# Patient Record
Sex: FEMALE | Race: WHITE | NOT HISPANIC OR LATINO | Employment: OTHER | URBAN - METROPOLITAN AREA
[De-identification: names, ages, dates, MRNs, and addresses within clinical notes are randomized per-mention and may not be internally consistent; named-entity substitution may affect disease eponyms.]

---

## 2017-03-21 ENCOUNTER — MYC MEDICAL ADVICE (OUTPATIENT)
Dept: FAMILY MEDICINE | Facility: CLINIC | Age: 82
End: 2017-03-21

## 2017-03-21 DIAGNOSIS — M70.61 TROCHANTERIC BURSITIS OF RIGHT HIP: ICD-10-CM

## 2017-03-21 DIAGNOSIS — M54.5 LOW BACK PAIN, UNSPECIFIED BACK PAIN LATERALITY, UNSPECIFIED CHRONICITY, WITH SCIATICA PRESENCE UNSPECIFIED: Primary | ICD-10-CM

## 2017-03-21 NOTE — TELEPHONE ENCOUNTER
Per 12/16/16 ov:  Bursitis of right hip  She will switch to pool exercises when she returns to [place] in January. If persists when she comes back, could consider injection for sports meds    physical therapy referral pending

## 2017-04-28 DIAGNOSIS — I10 HYPERTENSION GOAL BP (BLOOD PRESSURE) < 140/90: ICD-10-CM

## 2017-04-28 DIAGNOSIS — M17.12 PRIMARY OSTEOARTHRITIS OF LEFT KNEE: ICD-10-CM

## 2017-04-28 RX ORDER — AMLODIPINE BESYLATE 5 MG/1
TABLET ORAL
Qty: 90 TABLET | Refills: 0 | Status: SHIPPED | OUTPATIENT
Start: 2017-04-28 | End: 2017-06-02

## 2017-04-28 RX ORDER — LOSARTAN POTASSIUM 50 MG/1
TABLET ORAL
Qty: 90 TABLET | Refills: 0 | Status: SHIPPED | OUTPATIENT
Start: 2017-04-28 | End: 2017-06-02

## 2017-05-01 ENCOUNTER — TELEPHONE (OUTPATIENT)
Dept: FAMILY MEDICINE | Facility: CLINIC | Age: 82
End: 2017-05-01

## 2017-05-01 NOTE — TELEPHONE ENCOUNTER
Chart reflects meds sent to her pharmacy 4/28/17 x 90 days.  Will be due for MD appointment in June.  Busy signal x2.  Martha Mccray RN

## 2017-05-01 NOTE — TELEPHONE ENCOUNTER
Patient is calling to request refills on Amlodipine and Losartan medications. Patient says she comes to see  every 6 months for a recheck and labs, but she saw her last December. Please call patient to advise. Thank you

## 2017-05-01 NOTE — TELEPHONE ENCOUNTER
Prescriptions were filled 4/28/17 to [facility] mail order.   Left message on answering machine for patient/parent to call back.   [phone].  Ramona Villarreal RN

## 2017-05-02 NOTE — TELEPHONE ENCOUNTER
Patient is informed prescriptions were sent to [facility] 4/28/17.  Aware due for appointment in June.   Will call back if has concerns.  Verbalized good understanding.   Ramona Villarreal RN

## 2017-05-08 ENCOUNTER — THERAPY VISIT (OUTPATIENT)
Dept: PHYSICAL THERAPY | Facility: CLINIC | Age: 82
End: 2017-05-08
Payer: MEDICARE

## 2017-05-08 DIAGNOSIS — M25.551 HIP PAIN, RIGHT: ICD-10-CM

## 2017-05-08 DIAGNOSIS — M54.50 RIGHT-SIDED LOW BACK PAIN WITHOUT SCIATICA: Primary | ICD-10-CM

## 2017-05-08 PROCEDURE — 97110 THERAPEUTIC EXERCISES: CPT | Mod: GP | Performed by: PHYSICAL THERAPIST

## 2017-05-08 PROCEDURE — G8978 MOBILITY CURRENT STATUS: HCPCS | Mod: GP | Performed by: PHYSICAL THERAPIST

## 2017-05-08 PROCEDURE — G8979 MOBILITY GOAL STATUS: HCPCS | Mod: GP | Performed by: PHYSICAL THERAPIST

## 2017-05-08 PROCEDURE — 97161 PT EVAL LOW COMPLEX 20 MIN: CPT | Mod: GP | Performed by: PHYSICAL THERAPIST

## 2017-05-08 ASSESSMENT — ACTIVITIES OF DAILY LIVING (ADL)
WALKING_APPROXIMATELY_10_MINUTES: NO DIFFICULTY AT ALL
GETTING_INTO_AND_OUT_OF_AN_AVERAGE_CAR: NO DIFFICULTY AT ALL
HOS_ADL_ITEM_SCORE_TOTAL: 65
HOS_ADL_COUNT: 17
WALKING_DOWN_STEEP_HILLS: NO DIFFICULTY AT ALL
STANDING_FOR_15_MINUTES: NO DIFFICULTY AT ALL
WALKING_15_MINUTES_OR_GREATER: NO DIFFICULTY AT ALL
STEPPING_UP_AND_DOWN_CURBS: NO DIFFICULTY AT ALL
RECREATIONAL_ACTIVITIES: NO DIFFICULTY AT ALL
GOING_DOWN_1_FLIGHT_OF_STAIRS: NO DIFFICULTY AT ALL
DEEP_SQUATTING: EXTREME DIFFICULTY
WALKING_INITIALLY: NO DIFFICULTY AT ALL
GETTING_INTO_AND_OUT_OF_A_BATHTUB: NO DIFFICULTY AT ALL
SITTING_FOR_15_MINUTES: NO DIFFICULTY AT ALL
HEAVY_WORK: NO DIFFICULTY AT ALL
ROLLING_OVER_IN_BED: NO DIFFICULTY AT ALL
HOS_ADL_HIGHEST_POTENTIAL_SCORE: 68
GOING_UP_1_FLIGHT_OF_STAIRS: NO DIFFICULTY AT ALL
HOS_ADL_SCORE(%): 95.59
LIGHT_TO_MODERATE_WORK: NO DIFFICULTY AT ALL
TWISTING/PIVOTING_ON_INVOLVED_LEG: NO DIFFICULTY AT ALL
WALKING_UP_STEEP_HILLS: NO DIFFICULTY AT ALL
PUTTING_ON_SOCKS_AND_SHOES: NO DIFFICULTY AT ALL

## 2017-05-08 NOTE — PROGRESS NOTES
[facility] Initial Evaluation -- Lumbar    Date: May 8, 2017  Kate Isaac is a 81 year old female with a lumbar condition.   Referral: primary care  Work mechanical stresses:  retired  Employment status:  retired  Leisure mechanical stresses: walking for exercise  Functional disability score (CRISTÓBAL/STarT Back):  See CRISTÓBAL in flowsheet  VAS score (0-10): 2/10  Patient goals:  Decrease pain    HISTORY:    Present symptoms: R buttock/lateral hip pain, B low back pain  Pain quality (sharp/shooting/stabbing/aching/burning/cramping):  achey   Paresthesia (yes/no):  no    Present since (onset date): fall 2016 (saw MD then).  More recently in March 2017 after getting back from [place] for winter messaged MD and was recommended PT   Symptoms (improving/unchanging/worsening):  unchanging.   Symptoms commenced as a result of: nothing   Condition occurred in the following environment:   home     Symptoms at onset (back/thigh/leg): R buttock/lateral R hip  Constant symptoms (back/thigh/leg): R buttock/lateral R hip  Intermittent symptoms (back/thigh/leg): low back    Symptoms are made worse with the following: Always Walking, Always Lying on R side and Time of day - No effect   Symptoms are made better with the following: nothing    Disturbed sleep (yes/no):  Yes,  Interrupts sleep but able to get back to sleep Sleeping postures (prone/sup/side R/L): either side    Previous episodes (0/1-5/6-10/11+): none prior to last fall Year of first episode: n/a    Previous history: MD dx R hip bursitis  Previous treatments: none      Specific Questions:  Cough/Sneeze/Strain (pos/neg): no  Bowel/Bladder (normal/abnormal): no  Gait (normal/abnormal): normal  Medications (nil/NSAIDS/analg/steroids/anticoag/other):  Other - Hormone replacement and Malexacam for OA in knees  Medical allergies:  sulfa  General health (excellent/good/fair/poor):  good  Pertinent medical history:  Osteoarthritis, Cancer (breast 1996, basal cell  2004) and High blood pressure  Imaging (NA/Xray/MRI):  none  Recent or major surgery (yes/no):  no  Night pain (yes/no): no  Accidents (yes/no): no  Unexplained weight loss (yes/no): no  Barriers at home: no  Other red flags: none    EXAMINATION    Posture:   Sitting (good/fair/poor): fair  Standing (good/fair/poor):fair  Lordosis (red/acc/normal): red  Correction of posture (better/worse/no effect): NE    Lateral Shift (right/left/nil): nil  Relevant (yes/no):  n/a  Other Observations: none    Neurological:    Motor deficit:  B hip flexion and knee extension 4/5  Reflexes:  n/a  Sensory deficit:  none  Dural signs:  n/a    Movement Loss:   Santiago Mod Min Nil Pain   Flexion    x    Extension   x  Inc low back   Side Gliding R    x    Side Gliding L    x      Test Movements:   During: produces, abolishes, increases, decreases, no effect, centralizing, peripheralizing   After: better, worse, no better, no worse, no effect, centralized, peripheralized    Pretest symptoms standing: B LBP, R buttock pain   Symptoms During Symptoms After ROM increased ROM decreased No Effect   FIS No Effect No Effect      Rep FIS        EIS Inc low back No Effect      Rep EIS Centralising No Better   x   Pretest symptoms lying: R buttock pain    Symptoms During Symptoms After ROM increased ROM decreased No Effect   DIMA        Rep DIMA        EIL        Rep EIL        If required, pretest symptoms:    Symptoms During Symptoms After ROM increased ROM decreased No Effect   SGIS - R        Rep SGIS - R        SGIS - L        Rep SGIS - L          Static Tests:  Sitting slouched:  Inc Low back  Sitting erect:  NE  Standing slouched n/a  Standing erect:  n/a  Lying prone in extension:  Abolishes R buttcok Long sitting:  n/a    Other Tests: tender upon palpation B greater trochanters, + B STEVEN, NE B passive hip flexion    Provisional Classification:  Inconclusive/Other -     Principle of Management:  Education:  Use of lumbar support with  "roll   Equipment provided:  none  Mechanical therapy (Y/N):  Y   Extension principle:  EIS 10 reps every 2-3 hrs, prone lying in extension up to 2x/day  Lateral Principle:    Flexion principle:    Other:      ASSESSMENT/PLAN:    Patient is a 81 year old female with lumbar complaints.    Patient has the following significant findings with corresponding treatment plan.                Diagnosis 1:  B LBP with R hip pain (\"other\")  Pain -  manual therapy, self management, education, directional preference exercise and home program  Decreased ROM/flexibility - manual therapy, therapeutic exercise, therapeutic activity and home program  Decreased strength - therapeutic exercise, therapeutic activities and home program  Inflammation - self management/home program  Decreased function - therapeutic activities and home program  Impaired posture - neuro re-education, therapeutic activities and home program    Therapy Evaluation Codes:   1) History comprised of:   Personal factors that impact the plan of care:      None.    Comorbidity factors that impact the plan of care are:      High blood pressure and Osteoarthritis.     Medications impacting care: Pain.  2) Examination of Body Systems comprised of:   Body structures and functions that impact the plan of care:      Hip and Lumbar spine.   Activity limitations that impact the plan of care are:      Walking.  3) Clinical presentation characteristics are:   Stable/Uncomplicated.  4) Decision-Making    Low complexity using standardized patient assessment instrument and/or measureable assessment of functional outcome.  Cumulative Therapy Evaluation is: Low complexity.    Previous and current functional limitations:  (See Goal Flow Sheet for this information)    Short term and Long term goals: (See Goal Flow Sheet for this information)     Communication ability:  Patient appears to be able to clearly communicate and understand verbal and written communication and follow " directions correctly.  Treatment Explanation - The following has been discussed with the patient:   RX ordered/plan of care  Anticipated outcomes  Possible risks and side effects  This patient would benefit from PT intervention to resume normal activities.   Rehab potential is good.    Frequency:  2 X week, once daily  Duration:  for 3 weeks  Discharge Plan:  Achieve all LTG.  Independent in home treatment program.  Reach maximal therapeutic benefit.    Please refer to the daily flowsheet for treatment today, total treatment time and time spent performing 1:1 timed codes.

## 2017-05-08 NOTE — PROGRESS NOTES
HPI                    Objective:    System    Physical Exam                                         Musculoskeletal:        Arms:       Legs:      ROS

## 2017-05-08 NOTE — LETTER
DEPARTMENT OF HEALTH AND HUMAN SERVICES  CENTERS FOR MEDICARE & MEDICAID SERVICES    PLAN/UPDATED PLAN OF PROGRESS FOR OUTPATIENT REHABILITATION    PATIENTS NAME:  Kate Isaac     : 1935    PROVIDER NUMBER:    [id number]    [facility]    PROVIDER NAME: [facility]    MEDICAL RECORD NUMBER: [id number]     START OF CARE DATE:  SOC Date: 17   TYPE:  PT    PRIMARY/TREATMENT DIAGNOSIS: (Pertinent Medical Diagnosis)     Right-sided low back pain without sciatica  Hip pain, right    VISITS FROM START OF CARE:  Rxs Used: 1     [facility] Initial Evaluation -- Lumbar    Date: May 8, 2017  Kate Isaac is a 81 year old female with a lumbar condition.   Referral: primary care  Work mechanical stresses:  retired  Employment status:  retired  Leisure mechanical stresses: walking for exercise  Functional disability score (CRISTÓBAL/STarT Back):  See CRISTÓBAL in flowsheet  VAS score (0-10): 2/10  Patient goals:  Decrease pain    HISTORY:    Present symptoms: R buttock/lateral hip pain, B low back pain  Pain quality (sharp/shooting/stabbing/aching/burning/cramping):  achey   Paresthesia (yes/no):  no    Present since (onset date): 2016 (saw MD then).  More recently in 2017 after getting back from [place] for winter terry MARVIN and was recommended PT   Symptoms (improving/unchanging/worsening):  unchanging.   Symptoms commenced as a result of: nothing   Condition occurred in the following environment:   home     Symptoms at onset (back/thigh/leg): R buttock/lateral R hip  Constant symptoms (back/thigh/leg): R buttock/lateral R hip  Intermittent symptoms (back/thigh/leg): low back    Symptoms are made worse with the following: Always Walking, Always Lying on R side and Time of day - No effect   Symptoms are made better with the following: nothing    Disturbed sleep (yes/no):  Yes,  Interrupts sleep but able to get back to sleep Sleeping  postures (prone/sup/side R/L): either side    Previous episodes (0/1-5/6-10/11+): none prior to last fall Year of first episode: n/a    Previous history: MD kei AWAD hip bursitis  Previous treatments: none      Specific Questions:  Cough/Sneeze/Strain (pos/neg): no  Bowel/Bladder (normal/abnormal): no  Gait (normal/abnormal): normal  Medications (nil/NSAIDS/analg/steroids/anticoag/other):  Other - Hormone replacement and Malexacam for OA in knees  Medical allergies:  sulfa  General health (excellent/good/fair/poor):  good  Pertinent medical history:  Osteoarthritis, Cancer (breast 1996, basal cell 2004) and High blood pressure  Imaging (NA/Xray/MRI):  none  Recent or major surgery (yes/no):  no  Night pain (yes/no): no  Accidents (yes/no): no  Unexplained weight loss (yes/no): no  Barriers at home: no  Other red flags: none    EXAMINATION    Posture:   Sitting (good/fair/poor): fair  Standing (good/fair/poor):fair  Lordosis (red/acc/normal): red  Correction of posture (better/worse/no effect): NE    Lateral Shift (right/left/nil): nil  Relevant (yes/no):  n/a  Other Observations: none    Neurological:    Motor deficit:  B hip flexion and knee extension 4/5  Reflexes:  n/a  Sensory deficit:  none  Dural signs:  n/a    Movement Loss:   Santiago Mod Min Nil Pain   Flexion    x    Extension   x  Inc low back   Side Gliding R    x    Side Gliding L    x      Test Movements:   During: produces, abolishes, increases, decreases, no effect, centralizing, peripheralizing   After: better, worse, no better, no worse, no effect, centralized, peripheralized    Pretest symptoms standing: B LBP, R buttock pain   Symptoms During Symptoms After ROM increased ROM decreased No Effect   FIS No Effect No Effect      Rep FIS        EIS Inc low back No Effect      Rep EIS Centralising No Better   x   Pretest symptoms lying: R buttock pain    Symptoms During Symptoms After ROM increased ROM decreased No Effect   DIMA        Rep DIMA        EIL       "  Rep EIL        If required, pretest symptoms:    Symptoms During Symptoms After ROM increased ROM decreased No Effect   SGIS - R        Rep SGIS - R        SGIS - L        Rep SGIS - L          Static Tests:  Sitting slouched:  Inc Low back  Sitting erect:  NE  Standing slouched n/a  Standing erect:  n/a  Lying prone in extension:  Abolishes R buttcok Long sitting:  n/a    Other Tests: tender upon palpation B greater trochanters, + B STEVEN, NE B passive hip flexion    Provisional Classification:  Inconclusive/Other -     Principle of Management:  Education:  Use of lumbar support with roll   Equipment provided:  none  Mechanical therapy (Y/N):  Y   Extension principle:  EIS 10 reps every 2-3 hrs, prone lying in extension up to 2x/day  Lateral Principle:    Flexion principle:    Other:      ASSESSMENT/PLAN:    Patient is a 81 year old female with lumbar complaints.    Patient has the following significant findings with corresponding treatment plan.                Diagnosis 1:  B LBP with R hip pain (\"other\")  Pain -  manual therapy, self management, education, directional preference exercise and home program  Decreased ROM/flexibility - manual therapy, therapeutic exercise, therapeutic activity and home program  Decreased strength - therapeutic exercise, therapeutic activities and home program  Inflammation - self management/home program  Decreased function - therapeutic activities and home program  Impaired posture - neuro re-education, therapeutic activities and home program    Therapy Evaluation Codes:   1) History comprised of:   Personal factors that impact the plan of care:      None.    Comorbidity factors that impact the plan of care are:      High blood pressure and Osteoarthritis.     Medications impacting care: Pain.  2) Examination of Body Systems comprised of:   Body structures and functions that impact the plan of care:      Hip and Lumbar spine.   Activity limitations that impact the plan of care " "are:      Walking.  3) Clinical presentation characteristics are:   Stable/Uncomplicated.  4) Decision-Making    Low complexity using standardized patient assessment instrument and/or measureable assessment of functional outcome.  Cumulative Therapy Evaluation is: Low complexity.    Previous and current functional limitations:  (See Goal Flow Sheet for this information)    Short term and Long term goals: (See Goal Flow Sheet for this information)     Communication ability:  Patient appears to be able to clearly communicate and understand verbal and written communication and follow directions correctly.  Treatment Explanation - The following has been discussed with the patient:   RX ordered/plan of care  Anticipated outcomes  Possible risks and side effects  This patient would benefit from PT intervention to resume normal activities.   Rehab potential is good.    Frequency:  2 X week, once daily  Duration:  for 3 weeks  Discharge Plan:  Achieve all LTG.  Independent in home treatment program.  Reach maximal therapeutic benefit.    Please refer to the daily flowsheet for treatment today, total treatment time and time spent performing 1:1 timed codes.             HPI                    Objective:    System    Physical Exam                                         Musculoskeletal:        Arms:       Legs:      ROS                  Caregiver Signature/Credentials _____________________________ Date ________       Treating Provider: Arian Wynne DPT   I have reviewed and certified the need for these services and plan of treatment while under my care.        PHYSICIAN'S SIGNATURE:   _________________________________________  Date___________   Lani Waters    Certification period:  Beginning of Cert date period: 05/08/17 to  End of Cert period date: 08/05/17     Functional Level Progress Report: Please see attached \"Goal Flow sheet for Functional level.\"    ____X____ Continue Services or       ________ DC Services         "        Service dates: From  SOC Date: 05/08/17 date to present

## 2017-05-08 NOTE — MR AVS SNAPSHOT
After Visit Summary   5/8/2017    Kate Isaac    MRN: [id number]           Patient Information     Date Of Birth          1935        Visit Information        Provider Department      5/8/2017 10:10 AM Arian Wynne, PT [facility]        Today's Diagnoses     Right-sided low back pain without sciatica    -  1    Hip pain, right           Follow-ups after your visit        Your next 10 appointments already scheduled     May 12, 2017 10:20 AM CDT   KEVIN Spine with Arian Wynne PT   [facility] ([facility])    [address]  [facility] [id number] [phone]            May 16, 2017 12:30 PM CDT   KEVIN Spine with Arian Wynne PT   [facility] ([facility])    [address]  [facility] [id number] [phone]              Who to contact     If you have questions or need follow up information about today's clinic visit or your schedule please contact [facility] directly at [phone].  Normal or non-critical lab and imaging results will be communicated to you by MyChart, letter or phone within 4 business days after the clinic has received the results. If you do not hear from us within 7 days, please contact the clinic through [employer] or phone. If you have a critical or abnormal lab result, we will notify you by phone as soon as possible.  Submit refill requests through [employer] or call your pharmacy and they will forward the refill request to us. Please allow 3 business days for your refill to be completed.          Additional Information About Your Visit        [employer] Information     [employer] gives you secure access to your electronic health record. If you see a primary care provider, you can also send messages to your care team and  make appointments. If you have questions, please call your primary care clinic.  If you do not have a primary care provider, please call [phone] and they will assist you.        Care EveryWhere ID     This is your Care EveryWhere ID. This could be used by other organizations to access your [facility] medical records  [id number]         Blood Pressure from Last 3 Encounters:   12/20/16 119/60   12/16/16 158/72   11/01/16 118/66    Weight from Last 3 Encounters:   12/16/16 65.3 kg (144 lb)   10/10/16 64.9 kg (143 lb)   10/07/16 64.3 kg (141 lb 12.8 oz)              We Performed the Following     HC PT EVAL, LOW COMPLEXITY     KEVIN CERT REPORT     KEVIN INITIAL EVAL REPORT     THERAPEUTIC EXERCISES        Primary Care Provider Office Phone # Fax #    Lani Waters [phone]       [facility] [id number] [place] [id number]        Thank you!     Thank you for choosing [facility]  for your care. Our goal is always to provide you with excellent care. Hearing back from our patients is one way we can continue to improve our services. Please take a few minutes to complete the written survey that you may receive in the mail after your visit with us. Thank you!             Your Updated Medication List - Protect others around you: Learn how to safely use, store and throw away your medicines at www.disposemymeds.org.          This list is accurate as of: 5/8/17 11:25 AM.  Always use your most recent med list.                   Brand Name Dispense Instructions for use    amLODIPine 5 MG tablet    NORVASC    90 tablet    TAKE 1 TABLET EVERY DAY       aspirin 81 MG tablet      1 TABLET DAILY       calcium carb 1250 mg (500 mg [place])/vitamin D 200 units 500-200 MG-UNIT per tablet    OSCAL with D     Take 1 tablet by mouth 2 times daily (with meals)       FIBER PO      Take by mouth At Bedtime       FLAX PO          GLUCOSAMINE CHONDROITIN  Tabs      1 twice daily       losartan 50 MG tablet    COZAAR    90 tablet    TAKE 1 TABLET EVERY DAY       meloxicam 15 MG tablet    MOBIC    90 tablet    Take 1 tablet (15 mg) by mouth daily       * PRESERVISION AREDS 2 Caps          * CENTRUM SILVER ADULT 50+ Tabs      Take 1 capsule by mouth       * Notice:  This list has 2 medication(s) that are the same as other medications prescribed for you. Read the directions carefully, and ask your doctor or other care provider to review them with you.

## 2017-05-12 ENCOUNTER — THERAPY VISIT (OUTPATIENT)
Dept: PHYSICAL THERAPY | Facility: CLINIC | Age: 82
End: 2017-05-12
Payer: MEDICARE

## 2017-05-12 DIAGNOSIS — M54.50 RIGHT-SIDED LOW BACK PAIN WITHOUT SCIATICA, UNSPECIFIED CHRONICITY: ICD-10-CM

## 2017-05-12 DIAGNOSIS — M25.551 HIP PAIN, RIGHT: ICD-10-CM

## 2017-05-12 PROCEDURE — 97530 THERAPEUTIC ACTIVITIES: CPT | Mod: GP | Performed by: PHYSICAL THERAPIST

## 2017-05-12 PROCEDURE — 97110 THERAPEUTIC EXERCISES: CPT | Mod: GP | Performed by: PHYSICAL THERAPIST

## 2017-05-12 NOTE — MR AVS SNAPSHOT
After Visit Summary   5/12/2017    Kate Isaac    MRN: [id number]           Patient Information     Date Of Birth          1935        Visit Information        Provider Department      5/12/2017 10:20 AM Arian Wynne, PT [facility] Physical Therapy        Today's Diagnoses     Right-sided low back pain without sciatica, unspecified chronicity        Hip pain, right           Follow-ups after your visit        Your next 10 appointments already scheduled     May 19, 2017 11:40 AM CDT   [facility] Spine with Arian Wynne PT   [facility] Physical Therapy ([facility])    [address]  [facility] [address] [phone]              Who to contact     If you have questions or need follow up information about today's clinic visit or your schedule please contact [facility] PHYSICAL [facility] directly at [phone].  Normal or non-critical lab and imaging results will be communicated to you by Camperoohart, letter or phone within 4 business days after the clinic has received the results. If you do not hear from us within 7 days, please contact the clinic through [employer] or phone. If you have a critical or abnormal lab result, we will notify you by phone as soon as possible.  Submit refill requests through [employer] or call your pharmacy and they will forward the refill request to us. Please allow 3 business days for your refill to be completed.          Additional Information About Your Visit        Camperoohart Information     [employer] gives you secure access to your electronic health record. If you see a primary care provider, you can also send messages to your care team and make appointments. If you have questions, please call your primary care clinic.  If you do not have a primary care provider, please call [phone] and they will assist you.        Care EveryWhere ID     This  is your Care EveryWhere ID. This could be used by other organizations to access your [facility] medical records  [id number]         Blood Pressure from Last 3 Encounters:   12/20/16 119/60   12/16/16 158/72   11/01/16 118/66    Weight from Last 3 Encounters:   12/16/16 65.3 kg (144 lb)   10/10/16 64.9 kg (143 lb)   10/07/16 64.3 kg (141 lb 12.8 oz)              We Performed the Following     THERAPEUTIC ACTIVITIES     THERAPEUTIC EXERCISES        Primary Care Provider Office Phone # Fax #    Lani Waters [phone]       [facility] [address] [facility] [address]        Thank you!     Thank you for choosing [place] FOR ATHLETIC MEDICINE [facility] PHYSICAL THERAPY  for your care. Our goal is always to provide you with excellent care. Hearing back from our patients is one way we can continue to improve our services. Please take a few minutes to complete the written survey that you may receive in the mail after your visit with us. Thank you!             Your Updated Medication List - Protect others around you: Learn how to safely use, store and throw away your medicines at www.disposemymeds.org.          This list is accurate as of: 5/12/17 11:02 AM.  Always use your most recent med list.                   Brand Name Dispense Instructions for use    amLODIPine 5 MG tablet    NORVASC    90 tablet    TAKE 1 TABLET EVERY DAY       aspirin 81 MG tablet      1 TABLET DAILY       calcium carb 1250 mg (500 mg [place])/vitamin D 200 units 500-200 MG-UNIT per tablet    OSCAL with D     Take 1 tablet by mouth 2 times daily (with meals)       FIBER PO      Take by mouth At Bedtime       FLAX PO          GLUCOSAMINE CHONDROITIN Tabs      1 twice daily       losartan 50 MG tablet    COZAAR    90 tablet    TAKE 1 TABLET EVERY DAY       meloxicam 15 MG tablet    MOBIC    90 tablet    Take 1 tablet (15 mg) by mouth daily       * PRESERVISION AREDS 2 Caps          * CENTRUM SILVER ADULT 50+  Tabs      Take 1 capsule by mouth       * Notice:  This list has 2 medication(s) that are the same as other medications prescribed for you. Read the directions carefully, and ask your doctor or other care provider to review them with you.

## 2017-05-23 ENCOUNTER — DOCUMENTATION ONLY (OUTPATIENT)
Dept: LAB | Facility: CLINIC | Age: 82
End: 2017-05-23

## 2017-05-23 DIAGNOSIS — I10 ESSENTIAL HYPERTENSION WITH GOAL BLOOD PRESSURE LESS THAN 140/90: Primary | ICD-10-CM

## 2017-05-23 NOTE — PROGRESS NOTES
Please review and order laboratory future orders for patient  upcoming lab appointment on 05/30/17.  Thank you,   Astrid Fountain MLT

## 2017-05-30 ENCOUNTER — MYC MEDICAL ADVICE (OUTPATIENT)
Dept: FAMILY MEDICINE | Facility: CLINIC | Age: 82
End: 2017-05-30

## 2017-05-30 DIAGNOSIS — I10 ESSENTIAL HYPERTENSION WITH GOAL BLOOD PRESSURE LESS THAN 140/90: ICD-10-CM

## 2017-05-30 LAB
ANION GAP SERPL CALCULATED.3IONS-SCNC: 7 MMOL/L (ref 3–14)
BUN SERPL-MCNC: 11 MG/DL (ref 7–30)
CALCIUM SERPL-MCNC: 9.1 MG/DL (ref 8.5–10.1)
CHLORIDE SERPL-SCNC: 103 MMOL/L (ref 94–109)
CHOLEST SERPL-MCNC: 202 MG/DL
CO2 SERPL-SCNC: 29 MMOL/L (ref 20–32)
CREAT SERPL-MCNC: 0.57 MG/DL (ref 0.52–1.04)
GFR SERPL CREATININE-BSD FRML MDRD: NORMAL ML/MIN/1.7M2
GLUCOSE SERPL-MCNC: 97 MG/DL (ref 70–99)
HDLC SERPL-MCNC: 91 MG/DL
LDLC SERPL CALC-MCNC: 98 MG/DL
NONHDLC SERPL-MCNC: 111 MG/DL
POTASSIUM SERPL-SCNC: 4.3 MMOL/L (ref 3.4–5.3)
SODIUM SERPL-SCNC: 139 MMOL/L (ref 133–144)
TRIGL SERPL-MCNC: 63 MG/DL

## 2017-05-30 PROCEDURE — 80048 BASIC METABOLIC PNL TOTAL CA: CPT | Performed by: FAMILY MEDICINE

## 2017-05-30 PROCEDURE — 80061 LIPID PANEL: CPT | Performed by: FAMILY MEDICINE

## 2017-05-30 PROCEDURE — 36415 COLL VENOUS BLD VENIPUNCTURE: CPT | Performed by: FAMILY MEDICINE

## 2017-05-31 RX ORDER — ACETAMINOPHEN 500 MG
1000 TABLET ORAL 2 TIMES DAILY PRN
COMMUNITY
Start: 2017-05-31 | End: 2017-10-11

## 2017-05-31 NOTE — TELEPHONE ENCOUNTER
Discussed verbal orders with Dr. Lani Waters, to provider to cosign.  Removed meloxicam from med list, added tylenol TERRENCEN  Steph Waters

## 2017-06-05 ENCOUNTER — OFFICE VISIT (OUTPATIENT)
Dept: FAMILY MEDICINE | Facility: CLINIC | Age: 82
End: 2017-06-05
Payer: COMMERCIAL

## 2017-06-05 VITALS
SYSTOLIC BLOOD PRESSURE: 136 MMHG | TEMPERATURE: 97.7 F | DIASTOLIC BLOOD PRESSURE: 70 MMHG | WEIGHT: 143 LBS | OXYGEN SATURATION: 99 % | BODY MASS INDEX: 24.93 KG/M2 | HEART RATE: 86 BPM

## 2017-06-05 DIAGNOSIS — I10 HYPERTENSION GOAL BP (BLOOD PRESSURE) < 140/90: Primary | ICD-10-CM

## 2017-06-05 DIAGNOSIS — M17.12 PRIMARY OSTEOARTHRITIS OF LEFT KNEE: ICD-10-CM

## 2017-06-05 DIAGNOSIS — M25.511 CHRONIC RIGHT SHOULDER PAIN: ICD-10-CM

## 2017-06-05 DIAGNOSIS — H90.6 MIXED HEARING LOSS, BILATERAL: ICD-10-CM

## 2017-06-05 DIAGNOSIS — G89.29 CHRONIC RIGHT SHOULDER PAIN: ICD-10-CM

## 2017-06-05 DIAGNOSIS — C50.919 MALIGNANT NEOPLASM OF FEMALE BREAST, UNSPECIFIED LATERALITY, UNSPECIFIED SITE OF BREAST: ICD-10-CM

## 2017-06-05 PROCEDURE — 99214 OFFICE O/P EST MOD 30 MIN: CPT | Performed by: FAMILY MEDICINE

## 2017-06-05 RX ORDER — LOSARTAN POTASSIUM 50 MG/1
TABLET ORAL
Qty: 90 TABLET | Refills: 1 | Status: SHIPPED | OUTPATIENT
Start: 2017-06-05 | End: 2017-10-11

## 2017-06-05 RX ORDER — AMLODIPINE BESYLATE 5 MG/1
TABLET ORAL
Qty: 90 TABLET | Refills: 3 | Status: SHIPPED | OUTPATIENT
Start: 2017-06-05 | End: 2018-04-30

## 2017-06-05 NOTE — NURSING NOTE
"Chief Complaint   Patient presents with     Swelling     right arm        Initial /73  Pulse 86  Temp 97.7  F (36.5  C) (Oral)  Wt 143 lb (64.9 kg)  SpO2 99%  BMI 24.93 kg/m2 Estimated body mass index is 24.93 kg/(m^2) as calculated from the following:    Height as of 12/16/16: 5' 3.5\" (1.613 m).    Weight as of this encounter: 143 lb (64.9 kg).  Medication Reconciliation: complete  "

## 2017-06-05 NOTE — PROGRESS NOTES
SUBJECTIVE:                                                    Kate Isaac is a 81 year old female who presents to clinic today for the following health issues:      Swelling in right arm started April 1-2   Always there since 04/01/17  No pain ,just worry some due to no lymph nodes removed when she had breast cancer surgery  Will refer to PT for lymphedema    Soreness in right shoulder   Right shoulder pain for 6 months. Did not improve after stopping golfing  No trauma or falls  Pain is an ache of the upper arm. Worse with use, better with rest  Worse with abduction     Pt with HTN. On meds, well controlled, needs refills.    Pt with hearing screen from outside source. Note some sensorineural loss but also conduction loss. Will refer to ENT for further assessment    Pt with some pain in left knee. Discussed previous xray showed arthritis. Tylenol ES as needed    Pt w          Problem list and histories reviewed & adjusted, as indicated.  Additional history: as documented    Labs reviewed in EPIC    Reviewed and updated as needed this visit by clinical staff  Tobacco  Allergies  Med Hx  Surg Hx  Fam Hx  Soc Hx      Reviewed and updated as needed this visit by Provider         ROS:  Constitutional, HEENT, cardiovascular, pulmonary, gi and gu systems are negative, except as otherwise noted.    OBJECTIVE:                                                    /70  Pulse 86  Temp 97.7  F (36.5  C) (Oral)  Wt 143 lb (64.9 kg)  SpO2 99%  BMI 24.93 kg/m2  Body mass index is 24.93 kg/(m^2).  GENERAL: healthy, alert and no distress  NECK: no adenopathy, no asymmetry, masses, or scars and thyroid normal to palpation  RESP: lungs clear to auscultation - no rales, rhonchi or wheezes  CV: regular rate and rhythm, normal S1 S2, no S3 or S4, no murmur, click or rub, no peripheral edema and peripheral pulses strong  ABDOMEN: soft, nontender, no hepatosplenomegaly, no masses and bowel sounds normal  MS: no  gross musculoskeletal defects noted, edema noted in right extremity. Pain to palpation over upper right arm. Limited ROM in right shoulder especially with abduction    Diagnostic Test Results:  none      ASSESSMENT/PLAN:                                                            1. Hypertension goal BP (blood pressure) < 140/90  At goal on meds, fu in 6 months  - losartan (COZAAR) 50 MG tablet; TAKE 1 TABLET EVERY DAY  Dispense: 90 tablet; Refill: 1  - amLODIPine (NORVASC) 5 MG tablet; TAKE 1 TABLET EVERY DAY  Dispense: 90 tablet; Refill: 3    2. Primary osteoarthritis of left knee      3. Mixed hearing loss, bilateral  As above,  - OTOLARYNGOLOGY REFERRAL    4. Chronic right shoulder pain  To PT  - [facility] PT, HAND, AND CHIROPRACTIC REFERRAL    5. Malignant neoplasm of female breast, unspecified laterality, unspecified site of breast (H)  Cause for right arm edema, will refer to PT for management of edema          Lani Licea MD  [facility]

## 2017-06-05 NOTE — MR AVS SNAPSHOT
After Visit Summary   6/5/2017    Kate Isaac    MRN: [id number]           Patient Information     Date Of Birth          1935        Visit Information        Provider Department      6/5/2017 11:40 AM Lani Waters MD [facility]        Today's Diagnoses     Hypertension goal BP (blood pressure) < 140/90    -  1    Primary osteoarthritis of left knee        Mixed hearing loss, bilateral        Chronic right shoulder pain        Malignant neoplasm of female breast, unspecified laterality, unspecified site of breast (H)           Follow-ups after your visit        Additional Services     [facility] PT, HAND, AND CHIROPRACTIC REFERRAL       **This order will print in the [facility] Scheduling Office**    Physical Therapy, Hand Therapy and Chiropractic Care are available through:    *[facility]  *[facility]  *[facility]    Call one number to schedule at any of the above locations: [phone].    Your provider has referred you to: Physical Therapy at [facility] or [facility]    Indication/Reason for Referral: Shoulder Pain  Onset of Illness: 6 months  Therapy Orders: Evaluate and Treat  Special Programs: None  Special Request: None    Celia Yang      Additional Comments for the Therapist or Chiropractor:     Please be aware that coverage of these services is subject to the terms and limitations of your health insurance plan.  Call member services at your health plan with any benefit or coverage questions.      Please bring the following to your appointment:    *Your personal calendar for scheduling future appointments  *Comfortable clothing            OTOLARYNGOLOGY REFERRAL       Your provider has referred you to: [facility]: [facility] [phone]   [email or web address]    Please be aware that coverage of these services is subject to the terms and limitations of your health insurance plan.  Call member  services at your health plan with any benefit or coverage questions.      Please bring the following with you to your appointment:    (1) Any X-Rays, CTs or MRIs which have been performed.  Contact the facility where they were done to arrange for  prior to your scheduled appointment.   (2) List of current medications  (3) This referral request   (4) Any documents/labs given to you for this referral                  Who to contact     If you have questions or need follow up information about today's clinic visit or your schedule please contact [facility] directly at [phone].  Normal or non-critical lab and imaging results will be communicated to you by [employer], letter or phone within 4 business days after the clinic has received the results. If you do not hear from us within 7 days, please contact the clinic through [employer] or phone. If you have a critical or abnormal lab result, we will notify you by phone as soon as possible.  Submit refill requests through [employer] or call your pharmacy and they will forward the refill request to us. Please allow 3 business days for your refill to be completed.          Additional Information About Your Visit        MyChart Information     [employer] gives you secure access to your electronic health record. If you see a primary care provider, you can also send messages to your care team and make appointments. If you have questions, please call your primary care clinic.  If you do not have a primary care provider, please call [phone] and they will assist you.        Care EveryWhere ID     This is your Care EveryWhere ID. This could be used by other organizations to access your [facility] medical records  [id number]        Your Vitals Were     Pulse Temperature Pulse Oximetry BMI (Body Mass Index)          86 97.7  F (36.5  C) (Oral) 99% 24.93 kg/m2         Blood Pressure from Last 3 Encounters:   06/05/17 136/70   12/20/16 119/60   12/16/16 158/72     Weight from Last 3 Encounters:   06/05/17 143 lb (64.9 kg)   12/16/16 144 lb (65.3 kg)   10/10/16 143 lb (64.9 kg)              We Performed the Following     [facility] PT, HAND, AND CHIROPRACTIC REFERRAL     OTOLARYNGOLOGY REFERRAL          Today's Medication Changes          These changes are accurate as of: 6/5/17 11:59 PM.  If you have any questions, ask your nurse or doctor.               These medicines have changed or have updated prescriptions.        Dose/Directions    amLODIPine 5 MG tablet   Commonly known as:  NORVASC   This may have changed:  See the new instructions.   Used for:  Hypertension goal BP (blood pressure) < 140/90   Changed by:  Lani Waters MD        TAKE 1 TABLET EVERY DAY   Quantity:  90 tablet   Refills:  3       losartan 50 MG tablet   Commonly known as:  COZAAR   This may have changed:  See the new instructions.   Used for:  Hypertension goal BP (blood pressure) < 140/90   Changed by:  Lani Waters MD        TAKE 1 TABLET EVERY DAY   Quantity:  90 tablet   Refills:  1            Where to get your medicines      Some of these will need a paper prescription and others can be bought over the counter.  Ask your nurse if you have questions.     Bring a paper prescription for each of these medications     amLODIPine 5 MG tablet    losartan 50 MG tablet                Primary Care Provider Office Phone # Fax #    Lani Waters [phone]       [facility] [address] [facility] [address]        Thank you!     Thank you for choosing [facility]  for your care. Our goal is always to provide you with excellent care. Hearing back from our patients is one way we can continue to improve our services. Please take a few minutes to complete the written survey that you may receive in the mail after your visit with us. Thank you!             Your Updated Medication List - Protect others around you: Learn how to safely use, store and throw away your  medicines at www.disposemymeds.org.          This list is accurate as of: 6/5/17 11:59 PM.  Always use your most recent med list.                   Brand Name Dispense Instructions for use    amLODIPine 5 MG tablet    NORVASC    90 tablet    TAKE 1 TABLET EVERY DAY       aspirin 81 MG tablet      1 TABLET DAILY       calcium carb 1250 mg (500 mg [place])/vitamin D 200 units 500-200 MG-UNIT per tablet    OSCAL with D     Take 1 tablet by mouth 2 times daily (with meals)       FIBER PO      Take by mouth At Bedtime       FLAX PO          GLUCOSAMINE CHONDROITIN Tabs      1 twice daily       losartan 50 MG tablet    COZAAR    90 tablet    TAKE 1 TABLET EVERY DAY       * PRESERVISION AREDS 2 Caps          * CENTRUM SILVER ADULT 50+ Tabs      Take 1 capsule by mouth       TYLENOL 500 MG tablet   Generic drug:  acetaminophen      Take 2 tablets (1,000 mg) by mouth 2 times daily as needed for mild pain       * Notice:  This list has 2 medication(s) that are the same as other medications prescribed for you. Read the directions carefully, and ask your doctor or other care provider to review them with you.

## 2017-06-06 ENCOUNTER — MYC MEDICAL ADVICE (OUTPATIENT)
Dept: FAMILY MEDICINE | Facility: CLINIC | Age: 82
End: 2017-06-06

## 2017-06-06 DIAGNOSIS — I89.0 LYMPHEDEMA OF EXTREMITY: Primary | ICD-10-CM

## 2017-06-06 NOTE — TELEPHONE ENCOUNTER
Message ment to be sent to Dr. Licea.  Please review the Work[employer] message below and advise patient.    Naomi NANCE RN, BSN

## 2017-06-06 NOTE — TELEPHONE ENCOUNTER
Can you please find out where there is a physical therapist that can help pt with her right arm lymphedema from her breast cancer.    Lani Licea

## 2017-06-07 ENCOUNTER — MEDICAL CORRESPONDENCE (OUTPATIENT)
Dept: HEALTH INFORMATION MANAGEMENT | Facility: CLINIC | Age: 82
End: 2017-06-07

## 2017-06-07 ENCOUNTER — MYC MEDICAL ADVICE (OUTPATIENT)
Dept: FAMILY MEDICINE | Facility: CLINIC | Age: 82
End: 2017-06-07

## 2017-06-07 NOTE — TELEPHONE ENCOUNTER
There is a telephone message open in patient chart for   To look in to a lymphedema clinic. Awaiting   Response.  Steph Smith, ANNN RN

## 2017-06-08 NOTE — TELEPHONE ENCOUNTER
[facility] has a Lymphedema Therapy clinic that can see the patient.  Patient first needs a therapy referral placed.  The representative that I spoke with stated we should use the following referral:  Lymphedema Therapy (not clinic) Referral that states P.T. or O.T.  Then the patient can call [phone] to schedule an evaluation and PT appointments.  Sonia Stephens,

## 2017-06-09 NOTE — TELEPHONE ENCOUNTER
Patient is not home, so I gave the scheduling number to the patients  and he will relay the information to the patient.  He believes that the Therapy center may have left the patient a phone message as well.  Sonia Stephens,

## 2017-06-14 ENCOUNTER — HOSPITAL ENCOUNTER (OUTPATIENT)
Dept: OCCUPATIONAL THERAPY | Facility: CLINIC | Age: 82
Setting detail: THERAPIES SERIES
End: 2017-06-14
Attending: FAMILY MEDICINE
Payer: MEDICARE

## 2017-06-14 PROCEDURE — 97140 MANUAL THERAPY 1/> REGIONS: CPT | Mod: GO | Performed by: OCCUPATIONAL THERAPIST

## 2017-06-14 PROCEDURE — G8987 SELF CARE CURRENT STATUS: HCPCS | Mod: GO,CJ | Performed by: OCCUPATIONAL THERAPIST

## 2017-06-14 PROCEDURE — G8988 SELF CARE GOAL STATUS: HCPCS | Mod: GO,CI | Performed by: OCCUPATIONAL THERAPIST

## 2017-06-14 PROCEDURE — 97165 OT EVAL LOW COMPLEX 30 MIN: CPT | Mod: GO | Performed by: OCCUPATIONAL THERAPIST

## 2017-06-14 PROCEDURE — 97535 SELF CARE MNGMENT TRAINING: CPT | Mod: GO | Performed by: OCCUPATIONAL THERAPIST

## 2017-06-14 PROCEDURE — 40000445 ZZHC STATISTIC OT VISIT, LYMPHEDEMA: Performed by: OCCUPATIONAL THERAPIST

## 2017-06-14 NOTE — PROGRESS NOTES
06/14/17 0918   Quick Adds   Quick Adds Certification   Type of Visit   Type of visit Initial Edema Evaluation   General Information   Start of care 06/14/17   Referring physician Jayne Mendieta CNP   Orders Evaluate and treat as indicated   Order date 06/06/17   Medical diagnosis H/o right breast cancer; lumpectomy, LND and radiation in 1996.  Presents with RUE lymphedema   Edema onset 04/01/17   Affected body parts RUE   Edema etiology Cancer with lymph node dissection;Radiation;Surgery   Location - Cancer with lymph node dissection right axillary lymphnode dissection   Location - Radiation RUQ   Surgical / medical history reviewed Yes   Prior level of functional mobility IND   Prior treatment (none)   Community support Family / friend caregiver   Patient role / employment history Retired   Living environment [place] [facility]   Fall Screening   Fall screen completed by OT   Per patient, fall 2 or more times in past year? No   Is patient a fall risk? No   System Outcome Measures   Outcome Measures Lymphedema   Lymphedema Life Impact Scale (score range 0-72). A higher score indicates greater impairment. 6   Subjective Report   Patient report of symptoms swelling in right lower arm   Patient / Family Goals   Patient / family goals statement to reduce swelling in RUE and prevent progression of lymphedema   Pain   Patient currently in pain Yes;Yes, see vital signs flowsheet   Pain location right shoulder, posterior   Pain description Ache   Cognitive Status   Orientation Orientation to person, place and time   Level of consciousness Alert   Follows commands and answers questions 100% of the time   Edema Exam / Assessment   Skin condition comments NO edema of LUE.  RUE with skin intact, no edema of digits and hand.  Moderate, dense, soft edema of medial arm.  Right, upper arm with soft, minimal edema of medial border.     Scar No   Stemmer sign Positive   Stemmer sign comments medial border of right arm from wrist to  axilla   Ulceration No   Girth Measurements   Girth Measurements Refer to separate girth measurement flowsheet   Range of Motion   ROM comments BUE AROM WNL for all joints   Posture   Posture Normal   Activities of Daily Living   Activities of Daily Living IND   Bed Mobility   Bed mobility IND   Transfers   Transfers IND   Gait / Locomotion   Gait / Locomotion IND   Sensory   Sensory perception comments no deficits, intact of right digits   Planned Edema Interventions   Planned edema interventions Manual lymph drainage;Gradient compression bandaging;Fit for compression garment;Exercises;Precautions to prevent infection / exacerbation;Education;Manual therapy;ADL training;Skin care / precautions;Home management program development   Clinical Impression   Criteria for skilled therapeutic intervention met Yes   Therapy diagnosis RUE lymphedema   Clinical Decision Making (Complexity) Low complexity   Treatment frequency (2x/wk x3wks, 1x/wk x1wk=7 more tx sessions)   Treatment duration within 6 weeks;7/26/17    Patient / family and/or staff in agreement with plan of care Yes   Risks and benefits of therapy have been explained Yes   Clinical impression comments Pt presents with lymphedema of RUE.  Pt will benefit from continued skilled OT intervention to preserve skin integrity, prevent infection, prevent progression of lymphedema and to reduce edema to be fit for a compression garment.     Goals   Edema Eval Goals 1;2;3   Goal 1   Goal identifier home program   Goal description Pt will demonstrate IND with home program including: GCB to RUE , self MLD and HEP to reduce edema to improve skin integrity, prevent infection and to be fit for compression sleeve/hand piece for edema management at discharge.   Target date 07/26/17   Goal 2   Goal identifier volume   Goal description Pt will demonstrate a 200mL reduction in RUE volume to prevent progression of lymphedema and to be fit for compression garments for edema management  at [facility].    Target date 07/26/17   Goal 3   Goal identifier discharge   Goal description Pt will be IND with donning compression sleeve and hand piece and verbalizing wear/wash/replace schedule for edema management at discharge.    Target date 07/26/17   Total Evaluation Time   Total evaluation time 20   Certification   Certification date from 06/14/17   Certification date to 07/26/17   Medical Diagnosis RUE lymphedema

## 2017-06-14 NOTE — PROGRESS NOTES
[facility]        OUTPATIENT OCCUPATIONAL THERAPY EDEMA EVALUATION  PLAN OF TREATMENT FOR OUTPATIENT REHABILITATION  (COMPLETE FOR INITIAL CLAIMS ONLY)  Patient's Last Name, First Name, Kate Randhawa                           Provider s Name:   [facility] Medical Record No.  [id number]     Start of Care Date:  06/14/17   Onset Date:  04/01/17   Type:      Medical Diagnosis:  RUE lymphedema   Therapy Diagnosis:  RUE lymphedema Visits from SOC:  1                                     __________________________________________________________________________________   Plan of Treatment/Functional Goals:    Manual lymph drainage, Gradient compression bandaging, Fit for compression garment, Exercises, Precautions to prevent infection / exacerbation, Education, Manual therapy, ADL training, Skin care / precautions, Home management program development        GOALS  1. Goal description: Pt will demonstrate IND with home program including: GCB to RUE , self MLD and HEP to reduce edema to improve skin integrity, prevent infection and to be fit for compression sleeve/hand piece for edema management at discharge.       Target date: 07/26/17  2. Goal description: Pt will demonstrate a 200mL reduction in RUE volume to prevent progression of lymphedema and to be fit for compression garments for edema management at [facility].        Target date: 07/26/17  3. Goal description: Pt will be IND with donning compression sleeve and hand piece and verbalizing wear/wash/replace schedule for edema management at discharge.        Target date: 07/26/17  4.            5.            6.               7.             8.              Treatment frequency:  (2x/wk x3wks, 1x/wk x1wk=7 more tx sessions)   Treatment duration: within 6 weeks;7/26/17     Kenna Anaya OT                                     I CERTIFY THE NEED FOR THESE SERVICES FURNISHED UNDER        THIS PLAN OF TREATMENT AND WHILE UNDER MY CARE     (Physician co-signature of this document indicates review and certification of the therapy plan).                   Certification date from: 06/14/17       Certification date to: 07/26/17           Referring physician: Jayne Mendieta CNP   Initial Assessment  See Epic Evaluation- Start of care: 06/14/17               Lani Licea

## 2017-06-19 ENCOUNTER — HOSPITAL ENCOUNTER (OUTPATIENT)
Dept: OCCUPATIONAL THERAPY | Facility: CLINIC | Age: 82
Setting detail: THERAPIES SERIES
End: 2017-06-19
Attending: FAMILY MEDICINE
Payer: MEDICARE

## 2017-06-19 PROCEDURE — 97140 MANUAL THERAPY 1/> REGIONS: CPT | Mod: GO | Performed by: OCCUPATIONAL THERAPIST

## 2017-06-19 PROCEDURE — 40000445 ZZHC STATISTIC OT VISIT, LYMPHEDEMA: Performed by: OCCUPATIONAL THERAPIST

## 2017-06-21 ENCOUNTER — HOSPITAL ENCOUNTER (OUTPATIENT)
Dept: OCCUPATIONAL THERAPY | Facility: CLINIC | Age: 82
Setting detail: THERAPIES SERIES
End: 2017-06-21
Attending: FAMILY MEDICINE
Payer: MEDICARE

## 2017-06-21 PROCEDURE — 40000445 ZZHC STATISTIC OT VISIT, LYMPHEDEMA: Performed by: OCCUPATIONAL THERAPIST

## 2017-06-21 PROCEDURE — 97535 SELF CARE MNGMENT TRAINING: CPT | Mod: GO | Performed by: OCCUPATIONAL THERAPIST

## 2017-06-21 PROCEDURE — 97140 MANUAL THERAPY 1/> REGIONS: CPT | Mod: GO | Performed by: OCCUPATIONAL THERAPIST

## 2017-07-03 ENCOUNTER — MYC MEDICAL ADVICE (OUTPATIENT)
Dept: FAMILY MEDICINE | Facility: CLINIC | Age: 82
End: 2017-07-03

## 2017-07-03 DIAGNOSIS — H90.3 BILATERAL SENSORINEURAL HEARING LOSS: Primary | ICD-10-CM

## 2017-07-05 ENCOUNTER — HOSPITAL ENCOUNTER (OUTPATIENT)
Dept: OCCUPATIONAL THERAPY | Facility: CLINIC | Age: 82
Setting detail: THERAPIES SERIES
End: 2017-07-05
Attending: FAMILY MEDICINE
Payer: MEDICARE

## 2017-07-05 PROCEDURE — 97140 MANUAL THERAPY 1/> REGIONS: CPT | Mod: GO | Performed by: OCCUPATIONAL THERAPIST

## 2017-07-05 PROCEDURE — 40000445 ZZHC STATISTIC OT VISIT, LYMPHEDEMA: Performed by: OCCUPATIONAL THERAPIST

## 2017-07-05 NOTE — TELEPHONE ENCOUNTER
Per 6/5/17 ov:  Pt with hearing screen from outside source. Note some sensorineural loss but also conduction loss. Will refer to ENT for further assessment    There are two different referrals for ENT, both referrals are pending. To provider to advise.  Steph Smith, ANNN RN

## 2017-07-07 ENCOUNTER — MYC MEDICAL ADVICE (OUTPATIENT)
Dept: FAMILY MEDICINE | Facility: CLINIC | Age: 82
End: 2017-07-07

## 2017-07-07 ENCOUNTER — HOSPITAL ENCOUNTER (OUTPATIENT)
Dept: OCCUPATIONAL THERAPY | Facility: CLINIC | Age: 82
Setting detail: THERAPIES SERIES
End: 2017-07-07
Attending: FAMILY MEDICINE
Payer: MEDICARE

## 2017-07-07 PROCEDURE — 40000445 ZZHC STATISTIC OT VISIT, LYMPHEDEMA: Performed by: OCCUPATIONAL THERAPIST

## 2017-07-07 PROCEDURE — 97140 MANUAL THERAPY 1/> REGIONS: CPT | Mod: GO | Performed by: OCCUPATIONAL THERAPIST

## 2017-07-10 ENCOUNTER — MYC MEDICAL ADVICE (OUTPATIENT)
Dept: FAMILY MEDICINE | Facility: CLINIC | Age: 82
End: 2017-07-10

## 2017-07-10 DIAGNOSIS — H90.3 BILATERAL SENSORINEURAL HEARING LOSS: Primary | ICD-10-CM

## 2017-07-10 NOTE — TELEPHONE ENCOUNTER
can you print off and fax over the referral to ENT that was written today? Once you do, please let patient know via mychart so she can make an appointment.  Thank you, ANN PeacockN RN

## 2017-07-10 NOTE — TELEPHONE ENCOUNTER
Referral faxed to [facility] @ [phone].  Notified patient through [employer].  Sonia Stephens,

## 2017-07-12 ENCOUNTER — HOSPITAL ENCOUNTER (OUTPATIENT)
Dept: OCCUPATIONAL THERAPY | Facility: CLINIC | Age: 82
Setting detail: THERAPIES SERIES
End: 2017-07-12
Attending: FAMILY MEDICINE
Payer: MEDICARE

## 2017-07-12 PROCEDURE — 97140 MANUAL THERAPY 1/> REGIONS: CPT | Mod: GO | Performed by: OCCUPATIONAL THERAPIST

## 2017-07-12 PROCEDURE — 40000445 ZZHC STATISTIC OT VISIT, LYMPHEDEMA: Performed by: OCCUPATIONAL THERAPIST

## 2017-07-19 ENCOUNTER — TRANSFERRED RECORDS (OUTPATIENT)
Dept: HEALTH INFORMATION MANAGEMENT | Facility: CLINIC | Age: 82
End: 2017-07-19

## 2017-07-28 ENCOUNTER — OFFICE VISIT (OUTPATIENT)
Dept: FAMILY MEDICINE | Facility: CLINIC | Age: 82
End: 2017-07-28
Payer: COMMERCIAL

## 2017-07-28 VITALS
OXYGEN SATURATION: 99 % | DIASTOLIC BLOOD PRESSURE: 81 MMHG | HEART RATE: 90 BPM | TEMPERATURE: 97.2 F | SYSTOLIC BLOOD PRESSURE: 147 MMHG

## 2017-07-28 DIAGNOSIS — R35.0 URINARY FREQUENCY: ICD-10-CM

## 2017-07-28 DIAGNOSIS — N39.0 URINARY TRACT INFECTION WITH HEMATURIA, SITE UNSPECIFIED: Primary | ICD-10-CM

## 2017-07-28 DIAGNOSIS — R82.90 NONSPECIFIC FINDING ON EXAMINATION OF URINE: ICD-10-CM

## 2017-07-28 DIAGNOSIS — R31.9 URINARY TRACT INFECTION WITH HEMATURIA, SITE UNSPECIFIED: Primary | ICD-10-CM

## 2017-07-28 LAB
ALBUMIN UR-MCNC: NEGATIVE MG/DL
AMORPH CRY #/AREA URNS HPF: ABNORMAL /HPF
APPEARANCE UR: ABNORMAL
BACTERIA #/AREA URNS HPF: ABNORMAL /HPF
BILIRUB UR QL STRIP: NEGATIVE
COLOR UR AUTO: YELLOW
GLUCOSE UR STRIP-MCNC: NEGATIVE MG/DL
HGB UR QL STRIP: ABNORMAL
KETONES UR STRIP-MCNC: NEGATIVE MG/DL
LEUKOCYTE ESTERASE UR QL STRIP: ABNORMAL
NITRATE UR QL: NEGATIVE
PH UR STRIP: 7 PH (ref 5–7)
RBC #/AREA URNS AUTO: ABNORMAL /HPF (ref 0–2)
SP GR UR STRIP: <=1.005 (ref 1–1.03)
URN SPEC COLLECT METH UR: ABNORMAL
UROBILINOGEN UR STRIP-ACNC: 0.2 EU/DL (ref 0.2–1)
WBC #/AREA URNS AUTO: ABNORMAL /HPF (ref 0–2)

## 2017-07-28 PROCEDURE — 87088 URINE BACTERIA CULTURE: CPT | Performed by: PHYSICIAN ASSISTANT

## 2017-07-28 PROCEDURE — 81001 URINALYSIS AUTO W/SCOPE: CPT | Performed by: PHYSICIAN ASSISTANT

## 2017-07-28 PROCEDURE — 99213 OFFICE O/P EST LOW 20 MIN: CPT | Performed by: PHYSICIAN ASSISTANT

## 2017-07-28 PROCEDURE — 87086 URINE CULTURE/COLONY COUNT: CPT | Performed by: PHYSICIAN ASSISTANT

## 2017-07-28 PROCEDURE — 87186 SC STD MICRODIL/AGAR DIL: CPT | Performed by: PHYSICIAN ASSISTANT

## 2017-07-28 RX ORDER — NITROFURANTOIN 25; 75 MG/1; MG/1
100 CAPSULE ORAL 2 TIMES DAILY
Qty: 14 CAPSULE | Refills: 0 | Status: SHIPPED | OUTPATIENT
Start: 2017-07-28 | End: 2017-08-04

## 2017-07-28 NOTE — PATIENT INSTRUCTIONS
Urinary Tract Infections in Women    Urinary tract infections (UTIs) are most often caused by bacteria (germs). These bacteria enter the urinary tract. The bacteria may come from outside the body. Or they may travel from the skin outside the rectum or vagina into the urethra. Female anatomy makes it easy for bacteria from the bowel to enter a woman s urinary tract, which is the most common source of UTI. This means women develop UTIs more often than men. Pain in or around the urinary tract is a common UTI symptom. But the only way to know for sure if you have a UTI for the healthcare provider to test your urine. The two tests that may be done are the urinalysis and urine culture.  Types of UTIs    Cystitis: A bladder infection (cystitis) is the most common UTI in women. You may have urgent or frequent urination. You may also have pain, burning when you urinate, and bloody urine.    Urethritis: This is an inflamed urethra, which is the tube that carries urine from the bladder to outside the body. You may have lower stomach or back pain. You may also have urgent or frequent urination.    Pyelonephritis: This is a kidney infection. If not treated, it can be serious and damage your kidneys. In severe cases, you may be hospitalized. You may have a fever and lower back pain.  Medicines to treat a UTI  Most UTIs are treated with antibiotics. These kill the bacteria. The length of time you need to take them depends on the type of infection. It may be as short as 3 days. If you have repeated UTIs, a low-dose antibiotic may be needed for several months. Take antibiotics exactly as directed. Don t stop taking them until all of the medicine is gone. If you stop taking the antibiotic too soon, the infection may not go away, and you may develop a resistance to the antibiotic. This can make it much harder to treat.  Lifestyle changes to treat and prevent UTIs  The lifestyle changes below will help get rid of your UTI. They may  also help prevent future UTIs.    Drink plenty of fluids. This includes water, juice, or other caffeine-free drinks. Fluids help flush bacteria out of your body.    Empty your bladder. Always empty your bladder when you feel the urge to urinate. And always urinate before going to sleep. Urine that stays in your bladder can lead to infection. Try to urinate before and after sex as well.    Practice good personal hygiene. Wipe yourself from front to back after using the toilet. This helps keep bacteria from getting into the urethra.    Use condoms during sex. These help prevent UTIs caused by sexually transmitted bacteria. Also, avoid using spermicides during sex. These can increase the risk of UTIs. Choose other forms of birth control instead. For women who tend to get UTIs after sex, a low-dose of a preventive antibiotic may be used. Be sure to discuss this option with your healthcare provider.    Follow up with your healthcare provider as directed. He or she may test to make sure the infection has cleared. If needed, more treatment may be started.  Date Last Reviewed: 1/1/2017 2000-2017 [employer]. [address] [place] [address]. All rights reserved. This information is not intended as a substitute for professional medical care. Always follow your healthcare professional's instructions.

## 2017-07-28 NOTE — PROGRESS NOTES
SUBJECTIVE:                                                    Kate Isaac is a 81 year old female who presents to clinic today for the following health issues:      URINARY TRACT SYMPTOMS  Onset: 6 days ago    Description:   Painful urination (Dysuria): no   Blood in urine (Hematuria): no   Delay in urine (Hesitency): YES    Intensity: mild    Progression of Symptoms:  improving    Accompanying Signs & Symptoms:  Fever/chills: no   Flank pain no   Nausea and vomiting: no   Any vaginal symptoms: none  Abdominal/Pelvic Pain: no     History:   History of frequent UTI's: no   History of kidney stones: no   Sexually Active: no   Possibility of pregnancy: No    Precipitating factors:   none    Therapies Tried and outcome: Increase fluid intake      It takes her a while to start peeing, the flow is slow, and then she does not feel finished.  She has urgency  Urine is sometimes cloudy  Frequency up 3-6 times to use the bathroom in the middle of the night    She has back pain through the mid low back for the past 6 weeks    Problem list and histories reviewed & adjusted, as indicated.  Additional history: as documented    Patient Active Problem List   Diagnosis     Osteopenia     Recurrent UTI     Breast cancer (H)     Malignant basal cell neoplasm of skin     Advance Care Planning     Seasonal allergies     CARDIOVASCULAR SCREENING; LDL GOAL LESS THAN 130     Urge incontinence     Urgency of urination     Urinary frequency     Female stress incontinence     Essential hypertension with goal blood pressure less than 140/90     Right-sided low back pain without sciatica     Hip pain, right     Past Surgical History:   Procedure Laterality Date     BIOPSY  1996 2004,2010    basal cell cancers     BUNIONECTOMY RT/LT       [facility] AFF SURGICAL PATHOLOGY      ganglion cyst removal     COLONOSCOPY       GENITOURINARY SURGERY       HC EXCISION BREAST LESION, OPEN >=1  1996    right breast     SLING TRANSVAGINAL  12/9/2013     Procedure: SLING TRANSVAGINAL;  Cysto with TVT;  Surgeon: Denia Shultz MD;  Location: [facility] OR     SURGICAL HISTORY OF -   1959    right thigh tumor removal/benign       Social History   Substance Use Topics     Smoking status: Former Smoker     Packs/day: 1.00     Years: 10.00     Types: Cigarettes     Start date: 1/1/1956     Quit date: 1/1/1985     Smokeless tobacco: Never Used      Comment: I stopped smoking several times from 1959 to 1985     Alcohol use 1.5 - 2.0 oz/week      Comment: Red wine     Family History   Problem Relation Age of Onset     HEART DISEASE Mother      cad at age 70s     CANCER Mother      KIDNEY     DIABETES Mother      Coronary Artery Disease Mother      Hypertension Mother      HEART DISEASE Father      chf     Neurologic Disorder Father      PARKINSONS     Alzheimer Disease Father      HEART DISEASE Maternal Grandmother      chf     Coronary Artery Disease Maternal Grandmother      CANCER Maternal Grandfather      ?     HEART DISEASE Paternal Grandmother      Coronary Artery Disease Paternal Grandmother      HEART DISEASE Paternal Grandfather      Neurologic Disorder Paternal Grandfather      PARKINSONS     Blood Disease Brother      LEUKEMIA     Breast Cancer Other      DIABETES Other      Mother's sister's daughter     CANCER Daughter      CERVICAL     CANCER Other      THYROID     CEREBROVASCULAR DISEASE Other      Breast Cancer Daughter          Current Outpatient Prescriptions   Medication Sig Dispense Refill     nitroFURantoin, macrocrystal-monohydrate, (MACROBID) 100 MG capsule Take 1 capsule (100 mg) by mouth 2 times daily for 7 days 14 capsule 0     losartan (COZAAR) 50 MG tablet TAKE 1 TABLET EVERY DAY 90 tablet 1     amLODIPine (NORVASC) 5 MG tablet TAKE 1 TABLET EVERY DAY 90 tablet 3     acetaminophen (TYLENOL) 500 MG tablet Take 2 tablets (1,000 mg) by mouth 2 times daily as needed for mild pain       calcium carb 1250 mg, 500 mg [place],/vitamin D 200 units (OSCAL WITH D)  500-200 MG-UNIT per tablet Take 1 tablet by mouth 2 times daily (with meals)       FIBER PO Take by mouth At Bedtime       Multiple Vitamins-Minerals (PRESERVISION AREDS 2) CAPS        Multiple Vitamins-Minerals (CENTRUM SILVER ADULT 50+) TABS Take 1 capsule by mouth        Flaxseed, Linseed, (FLAX PO)        ASPIRIN 81 MG OR TABS 1 TABLET DAILY       GLUCOSAMINE CHONDROITIN OR TABS 1 twice daily       Allergies   Allergen Reactions     Ceftriaxone Swelling     Sulfa Drugs Rash     Lisinopril Fatigue       ROS:  As in HPI      OBJECTIVE:     /81  Pulse 90  Temp 97.2  F (36.2  C) (Oral)  SpO2 99%  There is no height or weight on file to calculate BMI.  GENERAL: healthy, alert and no distress  RESP: lungs clear to auscultation - no rales, rhonchi or wheezes  CV: regular rate and rhythm, normal S1 S2, no murmur  ABDOMEN: soft, nontender, no hepatosplenomegaly, no masses  MS: no gross musculoskeletal defects noted, no edema    Diagnostic Test Results:  Results for orders placed or performed in visit on 07/28/17 (from the past 24 hour(s))   *UA reflex to Microscopic and Culture ([place] and [facility] (except [place] and [place])   Result Value Ref Range    Color Urine Yellow     Appearance Urine Slightly Cloudy     Glucose Urine Negative NEG mg/dL    Bilirubin Urine Negative NEG    Ketones Urine Negative NEG mg/dL    Specific Gravity Urine <=1.005 1.003 - 1.035    Blood Urine Small (A) NEG    pH Urine 7.0 5.0 - 7.0 pH    Protein Albumin Urine Negative NEG mg/dL    Urobilinogen Urine 0.2 0.2 - 1.0 EU/dL    Nitrite Urine Negative NEG    Leukocyte Esterase Urine Large (A) NEG    Source Midstream Urine    Urine Microscopic   Result Value Ref Range    WBC Urine  (A) 0 - 2 /HPF    RBC Urine O - 2 0 - 2 /HPF    Bacteria Urine Few (A) NEG /HPF    Amorphous Crystals Few (A) NEG /HPF       ASSESSMENT/PLAN:     1. Urinary tract infection with hematuria, site unspecified  - nitroFURantoin,  macrocrystal-monohydrate, (MACROBID) 100 MG capsule; Take 1 capsule (100 mg) by mouth 2 times daily for 7 days  Dispense: 14 capsule; Refill: 0  - Patient provided with AVS handout on UTI in women    2. Urinary frequency  - *UA reflex to Microscopic and Culture ([place] and [facility] (except [place] and [place])  - Urine Microscopic    3. Nonspecific finding on examination of urine  - Urine Culture Aerobic Bacterial    Chiquita Medley PA-C  [facility]

## 2017-07-28 NOTE — MR AVS SNAPSHOT
After Visit Summary   7/28/2017    Kate Isaac    MRN: [id number]           Patient Information     Date Of Birth          1935        Visit Information        Provider Department      7/28/2017 1:20 PM Chiquita Medley PA-C [facility]        Today's Diagnoses     Urinary frequency    -  1    Nonspecific finding on examination of urine        Urinary tract infection with hematuria, site unspecified          Care Instructions      Urinary Tract Infections in Women    Urinary tract infections (UTIs) are most often caused by bacteria (germs). These bacteria enter the urinary tract. The bacteria may come from outside the body. Or they may travel from the skin outside the rectum or vagina into the urethra. Female anatomy makes it easy for bacteria from the bowel to enter a woman s urinary tract, which is the most common source of UTI. This means women develop UTIs more often than men. Pain in or around the urinary tract is a common UTI symptom. But the only way to know for sure if you have a UTI for the healthcare provider to test your urine. The two tests that may be done are the urinalysis and urine culture.  Types of UTIs    Cystitis: A bladder infection (cystitis) is the most common UTI in women. You may have urgent or frequent urination. You may also have pain, burning when you urinate, and bloody urine.    Urethritis: This is an inflamed urethra, which is the tube that carries urine from the bladder to outside the body. You may have lower stomach or back pain. You may also have urgent or frequent urination.    Pyelonephritis: This is a kidney infection. If not treated, it can be serious and damage your kidneys. In severe cases, you may be hospitalized. You may have a fever and lower back pain.  Medicines to treat a UTI  Most UTIs are treated with antibiotics. These kill the bacteria. The length of time you need to take them depends on the type of infection. It may be  as short as 3 days. If you have repeated UTIs, a low-dose antibiotic may be needed for several months. Take antibiotics exactly as directed. Don t stop taking them until all of the medicine is gone. If you stop taking the antibiotic too soon, the infection may not go away, and you may develop a resistance to the antibiotic. This can make it much harder to treat.  Lifestyle changes to treat and prevent UTIs  The lifestyle changes below will help get rid of your UTI. They may also help prevent future UTIs.    Drink plenty of fluids. This includes water, juice, or other caffeine-free drinks. Fluids help flush bacteria out of your body.    Empty your bladder. Always empty your bladder when you feel the urge to urinate. And always urinate before going to sleep. Urine that stays in your bladder can lead to infection. Try to urinate before and after sex as well.    Practice good personal hygiene. Wipe yourself from front to back after using the toilet. This helps keep bacteria from getting into the urethra.    Use condoms during sex. These help prevent UTIs caused by sexually transmitted bacteria. Also, avoid using spermicides during sex. These can increase the risk of UTIs. Choose other forms of birth control instead. For women who tend to get UTIs after sex, a low-dose of a preventive antibiotic may be used. Be sure to discuss this option with your healthcare provider.    Follow up with your healthcare provider as directed. He or she may test to make sure the infection has cleared. If needed, more treatment may be started.  Date Last Reviewed: 1/1/2017 2000-2017 [employer]. [address] [place] [address], [place] [address]. All rights reserved. This information is not intended as a substitute for professional medical care. Always follow your healthcare professional's instructions.                Follow-ups after your visit        Your next 10 appointments already scheduled     Aug 02, 2017  9:45 AM CDT    Lymphedema Treatment with [facility] ([facility])    [address] [facility] [address] [phone]              Who to contact     If you have questions or need follow up information about today's clinic visit or your schedule please contact [facility] directly at [phone].  Normal or non-critical lab and imaging results will be communicated to you by [employer], letter or phone within 4 business days after the clinic has received the results. If you do not hear from us within 7 days, please contact the clinic through Qianmit or phone. If you have a critical or abnormal lab result, we will notify you by phone as soon as possible.  Submit refill requests through [employer] or call your pharmacy and they will forward the refill request to us. Please allow 3 business days for your refill to be completed.          Additional Information About Your Visit        [employer]ong Information     [employer] gives you secure access to your electronic health record. If you see a primary care provider, you can also send messages to your care team and make appointments. If you have questions, please call your primary care clinic.  If you do not have a primary care provider, please call [phone] and they will assist you.        Care EveryWhere ID     This is your Care EveryWhere ID. This could be used by other organizations to access your [place] medical records  [id number]        Your Vitals Were     Pulse Temperature Pulse Oximetry             90 97.2  F (36.2  C) (Oral) 99%          Blood Pressure from Last 3 Encounters:   07/28/17 147/81   06/05/17 136/70   12/20/16 119/60    Weight from Last 3 Encounters:   06/05/17 143 lb (64.9 kg)   12/16/16 144 lb (65.3 kg)   10/10/16 143 lb (64.9 kg)              We Performed the Following     *UA reflex to Microscopic and Culture ([place] and [facility] (except [place] and [place])      Urine Culture Aerobic Bacterial     Urine Microscopic          Today's Medication Changes          These changes are accurate as of: 7/28/17  2:02 PM.  If you have any questions, ask your nurse or doctor.               Start taking these medicines.        Dose/Directions    nitroFURantoin (macrocrystal-monohydrate) 100 MG capsule   Commonly known as:  MACROBID   Used for:  Urinary tract infection with hematuria, site unspecified   Started by:  Chiquita Medley PA-C        Dose:  100 mg   Take 1 capsule (100 mg) by mouth 2 times daily for 7 days   Quantity:  14 capsule   Refills:  0            Where to get your medicines      These medications were sent to [facility] [id number]  [place], [place] - [address] [address]  [address], "[employer] [address]     Phone:  [phone]     nitroFURantoin (macrocrystal-monohydrate) 100 MG capsule                Primary Care Provider Office Phone # Fax #    Lani Waters [phone]       [facility] [place] [facility] [address] [facility] [address]        Equal Access to Services     [facility] : Hadii aad ku hadasho Soomaali, waaxda luqadaha, qaybta kaalmada adeegyada, christi colvin . So [facility] [phone].    ATENCIÓN: Si habla español, tiene a macias disposición servicios gratuitos de asistencia lingüística. Llame al [phone].    We comply with applicable federal civil rights laws and [place] laws. We do not discriminate on the basis of race, color, national origin, age, disability sex, sexual orientation or gender identity.            Thank you!     Thank you for choosing [facility]  for your care. Our goal is always to provide you with excellent care. Hearing back from our patients is one way we can continue to improve our services. Please take a few minutes to complete the written survey that you may receive in the mail after your visit with us. Thank you!             Your Updated  Medication List - Protect others around you: Learn how to safely use, store and throw away your medicines at www.disposemymeds.org.          This list is accurate as of: 7/28/17  2:02 PM.  Always use your most recent med list.                   Brand Name Dispense Instructions for use Diagnosis    amLODIPine 5 MG tablet    NORVASC    90 tablet    TAKE 1 TABLET EVERY DAY    Hypertension goal BP (blood pressure) < 140/90       aspirin 81 MG tablet      1 TABLET DAILY        calcium carb 1250 mg (500 mg [place])/vitamin D 200 units 500-200 MG-UNIT per tablet    OSCAL with D     Take 1 tablet by mouth 2 times daily (with meals)        FIBER PO      Take by mouth At Bedtime        FLAX PO           GLUCOSAMINE CHONDROITIN Tabs      1 twice daily        losartan 50 MG tablet    COZAAR    90 tablet    TAKE 1 TABLET EVERY DAY    Hypertension goal BP (blood pressure) < 140/90       nitroFURantoin (macrocrystal-monohydrate) 100 MG capsule    MACROBID    14 capsule    Take 1 capsule (100 mg) by mouth 2 times daily for 7 days    Urinary tract infection with hematuria, site unspecified       * PRESERVISION AREDS 2 Caps           * CENTRUM SILVER ADULT 50+ Tabs      Take 1 capsule by mouth        TYLENOL 500 MG tablet   Generic drug:  acetaminophen      Take 2 tablets (1,000 mg) by mouth 2 times daily as needed for mild pain        * Notice:  This list has 2 medication(s) that are the same as other medications prescribed for you. Read the directions carefully, and ask your doctor or other care provider to review them with you.

## 2017-07-28 NOTE — NURSING NOTE
"Chief Complaint   Patient presents with     UTI       Initial /81  Pulse 90  Temp 97.2  F (36.2  C) (Oral)  SpO2 99% Estimated body mass index is 24.93 kg/(m^2) as calculated from the following:    Height as of 12/16/16: 5' 3.5\" (1.613 m).    Weight as of 6/5/17: 143 lb (64.9 kg).  Medication Reconciliation: complete  "

## 2017-07-30 LAB
BACTERIA SPEC CULT: ABNORMAL
MICRO REPORT STATUS: ABNORMAL
MICROORGANISM SPEC CULT: ABNORMAL
SPECIMEN SOURCE: ABNORMAL

## 2017-08-02 ENCOUNTER — HOSPITAL ENCOUNTER (OUTPATIENT)
Dept: OCCUPATIONAL THERAPY | Facility: CLINIC | Age: 82
Setting detail: THERAPIES SERIES
End: 2017-08-02
Attending: FAMILY MEDICINE
Payer: MEDICARE

## 2017-08-02 PROCEDURE — 97535 SELF CARE MNGMENT TRAINING: CPT | Mod: GO | Performed by: OCCUPATIONAL THERAPIST

## 2017-08-02 PROCEDURE — G8989 SELF CARE D/C STATUS: HCPCS | Mod: GO,CI | Performed by: OCCUPATIONAL THERAPIST

## 2017-08-02 PROCEDURE — G8988 SELF CARE GOAL STATUS: HCPCS | Mod: GO,CI | Performed by: OCCUPATIONAL THERAPIST

## 2017-08-02 PROCEDURE — G8987 SELF CARE CURRENT STATUS: HCPCS | Mod: GO,CI | Performed by: OCCUPATIONAL THERAPIST

## 2017-08-02 PROCEDURE — 40000445 ZZHC STATISTIC OT VISIT, LYMPHEDEMA: Performed by: OCCUPATIONAL THERAPIST

## 2017-08-02 NOTE — PROGRESS NOTES
Outpatient Occupational Therapy Discharge Note     Patient: Madelyn Amador  : 1935  Insurance:   Payor/Plan Subscriber Name Rel Member # Group #   MEDICARE - MEDICARE F* MADELYN AMADOR  [id number]       ATTN CLAIMS, [address]   BCBS - BCBS PLATINUM * MADELYN AMADOR  [id number]      [address] [address]       Beginning/End Dates of Reporting Period:  17 to 2017    Referring Provider: KOBI Rai Diagnosis: RUE lymphedema    Client Self Report:       Objective Measurements:     Objective Measure: assessment   Details: RUE with no edema of digits and wrist.  Trace edema from mid-forearm to axilla.   Objective Measure: measurement   Details: As compared to initial evaluation pt has demonstrated a 190mL reduction in RUE volume   Objective Measure: LLIS   Details: A score of 1; a 5 point reduction from initial assessment          Outcome Measures (most recent score):  Lymphedema Life Impact Scale (score range 0-72). A higher score indicates greater impairment.: 1      Goals:   Goal Identifier home program   Goal Description Pt will demonstrate IND with home program including: GCB to RUE , self MLD and HEP to reduce edema to improve skin integrity, prevent infection and to be fit for compression sleeve/hand piece for edema management at discharge.   Target Date 17   Date Met  17   Progress: MEt     Goal Identifier volume   Goal Description Pt will demonstrate a 200mL reduction in RUE volume to prevent progression of lymphedema and to be fit [facility]    Target Date 17   Date Met  17   Progress: MET     Goal Identifier discharge   Goal Description Pt will be IND with donning compression sleeve and hand piece and verbalizing wear/wash/replace schedule for edema management at discharge.    Target Date 17   Date Met  17   Progress: MET         Progress Toward Goals:   All goals  MET        Plan:  Discharge from therapy.    Discharge:    Reason for Discharge: Patient has met all goals.    Equipment Issued: [facility] materials    Discharge Plan: Patient to continue home program: day wear of 20-30mmHg compression sleeve and hand piece, night wear of tubular compression, self MLD E/O day.

## 2017-08-19 ENCOUNTER — RADIANT APPOINTMENT (OUTPATIENT)
Dept: GENERAL RADIOLOGY | Facility: CLINIC | Age: 82
End: 2017-08-19
Attending: PEDIATRICS
Payer: COMMERCIAL

## 2017-08-19 ENCOUNTER — OFFICE VISIT (OUTPATIENT)
Dept: ORTHOPEDICS | Facility: CLINIC | Age: 82
End: 2017-08-19
Payer: COMMERCIAL

## 2017-08-19 VITALS
WEIGHT: 143 LBS | SYSTOLIC BLOOD PRESSURE: 140 MMHG | DIASTOLIC BLOOD PRESSURE: 80 MMHG | BODY MASS INDEX: 24.41 KG/M2 | HEIGHT: 64 IN

## 2017-08-19 DIAGNOSIS — G89.29 CHRONIC RIGHT HIP PAIN: ICD-10-CM

## 2017-08-19 DIAGNOSIS — M25.551 CHRONIC RIGHT HIP PAIN: ICD-10-CM

## 2017-08-19 DIAGNOSIS — G89.29 CHRONIC RIGHT HIP PAIN: Primary | ICD-10-CM

## 2017-08-19 DIAGNOSIS — M70.61 TROCHANTERIC BURSITIS OF RIGHT HIP: ICD-10-CM

## 2017-08-19 DIAGNOSIS — M25.551 CHRONIC RIGHT HIP PAIN: Primary | ICD-10-CM

## 2017-08-19 PROCEDURE — 73502 X-RAY EXAM HIP UNI 2-3 VIEWS: CPT

## 2017-08-19 PROCEDURE — 20610 DRAIN/INJ JOINT/BURSA W/O US: CPT | Mod: RT | Performed by: PEDIATRICS

## 2017-08-19 PROCEDURE — 99203 OFFICE O/P NEW LOW 30 MIN: CPT | Mod: 25 | Performed by: PEDIATRICS

## 2017-08-19 RX ORDER — TRIAMCINOLONE ACETONIDE 40 MG/ML
40 INJECTION, SUSPENSION INTRA-ARTICULAR; INTRAMUSCULAR ONCE
Qty: 1 ML | Refills: 0 | OUTPATIENT
Start: 2017-08-19 | End: 2017-08-19

## 2017-08-19 NOTE — PATIENT INSTRUCTIONS
Bursa injection completed today.  Schedule physical therapy with [facility], [phone]  Follow up with Dr Lucia 6 - 8 weeks after starting physical therapy if not getting relief.

## 2017-08-19 NOTE — MR AVS SNAPSHOT
After Visit Summary   8/19/2017    Kate Isaac    MRN: [id number]           Patient Information     Date Of Birth          1935        Visit Information        Provider Department      8/19/2017 11:20 AM Casey Lucia,  [facility]        Today's Diagnoses     Chronic right hip pain    -  1    Trochanteric bursitis of right hip          Care Instructions    Bursa injection completed today.  Schedule physical therapy with [facility], [phone]  Follow up with Dr Lucia 6 - 8 weeks after starting physical therapy if not getting relief.          Follow-ups after your visit        Additional Services     [facility] PT, HAND, AND CHIROPRACTIC REFERRAL       **This order will print in the [facility] Scheduling Office**    Physical Therapy, Hand Therapy and Chiropractic Care are available through:    *[facility]  *[facility]  *[facility]    Call one number to schedule at any of the above locations: [phone].    Your provider has referred you to: Physical Therapy at [facility] or [facility]    Indication/Reason for Referral: Hip Pain  Onset of Illness:     Therapy Orders: Evaluate and Treat  Special Programs: None  Special Request: None    Celia Yang      Additional Comments for the Therapist or Chiropractor: previous patient for hip/back pain, resume PT after injection      Please be aware that coverage of these services is subject to the terms and limitations of your health insurance plan.  Call member services at your health plan with any benefit or coverage questions.      Please bring the following to your appointment:    *Your personal calendar for scheduling future appointments  *Comfortable clothing                  Who to contact     If you have questions or need follow up information about today's clinic visit or your schedule please contact [facility] directly at  "[phone].  Normal or non-critical lab and imaging results will be communicated to you by MobiKwikhart, letter or phone within 4 business days after the clinic has received the results. If you do not hear from us within 7 days, please contact the clinic through MobiKwikhart or phone. If you have a critical or abnormal lab result, we will notify you by phone as soon as possible.  Submit refill requests through Keukey or call your pharmacy and they will forward the refill request to us. Please allow 3 business days for your refill to be completed.          Additional Information About Your Visit        MobiKwikhart Information     Keukey gives you secure access to your electronic health record. If you see a primary care provider, you can also send messages to your care team and make appointments. If you have questions, please call your primary care clinic.  If you do not have a primary care provider, please call [phone] and they will assist you.        Care EveryWhere ID     This is your Care EveryWhere ID. This could be used by other organizations to access your [place] medical records  [id number]        Your Vitals Were     Height BMI (Body Mass Index)                5' 3.5\" (1.613 m) 24.93 kg/m2           Blood Pressure from Last 3 Encounters:   08/19/17 140/80   07/28/17 147/81   06/05/17 136/70    Weight from Last 3 Encounters:   08/19/17 143 lb (64.9 kg)   06/05/17 143 lb (64.9 kg)   12/16/16 144 lb (65.3 kg)              We Performed the Following     DRAIN/INJECT LARGE JOINT/BURSA     [facility] PT, HAND, AND CHIROPRACTIC REFERRAL     TRIAMCINOLONE ACET INJ NOS          Today's Medication Changes          These changes are accurate as of: 8/19/17 12:52 PM.  If you have any questions, ask your nurse or doctor.               Start taking these medicines.        Dose/Directions    triamcinolone acetonide 40 MG/ML injection   Commonly known as:  KENALOG-40   Used for:  Chronic right hip pain, Trochanteric bursitis of right " hip   Started by:  Casey Lucia,         Dose:  40 mg   1 mL (40 mg) by INTRA-ARTICULAR route once for 1 dose   Quantity:  1 mL   Refills:  0            Where to get your medicines      Some of these will need a paper prescription and others can be bought over the counter.  Ask your nurse if you have questions.     You don't need a prescription for these medications     triamcinolone acetonide 40 MG/ML injection                Primary Care Provider Office Phone # Fax #    Lani Waters [phone] [address] [facility] [address]        Equal Access to Services     [facility]: Hadii chad ford hadasho Solitzy, waaxda luqadaha, qaybta kaalmada shannayamarcelo, christi colvin . So [facility] [phone].    ATENCIÓN: Si habla español, tiene a macias disposición servicios gratuitos de asistencia lingüística. [facility] [phone].    We comply with applicable federal civil rights laws and [place] laws. We do not discriminate on the basis of race, color, national origin, age, disability sex, sexual orientation or gender identity.            Thank you!     Thank you for choosing [facility]  for your care. Our goal is always to provide you with excellent care. Hearing back from our patients is one way we can continue to improve our services. Please take a few minutes to complete the written survey that you may receive in the mail after your visit with us. Thank you!             Your Updated Medication List - Protect others around you: Learn how to safely use, store and throw away your medicines at www.disposemymeds.org.          This list is accurate as of: 8/19/17 12:52 PM.  Always use your most recent med list.                   Brand Name Dispense Instructions for use Diagnosis    amLODIPine 5 MG tablet    NORVASC    90 tablet    TAKE 1 TABLET EVERY DAY    Hypertension goal BP (blood pressure) < 140/90       aspirin 81 MG tablet       1 TABLET DAILY        calcium carb 1250 mg (500 mg [place])/vitamin D 200 units 500-200 MG-UNIT per tablet    OSCAL with D     Take 1 tablet by mouth 2 times daily (with meals)        FIBER PO      Take by mouth At Bedtime        FLAX PO           GLUCOSAMINE CHONDROITIN Tabs      1 twice daily        losartan 50 MG tablet    COZAAR    90 tablet    TAKE 1 TABLET EVERY DAY    Hypertension goal BP (blood pressure) < 140/90       * PRESERVISION AREDS 2 Caps           * CENTRUM SILVER ADULT 50+ Tabs      Take 1 capsule by mouth        triamcinolone acetonide 40 MG/ML injection    KENALOG-40    1 mL    1 mL (40 mg) by INTRA-ARTICULAR route once for 1 dose    Chronic right hip pain, Trochanteric bursitis of right hip       TYLENOL 500 MG tablet   Generic drug:  acetaminophen      Take 2 tablets (1,000 mg) by mouth 2 times daily as needed for mild pain        * Notice:  This list has 2 medication(s) that are the same as other medications prescribed for you. Read the directions carefully, and ask your doctor or other care provider to review them with you.

## 2017-08-19 NOTE — NURSING NOTE
"Chief Complaint   Patient presents with     Musculoskeletal Problem     right lateral hip pain > 6 months       Initial /80  Ht 5' 3.5\" (1.613 m)  Wt 143 lb (64.9 kg)  BMI 24.93 kg/m2 Estimated body mass index is 24.93 kg/(m^2) as calculated from the following:    Height as of this encounter: 5' 3.5\" (1.613 m).    Weight as of this encounter: 143 lb (64.9 kg).  Medication Reconciliation: complete     Corbin Salgado ATC  "

## 2017-08-19 NOTE — Clinical Note
I had the opportunity to see Kate CARROLL Isaac in [facility] clinic for her right lateral hip pain. Please see chart for details of the visit. Thanks.

## 2017-08-19 NOTE — PROGRESS NOTES
Sports Medicine Clinic Visit    PCP: Lani Waters    Kate CARROLL Isaac is a 81 year old female who is seen  in consultation at the request of  Lani Waters M.D. presenting with right lateral hip pain.    **  Patient is very active year round and she feels that it helps with her right hip.  Pain is in the lateral hip and she is experiencing a new pain across her low back.  Pain does not go into the anterior hip or into the groin.  Sleeping on the right side is sore.      Cortisone shot in the left knee was minimally beneficial in past.        Injury: Gradual onset of right lateral hip pain, mostly with activity.    Location of Pain: right lateral hip  Duration of Pain: 6+ months  Rating of Pain at worst: 7/10  Rating of Pain Currently: 5/10  Symptoms are better with: Tylenol, Rest and stretching  Symptoms are worse with: golfing, walking, lying on right side  Additional Features:   Positive: swelling and snapping   Negative: bruising, neurologic symptoms  Other evaluation and/or treatments so far consists of: Tylenol, Rest and PCP consult, physical therapy  Prior History of related problems: H/o OA in multiple joints, mostly knee.  H/o intermittent low back pain that mostly treated with physical therapy earlier this year.    Social History: retired - walks, golfs several days per week    Review of Systems  Musculoskeletal: as above  Remainder of review of systems is negative including constitutional, CV, pulmonary, GI, Skin and Neurologic except as noted in HPI or medical history.    This document serves as a record of the services and decisions personally performed and made by Casey Lucia DO, CAQ. It was created on his behalf by Jericho Orr, a trained medical scribe. The creation of this document is based the provider's statements to the medical scribe.  Jericho Orr August 19, 2017 12:17 PM     Past Medical History:   Diagnosis Date     Arthritis 02/01/2014    Per pt had it since FEB Basal  cell cancer     sees dermatologist     Breast cancer (H) 1996    invasive ductal     HTN      Osteopenia      Recurrent UTI      Past Surgical History:   Procedure Laterality Date     BIOPSY  1996 2004,2010    basal cell cancers     BUNIONECTOMY RT/LT       [facility] SURGICAL PATHOLOGY      ganglion cyst removal     COLONOSCOPY       GENITOURINARY SURGERY       HC EXCISION BREAST LESION, OPEN >=1  1996    right breast     SLING TRANSVAGINAL  12/9/2013    Procedure: SLING TRANSVAGINAL;  Cysto with TVT;  Surgeon: Denia Shultz MD;  Location: [facility] OR     SURGICAL HISTORY OF -   1959    right thigh tumor removal/benign     Family History   Problem Relation Age of Onset     HEART DISEASE Mother      cad at age 70s     CANCER Mother      KIDNEY     DIABETES Mother      Coronary Artery Disease Mother      Hypertension Mother      HEART DISEASE Father      chf     Neurologic Disorder Father      PARKINSONS     Alzheimer Disease Father      HEART DISEASE Maternal Grandmother      chf     Coronary Artery Disease Maternal Grandmother      CANCER Maternal Grandfather      ?     HEART DISEASE Paternal Grandmother      Coronary Artery Disease Paternal Grandmother      HEART DISEASE Paternal Grandfather      Neurologic Disorder Paternal Grandfather      PARKINSONS     Blood Disease Brother      LEUKEMIA     Breast Cancer Other      DIABETES Other      Mother's sister's daughter     CANCER Daughter      CERVICAL     CANCER Other      THYROID     CEREBROVASCULAR DISEASE Other      Breast Cancer Daughter      Social History     Social History     Marital status:      Spouse name: N/A     Number of children: N/A     Years of education: N/A     Occupational History     Not on file.     Social History Main Topics     Smoking status: Former Smoker     Packs/day: 1.00     Years: 10.00     Types: Cigarettes     Start date: 1/1/1956     Quit date: 1/1/1985     Smokeless tobacco: Never Used      Comment: I stopped smoking several  "times from 1959 to 1985     Alcohol use 1.5 - 2.0 oz/week      Comment: Red wine     Drug use: No     Sexual activity: Yes     Partners: Male     Birth control/ protection: Post-menopausal, Female Surgical      Comment: Tubal in 1965 & now I am post-menopausal     Other Topics Concern      Service No     Blood Transfusions No     Caffeine Concern No     Occupational Exposure No     Hobby Hazards No     Sleep Concern No     Stress Concern No     Weight Concern No     Special Diet No     Back Care No     Exercise No     Bike Helmet No     Seat Belt No     Self-Exams No     Parent/Sibling W/ Cabg, Mi Or Angioplasty Before 65f 55m? No     Social History Narrative       Objective  /80  Ht 5' 3.5\" (1.613 m)  Wt 143 lb (64.9 kg)  BMI 24.93 kg/m2      GENERAL APPEARANCE: healthy, alert and no distress   GAIT: NORMAL  SKIN: no suspicious lesions or rashes  NEURO: Normal strength and tone, mentation intact and speech normal  PSYCH:  mentation appears normal and affect normal/bright  HEENT: no scleral icterus  CV: no extremity edema   RESP: nonlabored breathing    Exams:  Right hip exam    Inspection:        no edema or ecchymosis in hip area    ROM:       Flexion full, no change in pain        internal rotation tightness, but no change in pain       external rotation tightness, but no change in pain       Abduction full, no change in pain     Strength:        abduction 5-/5       adduction 5/5     No change in pain with above     Tender:        greater trochanter    Non Tender:        remainder of hip area    Sensation:        grossly intact in hip and thigh    Skin:       well perfused       capillary refill brisk    Special Tests:        neg (-) Ananya, no change in pain        Log roll - negative on the left, but increased pain in the back on the right              Radiology  Visualized radiographs of pelvis and right hip obtained today, and reviewed the images with the patient.  Impression: no acute " findings. Joint spaces appear fairly well preserved.  XR Pelvis w Hip Right 1 View    Narrative    PELVIS WITH RIGHT HIP LATERAL TWO VIEWS 8/19/2017 12:01 PM     HISTORY: Pain in right hip, other chronic pain    COMPARISON: None.      Impression    IMPRESSION: Normal right hip. Heterogeneous bone marrow in the  intertrochanteric region of the left femur of indeterminate etiology  with mixed sclerotic and radiolucent bone marrow.    HORTENCIA GAYTAN MD         Assessment:  1. Chronic right hip pain    2. Trochanteric bursitis of right hip        Plan:  Discussed the assessment with the patient and her .    Plain films of the area reviewed with the patient and her .    Options:  *Symptom Treatment   *Activity Modification  *Rehab - Home exercises v Formal PT    *Imaging   *Injection - Corticosteroid injection     Topical Treatments: Ice, Heat or Topical Analgesics prn   Over the counter medication: Patient's preferred OTC medication as directed on packaging.  Activity Modification: as discussed   Rehab: Physical Therapy: [facility] - [phone]; plan to return for a few visits following anticipated relief from injection   Steroid injection of the right hip: trochanteric bursa was performed today in clinic  Icing for the next 1-2 days may be helpful for pain. Injection may take 10-14 days to see the full effect.    Discussed steroid injection, including risks, potential benefits, and alternatives.  The patient expressed understanding.  Obtained verbal and written consent and the patient elected to proceed.  Procedure: A steroid injection was performed under aseptic technique at right hip: trochanteric bursae using 1% plain Lidocaine and 40 mg of Kenalog. Bandage applied. This was well tolerated.    Follow up: ~6 weeks if not improving with above.   Questions answered. The patient indicates understanding of these issues and agrees with the plan.     Casey Lucia, , CAQ    CC:  Lani Waters        Disclaimer: This note consists of symbols derived from keyboarding, dictation and/or voice recognition software. As a result, there may be errors in the script that have gone undetected. Please consider this when interpreting information found in this chart.    The information in this document, created by the medical scribe for me, accurately reflects the services I personally performed and the decisions made by me. I have reviewed and approved this document for accuracy.   Casey Lucia DO, CAQ

## 2017-08-23 ENCOUNTER — OFFICE VISIT (OUTPATIENT)
Dept: FAMILY MEDICINE | Facility: CLINIC | Age: 82
End: 2017-08-23
Payer: COMMERCIAL

## 2017-08-23 VITALS
SYSTOLIC BLOOD PRESSURE: 140 MMHG | WEIGHT: 137 LBS | OXYGEN SATURATION: 97 % | HEART RATE: 95 BPM | DIASTOLIC BLOOD PRESSURE: 60 MMHG | TEMPERATURE: 98 F | BODY MASS INDEX: 23.89 KG/M2

## 2017-08-23 DIAGNOSIS — I10 ESSENTIAL HYPERTENSION WITH GOAL BLOOD PRESSURE LESS THAN 140/90: ICD-10-CM

## 2017-08-23 DIAGNOSIS — I49.9 IRREGULAR HEARTBEAT: Primary | ICD-10-CM

## 2017-08-23 DIAGNOSIS — C44.91 MALIGNANT BASAL CELL NEOPLASM OF SKIN: ICD-10-CM

## 2017-08-23 DIAGNOSIS — L98.9 SKIN LESION: ICD-10-CM

## 2017-08-23 PROCEDURE — 93000 ELECTROCARDIOGRAM COMPLETE: CPT | Performed by: FAMILY MEDICINE

## 2017-08-23 PROCEDURE — 99214 OFFICE O/P EST MOD 30 MIN: CPT | Performed by: FAMILY MEDICINE

## 2017-08-23 NOTE — NURSING NOTE
"Chief Complaint   Patient presents with     Irregular Heart Beat       Initial /70  Pulse 95  Temp 98  F (36.7  C) (Oral)  Wt 137 lb (62.1 kg)  SpO2 97%  BMI 23.89 kg/m2 Estimated body mass index is 23.89 kg/(m^2) as calculated from the following:    Height as of 8/19/17: 5' 3.5\" (1.613 m).    Weight as of this encounter: 137 lb (62.1 kg).  Medication Reconciliation: complete  "

## 2017-08-23 NOTE — MR AVS SNAPSHOT
After Visit Summary   8/23/2017    Kate Isaac    MRN: [id number]           Patient Information     Date Of Birth          1935        Visit Information        Provider Department      8/23/2017 2:40 PM Ashley Pineda MD [facility]        Today's Diagnoses     Irregular heartbeat    -  1    Essential hypertension with goal blood pressure less than 140/90        Malignant basal cell neoplasm of skin        Skin lesion           Follow-ups after your visit        Your next 10 appointments already scheduled     Aug 28, 2017 10:10 AM CDT   KEVIN Extremity with Arian Wynne PT   [facility] ([facility])    [address]  [place] [facility] [place] [address] [phone]              Future tests that were ordered for you today     Open Future Orders        Priority Expected Expires Ordered    Zio Patch 48 Hours Routine  10/7/2017 8/23/2017            Who to contact     If you have questions or need follow up information about today's clinic visit or your schedule please contact [facility] directly at [phone].  Normal or non-critical lab and imaging results will be communicated to you by [employer] Information Technologyhart, letter or phone within 4 business days after the clinic has received the results. If you do not hear from us within 7 days, please contact the clinic through [employer] Information Technology[employer] or phone. If you have a critical or abnormal lab result, we will notify you by phone as soon as possible.  Submit refill requests through LogMeIn or call your pharmacy and they will forward the refill request to us. Please allow 3 business days for your refill to be completed.          Additional Information About Your Visit        MyChart Information     LogMeIn gives you secure access to your electronic health record. If you see a primary care provider, you can also send messages to your care team and make appointments. If you have  questions, please call your primary care clinic.  If you do not have a primary care provider, please call [phone] and they will assist you.        Care EveryWhere ID     This is your Care EveryWhere ID. This could be used by other organizations to access your [place] medical records  [id number]        Your Vitals Were     Pulse Temperature Pulse Oximetry BMI (Body Mass Index)          95 98  F (36.7  C) (Oral) 97% 23.89 kg/m2         Blood Pressure from Last 3 Encounters:   08/23/17 140/60   08/19/17 140/80   07/28/17 147/81    Weight from Last 3 Encounters:   08/23/17 137 lb (62.1 kg)   08/19/17 143 lb (64.9 kg)   06/05/17 143 lb (64.9 kg)              We Performed the Following     EKG 12-lead complete w/read - Clinics        Primary Care Provider Office Phone # Fax #    Lani Waters [phone] [address] [facility] [address]        Equal Access to Services     [facility]: Hadii aad ku hadasho Soomaali, waaxda luqadaha, qaybta kaalmada adeegyada, waxay idiin hayaan shanna colvin . So [facility] [phone].    ATENCIÓN: Si habla español, tiene a macias disposición servicios gratuitos de asistencia lingüística. Llame al [phone].    We comply with applicable federal civil rights laws and [place] laws. We do not discriminate on the basis of race, color, national origin, age, disability sex, sexual orientation or gender identity.            Thank you!     Thank you for choosing [facility]  for your care. Our goal is always to provide you with excellent care. Hearing back from our patients is one way we can continue to improve our services. Please take a few minutes to complete the written survey that you may receive in the mail after your visit with us. Thank you!             Your Updated Medication List - Protect others around you: Learn how to safely use, store and throw away your medicines at www.disposemymeds.org.          This list is accurate as of:  8/23/17 11:36 PM.  Always use your most recent med list.                   Brand Name Dispense Instructions for use Diagnosis    amLODIPine 5 MG tablet    NORVASC    90 tablet    TAKE 1 TABLET EVERY DAY    Hypertension goal BP (blood pressure) < 140/90       aspirin 81 MG tablet      1 TABLET DAILY        calcium carb 1250 mg (500 mg [place])/vitamin D 200 units 500-200 MG-UNIT per tablet    OSCAL with D     Take 1 tablet by mouth 2 times daily (with meals)        FIBER PO      Take by mouth At Bedtime        FLAX PO           GLUCOSAMINE CHONDROITIN Tabs      1 twice daily        losartan 50 MG tablet    COZAAR    90 tablet    TAKE 1 TABLET EVERY DAY    Hypertension goal BP (blood pressure) < 140/90       * PRESERVISION AREDS 2 Caps           * CENTRUM SILVER ADULT 50+ Tabs      Take 1 capsule by mouth        TYLENOL 500 MG tablet   Generic drug:  acetaminophen      Take 2 tablets (1,000 mg) by mouth 2 times daily as needed for mild pain        * Notice:  This list has 2 medication(s) that are the same as other medications prescribed for you. Read the directions carefully, and ask your doctor or other care provider to review them with you.

## 2017-08-23 NOTE — PROGRESS NOTES
SUBJECTIVE:   Kate Isaac is a 81 year old female who presents to clinic today for the following health issues:      IRREGULAR HEART RATE       Duration:STARTED TODAY AT BLOOD BANK    Description (location/character/radiation): pt feels nothing abnormal    Intensity:  none    Accompanying signs and symptoms: none    History (similar episodes/previous evaluation): None    Precipitating or alleviating factors: None    Therapies tried and outcome: None     Today was not allowed to donate blood because of on exam patient had an irregular pulse  Asymptomatic otherwise  She does remember being told once before a while back that her pulse was irregular  No fevers or chills chest pain or shortness of breath  No orthopnea pnd or edema    BP has been fluctuating control as well although patient states usually normal at home     Allergies   Allergen Reactions     Ceftriaxone Swelling     Sulfa Drugs Rash     Lisinopril Fatigue       Past Medical History:   Diagnosis Date     Arthritis 02/01/2014    Per pt had it since FEB     Basal cell cancer     sees dermatologist     Breast cancer (H) 1996    invasive ductal     HTN      Osteopenia      Recurrent UTI          Current Outpatient Prescriptions on File Prior to Visit:  losartan (COZAAR) 50 MG tablet TAKE 1 TABLET EVERY DAY   amLODIPine (NORVASC) 5 MG tablet TAKE 1 TABLET EVERY DAY   acetaminophen (TYLENOL) 500 MG tablet Take 2 tablets (1,000 mg) by mouth 2 times daily as needed for mild pain   calcium carb 1250 mg, 500 mg [place],/vitamin D 200 units (OSCAL WITH D) 500-200 MG-UNIT per tablet Take 1 tablet by mouth 2 times daily (with meals)   FIBER PO Take by mouth At Bedtime   Multiple Vitamins-Minerals (PRESERVISION AREDS 2) CAPS    Multiple Vitamins-Minerals (CENTRUM SILVER ADULT 50+) TABS Take 1 capsule by mouth    Flaxseed, Linseed, (FLAX PO)    ASPIRIN 81 MG OR TABS 1 TABLET DAILY   GLUCOSAMINE CHONDROITIN OR TABS 1 twice daily     No current facility-administered  medications on file prior to visit.     Social History   Substance Use Topics     Smoking status: Former Smoker     Packs/day: 1.00     Years: 10.00     Types: Cigarettes     Start date: 1/1/1956     Quit date: 1/1/1985     Smokeless tobacco: Never Used      Comment: I stopped smoking several times from 1959 to 1985     Alcohol use 1.5 - 2.0 oz/week      Comment: Red wine       ROS:  10 point review of systems negative except for noted above.   No thoughts of harming self or others.     OBJECTIVE:  /60  Pulse 95  Temp 98  F (36.7  C) (Oral)  Wt 137 lb (62.1 kg)  SpO2 97%  BMI 23.89 kg/m2   General:   awake, alert, and cooperative.  NAD.   Head: Normocephalic, atraumatic.  Eyes: Conjunctiva clear,   Heart: Regular rate and rhythm. No murmur.  Lungs: Chest is clear; no wheezes or rales.   Abdomen: soft non-tender.  Neuro: Alert and oriented - normal speech.  MS: Using extremities freely  PSYCH:  Normal affect, normal speech. No thoughts of harming self or others   SKIN: no obvious rashes. Basal cell noted on left cheek  Left lower eyelid has an erythematous pinpoint skin lesion which is new per patient     ASSESSMENT:    ICD-10-CM    1. Irregular heartbeat I49.9 EKG 12-lead complete w/read - Clinics     Zio Patch 48 Hours   2. Essential hypertension with goal blood pressure less than 140/90 I10    3. Malignant basal cell neoplasm of skin C44.91    4. Skin lesion L98.9          PLAN:     Exam normal here today  ekg done to my review also normal  Patient states she was taking in very deliberated deep breaths at the blood bank and discussed with patient that could sometimes cause a sinus arrhthmia related to breathing which is entirely normal and benign.  However patient has strong heart family history and was told in the past she had irregular pulse hence we will check a holter monitor  BP not at goal but per patient always normal at home. Recommend recheck in a week with bringing her BP machine to check for  accuracy  Alarm signs or symptoms discussed, if present recommend go to ER   Skin lesion in left eye small and hard to characterize, with history of skin cancer recommend follow up with derm. She will schedule appiontment   Advised about symptoms which might herald more serious problems.        Ashley Pineda MD

## 2017-08-28 ENCOUNTER — THERAPY VISIT (OUTPATIENT)
Dept: PHYSICAL THERAPY | Facility: CLINIC | Age: 82
End: 2017-08-28
Payer: MEDICARE

## 2017-08-28 DIAGNOSIS — M25.551 HIP PAIN, RIGHT: Primary | ICD-10-CM

## 2017-08-28 PROBLEM — M54.50 RIGHT-SIDED LOW BACK PAIN WITHOUT SCIATICA: Status: RESOLVED | Noted: 2017-05-08 | Resolved: 2017-08-28

## 2017-08-28 PROCEDURE — G8979 MOBILITY GOAL STATUS: HCPCS | Mod: GP | Performed by: PHYSICAL THERAPIST

## 2017-08-28 PROCEDURE — 97110 THERAPEUTIC EXERCISES: CPT | Mod: GP | Performed by: PHYSICAL THERAPIST

## 2017-08-28 PROCEDURE — G8978 MOBILITY CURRENT STATUS: HCPCS | Mod: GP | Performed by: PHYSICAL THERAPIST

## 2017-08-28 PROCEDURE — 97161 PT EVAL LOW COMPLEX 20 MIN: CPT | Mod: GP | Performed by: PHYSICAL THERAPIST

## 2017-08-28 ASSESSMENT — ACTIVITIES OF DAILY LIVING (ADL)
WALKING_UP_STEEP_HILLS: NO DIFFICULTY AT ALL
STEPPING_UP_AND_DOWN_CURBS: NO DIFFICULTY AT ALL
GOING_UP_1_FLIGHT_OF_STAIRS: NO DIFFICULTY AT ALL
WALKING_15_MINUTES_OR_GREATER: NO DIFFICULTY AT ALL
HOS_ADL_SCORE(%): 88.24
SITTING_FOR_15_MINUTES: NO DIFFICULTY AT ALL
HOS_ADL_COUNT: 17
PUTTING_ON_SOCKS_AND_SHOES: NO DIFFICULTY AT ALL
WALKING_INITIALLY: NO DIFFICULTY AT ALL
DEEP_SQUATTING: UNABLE TO DO
HOS_ADL_ITEM_SCORE_TOTAL: 60
WALKING_APPROXIMATELY_10_MINUTES: NO DIFFICULTY AT ALL
WALKING_DOWN_STEEP_HILLS: NO DIFFICULTY AT ALL
HOS_ADL_HIGHEST_POTENTIAL_SCORE: 68
HEAVY_WORK: UNABLE TO DO
LIGHT_TO_MODERATE_WORK: NO DIFFICULTY AT ALL
GETTING_INTO_AND_OUT_OF_A_BATHTUB: NO DIFFICULTY AT ALL
ROLLING_OVER_IN_BED: NO DIFFICULTY AT ALL
TWISTING/PIVOTING_ON_INVOLVED_LEG: NO DIFFICULTY AT ALL
GOING_DOWN_1_FLIGHT_OF_STAIRS: NO DIFFICULTY AT ALL
RECREATIONAL_ACTIVITIES: NO DIFFICULTY AT ALL
GETTING_INTO_AND_OUT_OF_AN_AVERAGE_CAR: NO DIFFICULTY AT ALL
STANDING_FOR_15_MINUTES: NO DIFFICULTY AT ALL

## 2017-08-28 NOTE — PROGRESS NOTES
[facility] [facility] Initial Evaluation      Subjective:    Patient is a 81 year old female presenting with rehab right hip hpi.   Kate Isaac is a 81 year old female with a right hip condition.  Condition occurred with:  Insidious onset.  Condition occurred: for unknown reasons.  This is a chronic and recurrent condition  Pt reports recurrent R hip pain over the last six months (Feb 2017).  Had PT in May 2017 for similar symptoms and it improved with lumbar extension in standing.  Reports that she backed off on that stretch as it made her back sore. The symptoms gradually increased again and was referred to Dr. Hummel at [facility] by her primary care MD (Aug 2017).  Given cortisone injection which has helped her pain significantly.  Currently reports that she gets slight achey pain in R buttock and occasional LBP.  .    Patient reports pain:  Posterior.  Radiates to:  No radiation.  Pain is described as aching and is intermittent and reported as 2/10.   Pain is the same all the time.  Symptoms are exacerbated by other (unsure (when more sore aggravated by sit to stand)) and relieved by heat and ice.  Since onset symptoms are rapidly improving.  Special tests:  X-ray (normal R hip).  Previous treatment includes other (cortisone injection).    General health as reported by patient is good.  Pertinent medical history includes:  Osteoporosis, cancer and high blood pressure (osteopenia).  Medical allergies: yes (sulfa).  Other surgeries include:  Cancer surgery and other (breast cancer 1996, basal cell 2004, 2017).  Current medications:  High blood pressure medication and meds to increase bone density.  Current occupation is retired  .            Red flags:  None as reported by the patient.                        Objective:    Standing Alignment:        Lumbar:  Lordosis decr                Flexibility/Screens:           Lower Extremity:  Normal             Lumbar/SI Evaluation  ROM:    AROM Lumbar:    Flexion:          Nil  Ext:                    Min loss   Side Bend:        Left:     Right:   Rotation:           Left:     Right:   Side Glide:        Left:  Nil    Right:  Nil                                                              Hip Evaluation  HIP AROM:  AROM:    Left Hip:     Normal    Right Hip:   Normal                    Hip Strength:    Flexion:   Left: 4+/5   Pain:  Right: 4+/5   Pain:                      Abduction:  Left: 4/5     Pain:Right: 4/5    Pain:        Knee Flexion:  Left: 5/5   Pain:Right: 5/5   Pain:  Knee Extension:  Left: 5/5   Pain:Right: 5/5    Pain:        Hip Special Testing:   Not Assessed        Hip Palpation:      Right hip tenderness present at:  Greater Trachanter and IT Band                 General Evaluation:          Lower Extremity Flexibility:  normal                                                                             ROS    Assessment/Plan:      Patient is a 81 year old female with right side hip complaints.    Patient has the following significant findings with corresponding treatment plan.                Diagnosis 1:   R hip pain/trochanteric bursitis  Pain -  manual therapy, self management, education, directional preference exercise and home program  Decreased ROM/flexibility - manual therapy, therapeutic exercise, therapeutic activity and home program  Decreased strength - therapeutic exercise, therapeutic activities and home program  Inflammation - self management/home program  Decreased function - therapeutic activities and home program  Impaired posture - neuro re-education, therapeutic activities and home program    Therapy Evaluation Codes:   1) History comprised of:   Personal factors that impact the plan of care:      None.    Comorbidity factors that impact the plan of care are:      None.     Medications impacting care: None.  2) Examination of Body Systems comprised of:   Body structures and functions that impact the plan of care:      Hip and  Lumbar spine.   Activity limitations that impact the plan of care are:      Lifting.  3) Clinical presentation characteristics are:   Stable/Uncomplicated.  4) Decision-Making    Low complexity using standardized patient assessment instrument and/or measureable assessment of functional outcome.  Cumulative Therapy Evaluation is: Low complexity.    Previous and current functional limitations:  (See Goal Flow Sheet for this information)    Short term and Long term goals: (See Goal Flow Sheet for this information)     Communication ability:  Patient appears to be able to clearly communicate and understand verbal and written communication and follow directions correctly.  Treatment Explanation - The following has been discussed with the patient:   RX ordered/plan of care  Anticipated outcomes  Possible risks and side effects  This patient would benefit from PT intervention to resume normal activities.   Rehab potential is excellent.    Frequency:  1 X week, once daily  Duration:  for 4 weeks  Discharge Plan:  Achieve all LTG.  Independent in home treatment program.  Reach maximal therapeutic benefit.    Please refer to the daily flowsheet for treatment today, total treatment time and time spent performing 1:1 timed codes.

## 2017-08-28 NOTE — MR AVS SNAPSHOT
After Visit Summary   8/28/2017    Kate Isaac    MRN: [id number]           Patient Information     Date Of Birth          1935        Visit Information        Provider Department      8/28/2017 10:10 AM Arian Wynne PT [facility] Physical [facility]        Today's Diagnoses     Hip pain, right    -  1       Follow-ups after your visit        Your next 10 appointments already scheduled     Sep 11, 2017 10:10 AM CDT   KEVIN Extremity with Arian Wynne PT   [facility] ([facility])    [address]  [facility] [id number] [phone]              Who to contact     If you have questions or need follow up information about today's clinic visit or your schedule please contact [facility] PHYSICAL THERAPY directly at [phone].  Normal or non-critical lab and imaging results will be communicated to you by Magiqhart, letter or phone within 4 business days after the clinic has received the results. If you do not hear from us within 7 days, please contact the clinic through Magiqhart or phone. If you have a critical or abnormal lab result, we will notify you by phone as soon as possible.  Submit refill requests through Mesuro or call your pharmacy and they will forward the refill request to us. Please allow 3 business days for your refill to be completed.          Additional Information About Your Visit        MyChart Information     Mesuro gives you secure access to your electronic health record. If you see a primary care provider, you can also send messages to your care team and make appointments. If you have questions, please call your primary care clinic.  If you do not have a primary care provider, please call [phone] and they will assist you.        Care EveryWhere ID     This is your Care EveryWhere ID. This could be used by other  organizations to access your [facility] medical records  [id number]         Blood Pressure from Last 3 Encounters:   08/23/17 140/60   08/19/17 140/80   07/28/17 147/81    Weight from Last 3 Encounters:   08/23/17 62.1 kg (137 lb)   08/19/17 64.9 kg (143 lb)   06/05/17 64.9 kg (143 lb)              We Performed the Following     HC PT EVAL, LOW COMPLEXITY     KEVIN CERT REPORT     KEVIN INITIAL EVAL REPORT     THERAPEUTIC EXERCISES        Primary Care Provider Office Phone # Fax #    Lani Waters [phone] [address] [facility] [address]        Equal Access to Services     MARTA AGUILAR : Hadii chad escobaro Solitzy, waaxda luqadaha, qaybta kaalmada adeegyada, chrisit dailey. So [facility] [phone].    ATENCIÓN: Si habla español, tiene a macias disposición servicios gratuitos de asistencia lingüística. Llame al [phone].    We comply with applicable federal civil rights laws and [place] laws. We do not discriminate on the basis of race, color, national origin, age, disability sex, sexual orientation or gender identity.            Thank you!     Thank you for choosing [facility]  for your care. Our goal is always to provide you with excellent care. Hearing back from our patients is one way we can continue to improve our services. Please take a few minutes to complete the written survey that you may receive in the mail after your visit with us. Thank you!             Your Updated Medication List - Protect others around you: Learn how to safely use, store and throw away your medicines at www.disposemymeds.org.          This list is accurate as of: 8/28/17 11:16 AM.  Always use your most recent med list.                   Brand Name Dispense Instructions for use Diagnosis    amLODIPine 5 MG tablet    NORVASC    90 tablet    TAKE 1 TABLET EVERY DAY    Hypertension goal BP (blood pressure) < 140/90       aspirin  81 MG tablet      1 TABLET DAILY        calcium carb 1250 mg (500 mg [place])/vitamin D 200 units 500-200 MG-UNIT per tablet    OSCAL with D     Take 1 tablet by mouth 2 times daily (with meals)        FIBER PO      Take by mouth At Bedtime        FLAX PO           GLUCOSAMINE CHONDROITIN Tabs      1 twice daily        losartan 50 MG tablet    COZAAR    90 tablet    TAKE 1 TABLET EVERY DAY    Hypertension goal BP (blood pressure) < 140/90       * PRESERVISION AREDS 2 Caps           * CENTRUM SILVER ADULT 50+ Tabs      Take 1 capsule by mouth        TYLENOL 500 MG tablet   Generic drug:  acetaminophen      Take 2 tablets (1,000 mg) by mouth 2 times daily as needed for mild pain        * Notice:  This list has 2 medication(s) that are the same as other medications prescribed for you. Read the directions carefully, and ask your doctor or other care provider to review them with you.

## 2017-08-28 NOTE — PROGRESS NOTES
HPI                        System    Physical Exam                                         Musculoskeletal:        Legs:      ROS

## 2017-08-28 NOTE — PROGRESS NOTES
Patient did not return after this session.  Please refer to most recent progress note/evaluation report or SOAP note for discharge objective status.

## 2017-08-28 NOTE — LETTER
DEPARTMENT OF HEALTH AND HUMAN SERVICES  CENTERS FOR MEDICARE & MEDICAID SERVICES    PLAN/UPDATED PLAN OF PROGRESS FOR OUTPATIENT REHABILITATION    PATIENTS NAME:  Kate Isaac   : 1935  PROVIDER NUMBER:    [id number]  [id number]   PROVIDER NAME: [facility] PHYSICAL [facility]ERAP[facility]  MEDICAL RECORD NUMBER: [id number]   START OF CARE DATE:  SOC Date: 17   TYPE:  PT    PRIMARY/TREATMENT DIAGNOSIS: (Pertinent Medical Diagnosis)  Hip pain, right  VISITS FROM START OF CARE:  Rxs Used: 1     [place] [facility] Initial Evaluation  Subjective:  Patient is a 81 year old female presenting with rehab right hip hpi.   Kate Isaac is a 81 year old female with a right hip condition.  Condition occurred with:  Insidious onset.  Condition occurred: for unknown reasons.  This is a chronic and recurrent condition  Pt reports recurrent R hip pain over the last six months (2017).  Had PT in May 2017 for similar symptoms and it improved with lumbar extension in standing.  Reports that she backed off on that stretch as it made her back sore. The symptoms gradually increased again and was referred to Dr. Hummel at [facility] by her primary care MD (Aug 2017).  Given cortisone injection which has helped her pain significantly.  Currently reports that she gets slight achey pain in R buttock and occasional LBP.  .    Patient reports pain:  Posterior.  Radiates to:  No radiation.  Pain is described as aching and is intermittent and reported as 2/10.   Pain is the same all the time.  Symptoms are exacerbated by other (unsure (when more sore aggravated by sit to stand)) and relieved by heat and ice.  Since onset symptoms are rapidly improving.  Special tests:  X-ray (normal R hip).  Previous treatment includes other (cortisone injection).    General health as reported by patient is good.  Pertinent medical history includes:  Osteoporosis, cancer and high blood  pressure (osteopenia).  Medical allergies: yes (sulfa).  Other surgeries include:  Cancer surgery and other (breast cancer 1996, basal cell 2004, 2017).  Current medications:  High blood pressure medication and meds to increase bone density.  Current occupation is retired  Red flags:  None as reported by the patient.  Objective:  Standing Alignment:    Lumbar:  Lordosis decr  Flexibility/Screens:   Lower Extremity:  Normal    Lumbar/SI Evaluation  ROM:    AROM Lumbar:   Flexion:          Nil  Ext:                    Min loss   Side Bend:        Left:     Right:   Rotation:           Left:     Right:   Side Glide:        Left:  Nil    Right:  Nil  Kate Isaac page 2        Hip Evaluation  HIP AROM:  AROM:    Left Hip:     Normal    Right Hip:   Normal  Hip Strength:    Flexion:   Left: 4+/5   Pain:  Right: 4+/5   Pain:   Abduction:  Left: 4/5     Pain:Right: 4/5    Pain:  Knee Flexion:  Left: 5/5   Pain:Right: 5/5   Pain:  Knee Extension:  Left: 5/5   Pain:Right: 5/5    Pain:  Hip Special Testing:   Not Assessed  Hip Palpation:    Right hip tenderness present at:  Greater Trachanter and IT Band    General Evaluation:  Lower Extremity Flexibility:  normal  Assessment/Plan:    Patient is a 81 year old female with right side hip complaints.    Patient has the following significant findings with corresponding treatment plan.                Diagnosis 1:   R hip pain/trochanteric bursitis  Pain -  manual therapy, self management, education, directional preference exercise and home program  Decreased ROM/flexibility - manual therapy, therapeutic exercise, therapeutic activity and home program  Decreased strength - therapeutic exercise, therapeutic activities and home program  Inflammation - self management/home program  Decreased function - therapeutic activities and home program  Impaired posture - neuro re-education, therapeutic activities and home program  Therapy Evaluation Codes:   1) History comprised  of:   Personal factors that impact the plan of care:      None.    Comorbidity factors that impact the plan of care are:      None.     Medications impacting care: None.  2) Examination of Body Systems comprised of:   Body structures and functions that impact the plan of care:      Hip and Lumbar spine.   Activity limitations that impact the plan of care are:      Lifting.  3) Clinical presentation characteristics are:   Stable/Uncomplicated.  4) Decision-Making    Low complexity using standardized patient assessment instrument and/or measureable assessment of functional outcome.  Cumulative Therapy Evaluation is: Low complexity.  Previous and current functional limitations:  (See Goal Flow Sheet for this information)    Short term and Long term goals: (See Goal Flow Sheet for this information)   Communication ability:  Patient appears to be able to clearly communicate and understand verbal and written communication and follow directions correctly.  Treatment Explanation - The following has been discussed with the patient:   RX ordered/plan of care  Anticipated outcomes  Possible risks and side effects  Kate Isaac Page 3    This patient would benefit from PT intervention to resume normal activities.   Rehab potential is excellent.  Frequency:  1 X week, once daily  Duration:  for 4 weeks  Discharge Plan:  Achieve all LTG.  Independent in home treatment program.  Reach maximal therapeutic benefit.                                         Musculoskeletal:        Legs:      Caregiver Signature/Credentials _____________________________ Date ________       Treating Provider: Arian Wynne DPT   I have reviewed and certified the need for these services and plan of treatment while under my care.        PHYSICIAN'S SIGNATURE:   _________________________________________  Date___________   Casey Lucia    Certification period:  Beginning of Cert date period: 08/28/17 to  End of Cert period date: 11/25/17  "    Functional Level Progress Report: Please see attached \"Goal Flow sheet for Functional level.\"    ____X____ Continue Services or       ________ DC Services                Service dates: From  SOC Date: 08/28/17 date to present                         "

## 2017-09-01 DIAGNOSIS — I49.9 IRREGULAR HEARTBEAT: ICD-10-CM

## 2017-09-01 PROCEDURE — 0296T ZIO PATCH 48 HOURS: CPT | Performed by: FAMILY MEDICINE

## 2017-09-12 NOTE — PROGRESS NOTES
CARDIOLOGY NEW OFFICE VISIT    REFERRING MD: Dr. Pineda    CHIEF COMPLAINT: Irregular heart beat    HPI: Kate Isaac is a 81 year old female being seen today for evaluation of irregular heart beat.   The patient's risk factor profile is: (+) HTN [10 yrs], (-) DM, (-) hypercholesterolemia, (+)  prior 30 pack-year tobacco use [quit 25 years ago], (+) fam Hx premature CAD [maternal].  The patient has no history of cardiovascular disease (CAD, CHF, arrhythmia, valvular heart disease).  The patient has no Hx of PAD or cerebrovascular disease.  The patient has not undergone prior cardiovascular evaluation and has never had an ECHO, stress study, cardiac catheterization, or EP study.   The patient denies a history of chest discomfort, dyspnea, PND, orthopnea.  She has mild dependent pedal edema.  She denies palpitations, lightheadedness, and syncope.      She is in a good state of health and routinely donates blood to [facility].  About 6 weeks ago, she went to donate blood and while the RN was checking her pulse, she noted irregularity.  She followed up with Dr. Pineda (primary care) and had an ECG that showed NSR without ectopy and RSR' in V1.  No other abnormalities were noted.  She was placed on a Ziopatch for 48 hours.  HR .  She had 5 VT episodes, fastest at 240 bpm for 4 beats, longest 9 sec at 186 bpm.  7 SVT episodes, fastest 203 for 6 beats, longest 126 bpm for 10 sec. Isolated PACs 1.3%.  SVE Couplets 1.0%.  Isolated VEs 2.3%, VE couplets < 1.0%.    She exercises, both walking and swimming.  She does not experience cardiopulmonary symptoms with routine exercise.      PAST MEDICAL HISTORY:Past Medical History:   Diagnosis Date     Arthritis 02/01/2014    Per pt had it since FEB     Basal cell cancer     sees dermatologist     Breast cancer (H) 1996    invasive ductal     HTN      Osteopenia      Recurrent UTI        PAST SURGICAL HISTORY:  Past Surgical History:   Procedure Laterality Date     BIOPSY   1996 2004,2010    basal cell cancers     BUNIONECTOMY RT/LT       [facility] AFF SURGICAL PATHOLOGY      ganglion cyst removal     COLONOSCOPY       GENITOURINARY SURGERY       HC EXCISION BREAST LESION, OPEN >=1  1996    right breast     SLING TRANSVAGINAL  12/9/2013    Procedure: SLING TRANSVAGINAL;  Cysto with TVT;  Surgeon: Denia Shultz MD;  Location: [facility] OR     SURGICAL HISTORY OF -   1959    right thigh tumor removal/benign       FAMILY HX:  Family History   Problem Relation Age of Onset     HEART DISEASE Mother      cad at age 70s     CANCER Mother      KIDNEY     DIABETES Mother      Coronary Artery Disease Mother      Hypertension Mother      HEART DISEASE Father      chf     Neurologic Disorder Father      PARKINSONS     Alzheimer Disease Father      HEART DISEASE Maternal Grandmother      chf     Coronary Artery Disease Maternal Grandmother      CANCER Maternal Grandfather      ?     HEART DISEASE Paternal Grandmother      Coronary Artery Disease Paternal Grandmother      HEART DISEASE Paternal Grandfather      Neurologic Disorder Paternal Grandfather      PARKINSONS     Blood Disease Brother      LEUKEMIA     Breast Cancer Other      DIABETES Other      Mother's sister's daughter     CANCER Daughter      CERVICAL     CANCER Other      THYROID     CEREBROVASCULAR DISEASE Other      Breast Cancer Daughter        SOCIAL HX:  Social History     Social History     Marital status:      Spouse name: N/A     Number of children: N/A     Years of education: N/A     Social History Main Topics     Smoking status: Former Smoker     Packs/day: 1.00     Years: 10.00     Types: Cigarettes     Start date: 1/1/1956     Quit date: 1/1/1985     Smokeless tobacco: Never Used      Comment: I stopped smoking several times from 1959 to 1985     Alcohol use 1.5 - 2.0 oz/week      Comment: Red wine     Drug use: No     Sexual activity: Yes     Partners: Male     Birth control/ protection: Post-menopausal, Female Surgical       Comment: Tubal in 1965 & now I am post-menopausal     Other Topics Concern      Service No     Blood Transfusions No     Caffeine Concern No     Occupational Exposure No     Hobby Hazards No     Sleep Concern No     Stress Concern No     Weight Concern No     Special Diet No     Back Care No     Exercise No     Bike Helmet No     Seat Belt No     Self-Exams No     Parent/Sibling W/ Cabg, Mi Or Angioplasty Before 65f 55m? No     Social History Narrative       CURRENT MEDICATIONS:  Current Outpatient Prescriptions   Medication Sig Dispense Refill     losartan (COZAAR) 50 MG tablet TAKE 1 TABLET EVERY DAY (Patient taking differently: TAKE 1 TABLET EVERY DAY IN AM) 90 tablet 1     amLODIPine (NORVASC) 5 MG tablet TAKE 1 TABLET EVERY DAY (Patient taking differently: TAKE 1 TABLET EVERY DAY EVENING) 90 tablet 3     acetaminophen (TYLENOL) 500 MG tablet Take 2 tablets (1,000 mg) by mouth 2 times daily as needed for mild pain       calcium carb 1250 mg, 500 mg [place],/vitamin D 200 units (OSCAL WITH D) 500-200 MG-UNIT per tablet Take 1 tablet by mouth 2 times daily (with meals)       FIBER PO Take 2 capsules by mouth At Bedtime        Multiple Vitamins-Minerals (PRESERVISION AREDS 2) CAPS One in AM and one in PM       Multiple Vitamins-Minerals (CENTRUM SILVER ADULT 50+) TABS Take 1 capsule by mouth        Flaxseed, Linseed, (FLAX PO)        ASPIRIN 81 MG OR TABS 1 TABLET DAILY PM       GLUCOSAMINE CHONDROITIN OR TABS 1 twice daily         ALLERGIES  Ceftriaxone; Sulfa drugs; and Lisinopril    ROS:  Constitutional: No fever, chills, or sweats. No weight gain/loss.   ENT: No visual disturbance, ear ache, epistaxis, sore throat.   Allergies/Immunologic: Negative.   Respiratory: No cough, hemoptysis.   Cardiovascular: As per HPI.   GI: No nausea, vomiting, hematemesis, melena, or hematochezia.   : No urinary frequency, dysuria, or hematuria.   Integument: Negative.   Psychiatric: Negative.   Neuro: Negative.    Endocrinology: Negative.   Musculoskeletal: No myalgia.        VITAL SIGNS:  /84 (BP Location: Left arm, Patient Position: Chair, Cuff Size: Adult Regular)  Pulse 86  Wt 62.5 kg (137 lb 12.8 oz)  SpO2 99%  BMI 24.03 kg/m2  Body mass index is 24.03 kg/(m^2).  Wt Readings from Last 2 Encounters:   17 62.1 kg (137 lb)   17 64.9 kg (143 lb)       PHYSICAL EXAM  Kate Isaac IS A 81 year old female.in no acute distress.  HEENT: Unremarkable.  Neck: JVP normal.  Carotids +4/4 bilaterally without bruits.  Lungs: CTA.  Cor: RRR with frequent ectopy. Normal S1 and S2.  No murmur, rub, or gallop.  PMI in Lf 5th ICS.  Abd: Soft, nontender, nondistended.  NABS.  No pulsatile mass.  Extremities: No C/C.  Rt forearm edema (Hx RT sided breast cancer).  No pedal edema.  Pulses +4/4 symmetric in upper and lower extremities.  Neuro: Grossly intact.    LABS    Lab Results   Component Value Date    WBC 6.0 10/07/2016     Lab Results   Component Value Date    RBC 4.59 10/07/2016     Lab Results   Component Value Date    HGB 13.1 10/10/2016     Lab Results   Component Value Date    HCT 40.1 10/07/2016     No components found for: MCT  Lab Results   Component Value Date    MCV 87 10/07/2016     Lab Results   Component Value Date    MCH 27.9 10/07/2016     Lab Results   Component Value Date    MCHC 31.9 10/07/2016     Lab Results   Component Value Date    RDW 16.2 10/07/2016     Lab Results   Component Value Date     10/07/2016      Recent Labs   Lab Test  17   0738  16   0736   NA  139  141   POTASSIUM  4.3  4.5   CHLORIDE  103  104   CO2  29  29   ANIONGAP  7  8   GLC  97  93   BUN  11  18   CR  0.57  0.55   SASHA  9.1  9.4     Recent Labs   Lab Test  17   0738  16   0901  14   1033  14   0957   CHOL  202*  195  205*  173   HDL  91  89  96  56   LDL  98  97  94  97   TRIG  63  45  76  101   CHOLHDLRATIO   --    --   2.1  3.1        EK2017  Sinus  Rhythm  "  -RSR(V1) -nondiagnostic.     ECHO: none    STRESS TEST:  none    CARDIAC CATH:  none    ZIOPATCH:  08/23/2017        CAROTID US:  07/13/2001  IMPRESSION:  Slight velocity increase in proximal left common carotid artery which is probably not hemodynamically significant.  The internal carotid artery shows no significant stenosis.        ASSESSMENT/PLAN:   1. Supraventriclar & Ventricular Ectopy, Asymptomatic.  The patient is completely asymptomatic with the arrhythmia.  Her cardiac exam is only remarkable for the irregularity in rhythm and she was unaware of this during the exam.  Her ECG shows NSR with PVCs.  The Ziopatch showed nonsustained, brief SVT and VT.  I would like to get an ECHO to rule out structural heart disease.  In the absence of symptoms, I do not believe stress testing would have a low yield.  I would not recommend beta blocker at this point nor would I recommend anticoagulation (in the absence of AF).  I would not recommend antiarrhythmic therapy.    2. HTN.  Continue Losartan 50 qd and Norvasc 5 qd.  Suspect \"white coat\" as component.  Check BP in ambulatory setting and report back in one week.    ECHO  (9/23/17):   Global and regional left ventricular function is normal with an EF of 60-65%.  Right ventricular function, chamber size, wall motion, and thickness are normal.  The inferior vena cava is normal.  No pericardial effusion is present.    Juan Antonio Palmer MD    Divisions of Cardiology  [place], [place]    "

## 2017-09-15 ENCOUNTER — THERAPY VISIT (OUTPATIENT)
Dept: PHYSICAL THERAPY | Facility: CLINIC | Age: 82
End: 2017-09-15
Payer: MEDICARE

## 2017-09-15 DIAGNOSIS — M25.551 HIP PAIN, RIGHT: ICD-10-CM

## 2017-09-15 PROCEDURE — 97140 MANUAL THERAPY 1/> REGIONS: CPT | Mod: GP | Performed by: PHYSICAL THERAPIST

## 2017-09-15 PROCEDURE — 97110 THERAPEUTIC EXERCISES: CPT | Mod: GP | Performed by: PHYSICAL THERAPIST

## 2017-09-15 NOTE — PROGRESS NOTES
Subjective:    HPI                    Objective:    System                                           Hip Evaluation    Hip Strength:        Abduction:  Left: 5/5     Pain:Right: 5-/5    Pain:                                   Rosalind Lumbar Evaluation      Movement Loss:  Flexion (Flex): nil  Extension (EXT): min  Side Glide R (SG R): nil  Side Charlotte L (SG L): nil                                               ROS    Assessment/Plan:      SUBJECTIVE  Subjective changes as noted by pt:  Performing her strengthening exercises every other day since first session two weeks ago. Performing the EIS every other day as well.  Been feeling pretty good.  Sometimes gets some mild pain across her back.     Current pain level: 1/10     Changes in function:  None     Adverse reaction to treatment or activity:  None    OBJECTIVE  Changes in objective findings:  Yes, See physical exam section and/or daily flowsheet for response to repeated movements.           ASSESSMENT  Kate continues to require intervention to meet STG and LTG's: PT  Patient is progressing as expected.  Response to therapy has shown an improvement in  pain level  Progress made towards STG/LTG?  None    PLAN  Continue current treatment plan until patient demonstrates readiness to progress to higher level exercises.    PTA/ATC plan:  N/A    Please refer to the daily flowsheet for treatment today, total treatment time and time spent performing 1:1 timed codes.

## 2017-09-15 NOTE — MR AVS SNAPSHOT
After Visit Summary   9/15/2017    Kate Isaac    MRN: [id number]           Patient Information     Date Of Birth          1935        Visit Information        Provider Department      9/15/2017 3:10 PM Arian Wynne, PT [facility] Physical Therapy        Today's Diagnoses     Hip pain, right           Follow-ups after your visit        Your next 10 appointments already scheduled     Sep 18, 2017  3:10 PM CDT   New Visit with Juan Antonio Palmer MD   [facility]    [address] [place] [id number] [phone]              Who to contact     If you have questions or need follow up information about today's clinic visit or your schedule please contact [facility]AP[facility] directly at [phone].  Normal or non-critical lab and imaging results will be communicated to you by MyChart, letter or phone within 4 business days after the clinic has received the results. If you do not hear from us within 7 days, please contact the clinic through [employer] or phone. If you have a critical or abnormal lab result, we will notify you by phone as soon as possible.  Submit refill requests through [employer] or call your pharmacy and they will forward the refill request to us. Please allow 3 business days for your refill to be completed.          Additional Information About Your Visit        MyChart Information     [employer] gives you secure access to your electronic health record. If you see a primary care provider, you can also send messages to your care team and make appointments. If you have questions, please call your primary care clinic.  If you do not have a primary care provider, please call [phone] and they will assist you.        Care EveryWhere ID     This is your Care EveryWhere ID. This could be used by other organizations to access your [facility]  records  [id number]         Blood Pressure from Last 3 Encounters:   08/23/17 140/60   08/19/17 140/80   07/28/17 147/81    Weight from Last 3 Encounters:   08/23/17 62.1 kg (137 lb)   08/19/17 64.9 kg (143 lb)   06/05/17 64.9 kg (143 lb)              We Performed the Following     MANUAL THER TECH,1+REGIONS,EA 15 MIN     THERAPEUTIC EXERCISES          Today's Medication Changes          These changes are accurate as of: 9/15/17  3:51 PM.  If you have any questions, ask your nurse or doctor.               These medicines have changed or have updated prescriptions.        Dose/Directions    amLODIPine 5 MG tablet   Commonly known as:  NORVASC   This may have changed:  additional instructions   Used for:  Hypertension goal BP (blood pressure) < 140/90        TAKE 1 TABLET EVERY DAY   Quantity:  90 tablet   Refills:  3       losartan 50 MG tablet   Commonly known as:  COZAAR   This may have changed:  additional instructions   Used for:  Hypertension goal BP (blood pressure) < 140/90        TAKE 1 TABLET EVERY DAY   Quantity:  90 tablet   Refills:  1                Primary Care Provider Office Phone # Fax #    Lani Waters [phone] [address] [facility] [address]        Equal Access to Services     MARTA AGUILAR AH: Hadii chad ford hadasho Soomaali, waaxda luqadaha, qaybta kaalmada adeegyada, christi dailey. So [facility] [phone].    ATENCIÓN: Si habla español, tiene a macias disposición servicios gratuitos de asistencia lingüística. Llame al [phone].    We comply with applicable federal civil rights laws and [place] laws. We do not discriminate on the basis of race, color, national origin, age, disability sex, sexual orientation or gender identity.            Thank you!     Thank you for choosing [facility]  for your care. Our goal is always to provide you with excellent care. Hearing back from our patients  is one way we can continue to improve our services. Please take a few minutes to complete the written survey that you may receive in the mail after your visit with us. Thank you!             Your Updated Medication List - Protect others around you: Learn how to safely use, store and throw away your medicines at www.disposemymeds.org.          This list is accurate as of: 9/15/17  3:51 PM.  Always use your most recent med list.                   Brand Name Dispense Instructions for use Diagnosis    amLODIPine 5 MG tablet    NORVASC    90 tablet    TAKE 1 TABLET EVERY DAY    Hypertension goal BP (blood pressure) < 140/90       aspirin 81 MG tablet      1 TABLET DAILY PM        calcium carb 1250 mg (500 mg [place])/vitamin D 200 units 500-200 MG-UNIT per tablet    OSCAL with D     Take 1 tablet by mouth 2 times daily (with meals)        FIBER PO      Take 2 capsules by mouth At Bedtime        FLAX PO           GLUCOSAMINE CHONDROITIN Tabs      1 twice daily        losartan 50 MG tablet    COZAAR    90 tablet    TAKE 1 TABLET EVERY DAY    Hypertension goal BP (blood pressure) < 140/90       * PRESERVISION AREDS 2 Caps      One in AM and one in PM        * CENTRUM SILVER ADULT 50+ Tabs      Take 1 capsule by mouth        TYLENOL 500 MG tablet   Generic drug:  acetaminophen      Take 2 tablets (1,000 mg) by mouth 2 times daily as needed for mild pain        * Notice:  This list has 2 medication(s) that are the same as other medications prescribed for you. Read the directions carefully, and ask your doctor or other care provider to review them with you.

## 2017-09-18 ENCOUNTER — OFFICE VISIT (OUTPATIENT)
Dept: CARDIOLOGY | Facility: CLINIC | Age: 82
End: 2017-09-18
Attending: FAMILY MEDICINE
Payer: COMMERCIAL

## 2017-09-18 VITALS
BODY MASS INDEX: 24.03 KG/M2 | HEART RATE: 86 BPM | DIASTOLIC BLOOD PRESSURE: 73 MMHG | SYSTOLIC BLOOD PRESSURE: 165 MMHG | OXYGEN SATURATION: 99 % | WEIGHT: 137.8 LBS

## 2017-09-18 DIAGNOSIS — I47.29 NONSUSTAINED VENTRICULAR TACHYCARDIA (H): Primary | ICD-10-CM

## 2017-09-18 PROCEDURE — 99203 OFFICE O/P NEW LOW 30 MIN: CPT | Performed by: INTERNAL MEDICINE

## 2017-09-18 ASSESSMENT — PAIN SCALES - GENERAL: PAINLEVEL: NO PAIN (0)

## 2017-09-18 NOTE — PATIENT INSTRUCTIONS
It was a pleasure to see you in the cardiology clinic today.    If you have any questions, you can reach my nurse, Massiel Lopes, at [phone].     Note the new medications: None  Stop the following medications: None    The results from today include: None    Tests ordered today: ECHO    I would like you to follow up with PCP as needed (assuming ECHO normal).    Sincerely,      Juan Antonio Palmer MD     [facility]          Chest Echocardiography (Transthoracic)     During an echo, images of your heart appear on a monitor.   An echocardiogram (echo) is an imaging test.  A transthoracic echocardiogram is sometimes called by its abbreviation TTE. It may also be called surface echocardiogram because the images are non-invasive taken from the surface of the chest wall. It helps your healthcare provider evaluate your heart. A more invasive type of echocardiogram involves the ultrasound probe being passed into the esophagus to get images (transesophageal).     This test:    Is safe and generally painless. Some people have discomfort from the echo probe being pressed against the bony areas of the chest. This is relieves once the probe is moved.    Can be done in a hospital, test center, or doctor s office    Bounces harmless sound waves (ultrasound) off the heart using a transducer or probe (device that looks like a microphone)    Allows your healthcare provider evaluate the size and shape of your heart, and the size, thickness and movement of your heart's walls, and the heart's pumping strength.    Shows if the heart valves are working correctly, if blood is leaking backwards through your heart valves (regurgitation), or if the heart valves are to onarrow (stenosis)    Shows if there is a tumor or infectious growth around your heart valves    Will help your healthcare provider find out if there are problems with the outer lining of your heart (pericardium)    Shows problems with the  large blood vessels that enter and leave the heart    Demonstrates blood clots in the heart chambers    Shows abnormal holes between heart chambers  Before your echo    Discuss any questions or concerns you have with your healthcare provider.    Mention any over-the-counter or prescription medicines, herbs, or supplements you re taking.    Allow extra time for checking in. Bring your insurance cards, identification, and any co-payments that are required for the test.    Wear a 2-piece outfit for the test. You may be asked to remove clothing and jewelry from the waist up. If so, you ll be given a short hospital gown.    An intravenous catheter may be inserted into a vein in your arm or hand. Contrast or bubbles will be injected during the study.  During your echo    Small pads (electrodes) are placed on your chest to monitor your heartbeat.    A transducer coated with cool gel is moved firmly over your chest. This device creates the sound waves that make images of your heart. If you are overweight, the technician may have to apply more pressure to the chest wall to improve the quality of the images. This pressure can be uncomfortable over bony areas. Tell your technician if you are uncomfortable.    At times, you may be asked to exhale and hold your breath for a few seconds. Air in your lungs can affect the images.    The transducer may also be used to do a Doppler study. This test measures the direction and speed of blood flowing through the heart. During the test, you may hear a  whooshing  sound. This is the sound of blood flowing through the heart.    The technician may use IV contrast to improve the image quality or agitated saline may be used to follow blood flow through the chambers of the heart.    The images of your heart are stored electronically. This is so your healthcare provider can review them later.  After your echo    Return to normal activity unless your healthcare provider tells you  otherwise.    Be sure to keep follow-up appointments.  Your test results  Your healthcare provider will discuss your test results with you during a future office visit. The test results help the healthcare provider plan your treatment and any other tests that are needed.  Date Last Reviewed: 12/1/2016 2000-2017 [employer]. [address] [place] [address], [place] [address]. All rights reserved. This information is not intended as a substitute for professional medical care. Always follow your healthcare professional's instructions.

## 2017-09-18 NOTE — MR AVS SNAPSHOT
After Visit Summary   9/18/2017    Kate Isaac    MRN: [id number]           Patient Information     Date Of Birth          1935        Visit Information        Provider Department      9/18/2017 3:10 PM Juan Antonio Palemr MD [facility]        Today's Diagnoses     Nonsustained ventricular tachycardia (H)    -  1      Care Instructions    It was a pleasure to see you in the cardiology clinic today.    If you have any questions, you can reach my nurse, Massiel Lopes, at [phone].     Note the new medications: None  Stop the following medications: None    The results from today include: None    Tests ordered today: ECHO    I would like you to follow up with PCP as needed (assuming ECHO normal).    Sincerely,      Juan Antonio Palmer MD     [facility]          Chest Echocardiography (Transthoracic)     During an echo, images of your heart appear on a monitor.   An echocardiogram (echo) is an imaging test.  A transthoracic echocardiogram is sometimes called by its abbreviation TTE. It may also be called surface echocardiogram because the images are non-invasive taken from the surface of the chest wall. It helps your healthcare provider evaluate your heart. A more invasive type of echocardiogram involves the ultrasound probe being passed into the esophagus to get images (transesophageal).     This test:    Is safe and generally painless. Some people have discomfort from the echo probe being pressed against the bony areas of the chest. This is relieves once the probe is moved.    Can be done in a hospital, test center, or doctor s office    Bounces harmless sound waves (ultrasound) off the heart using a transducer or probe (device that looks like a microphone)    Allows your healthcare provider evaluate the size and shape of your heart, and the size, thickness and movement of your heart's walls, and the heart's pumping strength.    Shows if  [facility]  [address] [place]  [address]  [phone]        May 8, 2017    Sameer Gastelum  [address] [facility] [address]        Dear Sameer,    The results of your recent labs are enclosed.   Your blood work showed past exposure to Ebstein Barr Virus (which we knew from your history of Mono) but does not show chronic infection.     Please call the clinic if you have any concerns.     Results for orders placed or performed in visit on 05/04/17   EBV Capsid Antibody IgM   Result Value Ref Range    EBV Capsid Antibody IgM  0.0 - 0.8 AI     <0.2  No detectable antibody.   Antibody index (AI) values reflect qualitative changes in antibody   concentration that cannot be directly associated with clinical condition or   disease state.     EBV Nuclear Antigen EBNA Antibody IgG   Result Value Ref Range    EBV Nuclear Antigen (EBNA) Antibody IgG (H) 0.0 - 0.8 AI     >8.0  Positive, suggests convalescent phase or past exposure   Antibody index (AI) values reflect qualitative changes in antibody   concentration that cannot be directly associated with clinical condition or   disease state.     EBV Capsid Antibody IgG   Result Value Ref Range    EBV Capsid Antibody IgG (H) 0.0 - 0.8 AI     >8.0  Positive, suggests recent or past exposure   Antibody index (AI) values reflect qualitative changes in antibody   concentration that cannot be directly associated with clinical condition or   disease state.         If you have any questions please call the clinic at [phone].    Sincerely,    Dhara EVANS CNP, LMD       the heart valves are working correctly, if blood is leaking backwards through your heart valves (regurgitation), or if the heart valves are to onarrow (stenosis)    Shows if there is a tumor or infectious growth around your heart valves    Will help your healthcare provider find out if there are problems with the outer lining of your heart (pericardium)    Shows problems with the large blood vessels that enter and leave the heart    Demonstrates blood clots in the heart chambers    Shows abnormal holes between heart chambers  Before your echo    Discuss any questions or concerns you have with your healthcare provider.    Mention any over-the-counter or prescription medicines, herbs, or supplements you re taking.    Allow extra time for checking in. Bring your insurance cards, identification, and any co-payments that are required for the test.    Wear a 2-piece outfit for the test. You may be asked to remove clothing and jewelry from the waist up. If so, you ll be given a short hospital gown.    An intravenous catheter may be inserted into a vein in your arm or hand. Contrast or bubbles will be injected during the study.  During your echo    Small pads (electrodes) are placed on your chest to monitor your heartbeat.    A transducer coated with cool gel is moved firmly over your chest. This device creates the sound waves that make images of your heart. If you are overweight, the technician may have to apply more pressure to the chest wall to improve the quality of the images. This pressure can be uncomfortable over bony areas. Tell your technician if you are uncomfortable.    At times, you may be asked to exhale and hold your breath for a few seconds. Air in your lungs can affect the images.    The transducer may also be used to do a Doppler study. This test measures the direction and speed of blood flowing through the heart. During the test, you may hear a  whooshing  sound. This is the sound of blood flowing through  the heart.    The technician may use IV contrast to improve the image quality or agitated saline may be used to follow blood flow through the chambers of the heart.    The images of your heart are stored electronically. This is so your healthcare provider can review them later.  After your echo    Return to normal activity unless your healthcare provider tells you otherwise.    Be sure to keep follow-up appointments.  Your test results  Your healthcare provider will discuss your test results with you during a future office visit. The test results help the healthcare provider plan your treatment and any other tests that are needed.  Date Last Reviewed: 12/1/2016 2000-2017 [employer]. [address] [place], [place] [address]. All rights reserved. This information is not intended as a substitute for professional medical care. Always follow your healthcare professional's instructions.                Follow-ups after your visit        Your next 10 appointments already scheduled     Sep 22, 2017 11:30 AM CDT   Ech Complete with MGECHCANDELARIA, MG ECHO TECH   [facility]    [address] [place] [address] [id number] [phone]           1. Please bring or wear a comfortable two-piece outfit. 2. You may eat, drink and take your normal medicines. 3. For any questions that cannot be answered, please contact the ordering physician            Sep 26, 2017 10:30 AM CDT   [facility] with Arian Wynne, PT   [facility] ([facility])    [address] [place] [address]. [address]  [facility] [id number] [phone]              Future tests that were ordered for you today     Open Future Orders        Priority Expected Expires Ordered    Echocardiogram Routine 8/31/2018 9/18/2018 9/18/2017            Who to contact     If you have questions or need follow up information about today's clinic visit or your schedule  please contact [facility] directly at [phone].  Normal or non-critical lab and imaging results will be communicated to you by [employer], letter or phone within 4 business days after the clinic has received the results. If you do not hear from us within 7 days, please contact the clinic through [employer] or phone. If you have a critical or abnormal lab result, we will notify you by phone as soon as possible.  Submit refill requests through [employer] or call your pharmacy and they will forward the refill request to us. Please allow 3 business days for your refill to be completed.          Additional Information About Your Visit        [employer] Information     [employer] gives you secure access to your electronic health record. If you see a primary care provider, you can also send messages to your care team and make appointments. If you have questions, please call your primary care clinic.  If you do not have a primary care provider, please call [phone] and they will assist you.      [employer] is an electronic gateway that provides easy, online access to your medical records. With [employer], you can request a clinic appointment, read your test results, renew a prescription or communicate with your care team.     To access your existing account, please contact your [facility] or call [phone] for assistance.        Care EveryWhere ID     This is your Care EveryWhere ID. This could be used by other organizations to access your [place] medical records  [id number]        Your Vitals Were     Pulse Pulse Oximetry BMI (Body Mass Index)             86 99% 24.03 kg/m2          Blood Pressure from Last 3 Encounters:   09/18/17 165/73   08/23/17 140/60   08/19/17 140/80    Weight from Last 3 Encounters:   09/18/17 62.5 kg (137 lb 12.8 oz)   08/23/17 62.1 kg (137 lb)   08/19/17 64.9 kg (143 lb)                 Today's Medication Changes          These changes are accurate as of:  9/18/17  4:15 PM.  If you have any questions, ask your nurse or doctor.               These medicines have changed or have updated prescriptions.        Dose/Directions    amLODIPine 5 MG tablet   Commonly known as:  NORVASC   This may have changed:  additional instructions   Used for:  Hypertension goal BP (blood pressure) < 140/90        TAKE 1 TABLET EVERY DAY   Quantity:  90 tablet   Refills:  3       losartan 50 MG tablet   Commonly known as:  COZAAR   This may have changed:  additional instructions   Used for:  Hypertension goal BP (blood pressure) < 140/90        TAKE 1 TABLET EVERY DAY   Quantity:  90 tablet   Refills:  1                Primary Care Provider Office Phone # Fax #    Lani Waters [phone] [address] [facility] [address]        Equal Access to Services     MARTA AGUILAR : Hadii chad ford hadasho Soomaali, waaxda luqadaha, qaybta kaalmada adeegyada, waxay jovanyin haysixto dailey. So [facility] [phone].    ATENCIÓN: Si habla español, tiene a macias disposición servicios gratuitos de asistencia lingüística. [facility] [phone].    We comply with applicable federal civil rights laws and [place] laws. We do not discriminate on the basis of race, color, national origin, age, disability sex, sexual orientation or gender identity.            Thank you!     Thank you for choosing [facility]  for your care. Our goal is always to provide you with excellent care. Hearing back from our patients is one way we can continue to improve our services. Please take a few minutes to complete the written survey that you may receive in the mail after your visit with us. Thank you!             Your Updated Medication List - Protect others around you: Learn how to safely use, store and throw away your medicines at www.disposemymeds.org.          This list is accurate as of: 9/18/17  4:15 PM.  Always use your most recent med list.                   Brand Name  Dispense Instructions for use Diagnosis    amLODIPine 5 MG tablet    NORVASC    90 tablet    TAKE 1 TABLET EVERY DAY    Hypertension goal BP (blood pressure) < 140/90       aspirin 81 MG tablet      1 TABLET DAILY PM        calcium carb 1250 mg (500 mg [place])/vitamin D 200 units 500-200 MG-UNIT per tablet    OSCAL with D     Take 1 tablet by mouth 2 times daily (with meals)        FIBER PO      Take 2 capsules by mouth At Bedtime        FLAX PO           GLUCOSAMINE CHONDROITIN Tabs      1 twice daily        losartan 50 MG tablet    COZAAR    90 tablet    TAKE 1 TABLET EVERY DAY    Hypertension goal BP (blood pressure) < 140/90       * PRESERVISION AREDS 2 Caps      One in AM and one in PM        * CENTRUM SILVER ADULT 50+ Tabs      Take 1 capsule by mouth        TYLENOL 500 MG tablet   Generic drug:  acetaminophen      Take 2 tablets (1,000 mg) by mouth 2 times daily as needed for mild pain        * Notice:  This list has 2 medication(s) that are the same as other medications prescribed for you. Read the directions carefully, and ask your doctor or other care provider to review them with you.

## 2017-09-18 NOTE — NURSING NOTE
"Kate Isaac's goals for this visit include:   Chief Complaint   Patient presents with     Consult     Irregular heart beat       She requests these members of her care team be copied on today's visit information: PCP    PCP: Lani Waters    Referring Provider:  Ashley Pineda [place]  [address] [place] [address]    Chief Complaint   Patient presents with     Consult     Irregular heart beat       Initial /84 (BP Location: Left arm, Patient Position: Chair, Cuff Size: Adult Regular)  Pulse 86  Wt 62.5 kg (137 lb 12.8 oz)  SpO2 99%  BMI 24.03 kg/m2 Estimated body mass index is 24.03 kg/(m^2) as calculated from the following:    Height as of 8/19/17: 1.613 m (5' 3.5\").    Weight as of this encounter: 62.5 kg (137 lb 12.8 oz).  Medication Reconciliation: complete         Medication Refills: none        Shaniqua Laird CMA        "

## 2017-09-22 ENCOUNTER — RADIANT APPOINTMENT (OUTPATIENT)
Dept: CARDIOLOGY | Facility: CLINIC | Age: 82
End: 2017-09-22
Attending: INTERNAL MEDICINE
Payer: COMMERCIAL

## 2017-09-22 DIAGNOSIS — I47.29 NONSUSTAINED VENTRICULAR TACHYCARDIA (H): ICD-10-CM

## 2017-09-22 PROCEDURE — 93306 TTE W/DOPPLER COMPLETE: CPT

## 2017-09-26 ENCOUNTER — THERAPY VISIT (OUTPATIENT)
Dept: PHYSICAL THERAPY | Facility: CLINIC | Age: 82
End: 2017-09-26
Payer: MEDICARE

## 2017-09-26 DIAGNOSIS — M25.551 HIP PAIN, RIGHT: ICD-10-CM

## 2017-09-26 PROCEDURE — 97140 MANUAL THERAPY 1/> REGIONS: CPT | Mod: GP | Performed by: PHYSICAL THERAPIST

## 2017-09-26 PROCEDURE — 97110 THERAPEUTIC EXERCISES: CPT | Mod: GP | Performed by: PHYSICAL THERAPIST

## 2017-09-26 NOTE — MR AVS SNAPSHOT
After Visit Summary   9/26/2017    Kate Isaac    MRN: [id number]           Patient Information     Date Of Birth          1935        Visit Information        Provider Department      9/26/2017 10:30 AM Arian Wynne PT [facility]        Today's Diagnoses     Hip pain, right           Follow-ups after your visit        Your next 10 appointments already scheduled     Oct 02, 2017  3:40 PM CDT   KEVIN Extremity with Arian Wynne PT   [facility] Therapy (Health system)    [address]  [facility] [id number] [phone]              Who to contact     If you have questions or need follow up information about today's clinic visit or your schedule please contact [facility] directly at [phone].  Normal or non-critical lab and imaging results will be communicated to you by [employer], letter or phone within 4 business days after the clinic has received the results. If you do not hear from us within 7 days, please contact the clinic through [employer] or phone. If you have a critical or abnormal lab result, we will notify you by phone as soon as possible.  Submit refill requests through [employer] or call your pharmacy and they will forward the refill request to us. Please allow 3 business days for your refill to be completed.          Additional Information About Your Visit        MyChart Information     [employer] gives you secure access to your electronic health record. If you see a primary care provider, you can also send messages to your care team and make appointments. If you have questions, please call your primary care clinic.  If you do not have a primary care provider, please call [phone] and they will assist you.        Care EveryWhere ID     This is your Care EveryWhere ID. This could be used by other organizations  to access your [place] medical records  [id number]         Blood Pressure from Last 3 Encounters:   09/18/17 165/73   08/23/17 140/60   08/19/17 140/80    Weight from Last 3 Encounters:   09/18/17 62.5 kg (137 lb 12.8 oz)   08/23/17 62.1 kg (137 lb)   08/19/17 64.9 kg (143 lb)              We Performed the Following     MANUAL THER TECH,1+REGIONS,EA 15 MIN     THERAPEUTIC EXERCISES          Today's Medication Changes          These changes are accurate as of: 9/26/17 11:13 AM.  If you have any questions, ask your nurse or doctor.               These medicines have changed or have updated prescriptions.        Dose/Directions    amLODIPine 5 MG tablet   Commonly known as:  NORVASC   This may have changed:  additional instructions   Used for:  Hypertension goal BP (blood pressure) < 140/90        TAKE 1 TABLET EVERY DAY   Quantity:  90 tablet   Refills:  3       losartan 50 MG tablet   Commonly known as:  COZAAR   This may have changed:  additional instructions   Used for:  Hypertension goal BP (blood pressure) < 140/90        TAKE 1 TABLET EVERY DAY   Quantity:  90 tablet   Refills:  1                Primary Care Provider Office Phone # Fax #    Lani Waters [phone] [address] [facility] [address]        Equal Access to Services     MARTA AGUILAR : Hadii chad ford hadasho Soomaali, waaxda luqadaha, qaybta kaalmada adeegyada, christi dailey. So [facility] [phone].    ATENCIÓN: Si habla español, tiene a macias disposición servicios gratuitos de asistencia lingüística. Llame al [phone].    We comply with applicable federal civil rights laws and [place] laws. We do not discriminate on the basis of race, color, national origin, age, disability sex, sexual orientation or gender identity.            Thank you!     Thank you for choosing [facility]  for your care. Our goal is always to provide you with  excellent care. Hearing back from our patients is one way we can continue to improve our services. Please take a few minutes to complete the written survey that you may receive in the mail after your visit with us. Thank you!             Your Updated Medication List - Protect others around you: Learn how to safely use, store and throw away your medicines at www.disposemymeds.org.          This list is accurate as of: 9/26/17 11:13 AM.  Always use your most recent med list.                   Brand Name Dispense Instructions for use Diagnosis    amLODIPine 5 MG tablet    NORVASC    90 tablet    TAKE 1 TABLET EVERY DAY    Hypertension goal BP (blood pressure) < 140/90       aspirin 81 MG tablet      1 TABLET DAILY PM        calcium carb 1250 mg (500 mg [place])/vitamin D 200 units 500-200 MG-UNIT per tablet    OSCAL with D     Take 1 tablet by mouth 2 times daily (with meals)        FIBER PO      Take 2 capsules by mouth At Bedtime        FLAX PO           GLUCOSAMINE CHONDROITIN Tabs      1 twice daily        losartan 50 MG tablet    COZAAR    90 tablet    TAKE 1 TABLET EVERY DAY    Hypertension goal BP (blood pressure) < 140/90       * PRESERVISION AREDS 2 Caps      One in AM and one in PM        * CENTRUM SILVER ADULT 50+ Tabs      Take 1 capsule by mouth        TYLENOL 500 MG tablet   Generic drug:  acetaminophen      Take 2 tablets (1,000 mg) by mouth 2 times daily as needed for mild pain        * Notice:  This list has 2 medication(s) that are the same as other medications prescribed for you. Read the directions carefully, and ask your doctor or other care provider to review them with you.

## 2017-09-26 NOTE — PROGRESS NOTES
Subjective:    HPI                    Objective:    System    Physical Exam    General     ROS    Assessment/Plan:      SUBJECTIVE  Subjective changes as noted by pt:  Was very sore in the muscles from having done more repetitions. The increased soreness lasted only a day or so. Overall feeling pretty good. Still has some soreness and the exercises are challenging.      Current pain level: 1/10     Changes in function:  None     Adverse reaction to treatment or activity:  None    OBJECTIVE  Changes in objective findings:  Yes, R hip abduction 5/5        ASSESSMENT  Kate continues to require intervention to meet STG and LTG's: PT  Patient is progressing as expected.  Response to therapy has shown an improvement in  pain level  Progress made towards STG/LTG?  None    PLAN  Continue current treatment plan until patient demonstrates readiness to progress to higher level exercises.    PTA/ATC plan:  N/A    Please refer to the daily flowsheet for treatment today, total treatment time and time spent performing 1:1 timed codes.

## 2017-10-02 ENCOUNTER — THERAPY VISIT (OUTPATIENT)
Dept: PHYSICAL THERAPY | Facility: CLINIC | Age: 82
End: 2017-10-02
Payer: MEDICARE

## 2017-10-02 DIAGNOSIS — M25.551 HIP PAIN, RIGHT: ICD-10-CM

## 2017-10-02 PROCEDURE — 97110 THERAPEUTIC EXERCISES: CPT | Mod: GP | Performed by: PHYSICAL THERAPIST

## 2017-10-02 PROCEDURE — 97140 MANUAL THERAPY 1/> REGIONS: CPT | Mod: GP | Performed by: PHYSICAL THERAPIST

## 2017-10-02 NOTE — MR AVS SNAPSHOT
After Visit Summary   10/2/2017    Kate Isaac    MRN: [id number]           Patient Information     Date Of Birth          1935        Visit Information        Provider Department      10/2/2017 3:40 PM Arian Wynne PT [facility] Therapy        Today's Diagnoses     Hip pain, right           Follow-ups after your visit        Your next 10 appointments already scheduled     Oct 09, 2017  3:40 PM CDT   KEVIN Extremity with Arian Wynne PT   [facility]    [address]  [facility] [id number] [phone]            Oct 11, 2017 10:20 AM CDT   Office Visit with Lani Waters MD   [facility] ([facility]    [address] [facility] [address] [phone]           Bring a current list of meds and any records pertaining to this visit. For Physicals, please bring immunization records and any forms needing to be filled out. Please arrive 10 minutes early to complete paperwork.              Who to contact     If you have questions or need follow up information about today's clinic visit or your schedule please contact [facility] directly at [phone].  Normal or non-critical lab and imaging results will be communicated to you by MyChart, letter or phone within 4 business days after the clinic has received the results. If you do not hear from us within 7 days, please contact the clinic through MyChart or phone. If you have a critical or abnormal lab result, we will notify you by phone as soon as possible.  Submit refill requests through ChangeMob or call your pharmacy and they will forward the refill request to us. Please allow 3 business days for your refill to be completed.          Additional Information About Your Visit        MyChart Information     [employer]Mob gives you  secure access to your electronic health record. If you see a primary care provider, you can also send messages to your care team and make appointments. If you have questions, please call your primary care clinic.  If you do not have a primary care provider, please call [phone] and they will assist you.        Care EveryWhere ID     This is your Care EveryWhere ID. This could be used by other organizations to access your [facility] medical records  [id number]         Blood Pressure from Last 3 Encounters:   09/18/17 165/73   08/23/17 140/60   08/19/17 140/80    Weight from Last 3 Encounters:   09/18/17 62.5 kg (137 lb 12.8 oz)   08/23/17 62.1 kg (137 lb)   08/19/17 64.9 kg (143 lb)              We Performed the Following     MANUAL THER TECH,1+REGIONS,EA 15 MIN     THERAPEUTIC EXERCISES          Today's Medication Changes          These changes are accurate as of: 10/2/17  4:21 PM.  If you have any questions, ask your nurse or doctor.               These medicines have changed or have updated prescriptions.        Dose/Directions    amLODIPine 5 MG tablet   Commonly known as:  NORVASC   This may have changed:  additional instructions   Used for:  Hypertension goal BP (blood pressure) < 140/90        TAKE 1 TABLET EVERY DAY   Quantity:  90 tablet   Refills:  3       losartan 50 MG tablet   Commonly known as:  COZAAR   This may have changed:  additional instructions   Used for:  Hypertension goal BP (blood pressure) < 140/90        TAKE 1 TABLET EVERY DAY   Quantity:  90 tablet   Refills:  1                Primary Care Provider Office Phone # Fax #    Lani Waters [phone] [address] [facility] [address]        Equal Access to Services     [facility] : Hadii chad kline Solitzy, waaxda luqadaha, qaybta kaalmada christi barron. So [facility] [phone].    ATENCIÓN: Si habla español, tiene a macias disposición servicios gratuitos de asistencia  lingüísticaTroy Martin al [phone].    We comply with applicable federal civil rights laws and [place] laws. We do not discriminate on the basis of race, color, national origin, age, disability, sex, sexual orientation, or gender identity.            Thank you!     Thank you for choosing [facility] FOR ATHLETIC [facility]ICINE [facility]  for your care. Our goal is always to provide you with excellent care. Hearing back from our patients is one way we can continue to improve our services. Please take a few minutes to complete the written survey that you may receive in the mail after your visit with us. Thank you!             Your Updated Medication List - Protect others around you: Learn how to safely use, store and throw away your medicines at www.disposemymeds.org.          This list is accurate as of: 10/2/17  4:21 PM.  Always use your most recent med list.                   Brand Name Dispense Instructions for use Diagnosis    amLODIPine 5 MG tablet    NORVASC    90 tablet    TAKE 1 TABLET EVERY DAY    Hypertension goal BP (blood pressure) < 140/90       aspirin 81 MG tablet      1 TABLET DAILY PM        calcium carb 1250 mg (500 mg [place])/vitamin D 200 units 500-200 MG-UNIT per tablet    OSCAL with D     Take 1 tablet by mouth 2 times daily (with meals)        FIBER PO      Take 2 capsules by mouth At Bedtime        FLAX PO           GLUCOSAMINE CHONDROITIN Tabs      1 twice daily        losartan 50 MG tablet    COZAAR    90 tablet    TAKE 1 TABLET EVERY DAY    Hypertension goal BP (blood pressure) < 140/90       * PRESERVISION AREDS 2 Caps      One in AM and one in PM        * CENTRUM SILVER ADULT 50+ Tabs      Take 1 capsule by mouth        TYLENOL 500 MG tablet   Generic drug:  acetaminophen      Take 2 tablets (1,000 mg) by mouth 2 times daily as needed for mild pain        * Notice:  This list has 2 medication(s) that are the same as other medications prescribed for you. Read the directions  carefully, and ask your doctor or other care provider to review them with you.

## 2017-10-02 NOTE — PROGRESS NOTES
Subjective:    HPI                    Objective:    System    Physical Exam    General     ROS    Assessment/Plan:      SUBJECTIVE  Subjective changes as noted by pt:  Has had some increase in pain over the past few days and not sure why (B lateral hips).  Has been able to continued the strengthening exercises, however.      Current pain level: 5/10     Changes in function:  Yes (See Goal flowsheet attached for changes in current functional level)     Adverse reaction to treatment or activity:  None    OBJECTIVE  Changes in objective findings:  Yes, multiple cues for excessive anterior knee excursion on knee bends.         ASSESSMENT  Kate continues to require intervention to meet STG and LTG's: PT  Patient has experienced an exacerbation of symptoms.  Response to therapy has shown a worsening of  pain level  Progress made towards STG/LTG?  Yes, see physical exam    PLAN  Continue current treatment plan until patient demonstrates readiness to progress to higher level exercises.    PTA/ATC plan:  N/A    Please refer to the daily flowsheet for treatment today, total treatment time and time spent performing 1:1 timed codes.

## 2017-10-06 NOTE — PROGRESS NOTES
SUBJECTIVE:   Kate Isaac is a 81 year old female who presents to clinic today for the following health issues:      Follow up irregular heart beat per cardiologist   Has been taking blood pressure  And pulse          Reviewed note from cardiology. No sustained worrisome cardiac rhythms  Pt is asymptomatic and exercises regularly. No further treatment unless she develops symptoms.     Pt with HTN, well controlled per numerous home BP readings, usually high in office due to white coat HTN  Will refill meds and check labs as pt is here today    Pt to do some traveling over seas. Would like medication for travelers diarrhea if needed.     Problem list and histories reviewed & adjusted, as indicated.  Additional history: as documented    Labs reviewed in EPIC    Reviewed and updated as needed this visit by clinical staff  Tobacco  Allergies  Meds  Med Hx  Surg Hx  Fam Hx  Soc Hx      Reviewed and updated as needed this visit by Provider         ROS:  Constitutional, HEENT, cardiovascular, pulmonary, gi and gu systems are negative, except as otherwise noted.      OBJECTIVE:   /80  Pulse 97  Temp 97.3  F (36.3  C) (Oral)  Wt 138 lb (62.6 kg)  BMI 24.06 kg/m2  Body mass index is 24.06 kg/(m^2).  GENERAL: healthy, alert and no distress  NECK: no adenopathy, no asymmetry, masses, or scars and thyroid normal to palpation  RESP: lungs clear to auscultation - no rales, rhonchi or wheezes  CV: regular rate and rhythm, normal S1 S2, no S3 or S4, no murmur, click or rub, no peripheral edema and peripheral pulses strong  ABDOMEN: soft, nontender, no hepatosplenomegaly, no masses and bowel sounds normal  MS: no gross musculoskeletal defects noted, no edema    Diagnostic Test Results:  none     ASSESSMENT/PLAN:     1. Nonsustained paroxysmal ventricular tachycardia (H)  As above, per cardiology    2. Traveler's diarrhea  As above  - ciprofloxacin (CIPRO) 500 MG tablet; Take 1 tablet (500 mg) by mouth 2 times  daily  Dispense: 6 tablet; Refill: 0    3. Hypertension goal BP (blood pressure) < 140/90  As above, fu in spring  - Basic metabolic panel  - losartan (COZAAR) 50 MG tablet; TAKE 1 TABLET EVERY DAY IN AM  Dispense: 90 tablet; Refill: 1        Lani Licea MD  [facility]

## 2017-10-09 ENCOUNTER — THERAPY VISIT (OUTPATIENT)
Dept: PHYSICAL THERAPY | Facility: CLINIC | Age: 82
End: 2017-10-09
Payer: MEDICARE

## 2017-10-09 DIAGNOSIS — M25.551 HIP PAIN, RIGHT: ICD-10-CM

## 2017-10-09 PROCEDURE — 97110 THERAPEUTIC EXERCISES: CPT | Mod: GP | Performed by: PHYSICAL THERAPIST

## 2017-10-09 PROCEDURE — 97140 MANUAL THERAPY 1/> REGIONS: CPT | Mod: GP | Performed by: PHYSICAL THERAPIST

## 2017-10-09 NOTE — PROGRESS NOTES
Subjective:    HPI                    Objective:    System    Physical Exam    General     ROS    Assessment/Plan:      SUBJECTIVE  Subjective changes as noted by pt:  50% to where she wants to be. No longer hurting going down stairs. Sometimes still gets pain in low back.  Strength exercises are still challening.     Current pain level: 2/10     Changes in function:  None     Adverse reaction to treatment or activity:  None    OBJECTIVE  Changes in objective findings:  Yes, B hip abduction 4+/5        ASSESSMENT  Kate continues to require intervention to meet STG and LTG's: PT  Patient is progressing as expected.  Response to therapy has shown an improvement in  pain level  Progress made towards STG/LTG?  None    PLAN  Continue current treatment plan until patient demonstrates readiness to progress to higher level exercises.    PTA/ATC plan:  N/A    Please refer to the daily flowsheet for treatment today, total treatment time and time spent performing 1:1 timed codes.

## 2017-10-09 NOTE — MR AVS SNAPSHOT
After Visit Summary   10/9/2017    Kate Isaac    MRN: [id number]           Patient Information     Date Of Birth          1935        Visit Information        Provider Department      10/9/2017 3:40 PM Arian Wynne, PT [facility] Physical Therapy        Today's Diagnoses     Hip pain, right           Follow-ups after your visit        Your next 10 appointments already scheduled     Oct 11, 2017 10:20 AM CDT   [facility]    [address] [facility] [address] [phone]           Bring a current list of meds and any records pertaining to this visit. For Physicals, please bring immunization records and any forms needing to be filled out. Please arrive 10 minutes early to complete paperwork.            Oct 16, 2017 10:10 AM CDT   KEVIN Extremity with Arian Wynne PT   [facility] Physical Therapy ([facility])    [address]  [facility] [address] [phone]              Who to contact     If you have questions or need follow up information about today's clinic visit or your schedule please contact [facility] PHYSICAL THERAPY directly at [phone].  Normal or non-critical lab and imaging results will be communicated to you by MyChart, letter or phone within 4 business days after the clinic has received the results. If you do not hear from us within 7 days, please contact the clinic through MyChart or phone. If you have a critical or abnormal lab result, we will notify you by phone as soon as possible.  Submit refill requests through [employer] or call your pharmacy and they will forward the refill request to us. Please allow 3 business days for your refill to be completed.          Additional Information About Your Visit        MyChart Information     [employer] gives you  secure access to your electronic health record. If you see a primary care provider, you can also send messages to your care team and make appointments. If you have questions, please call your primary care clinic.  If you do not have a primary care provider, please call [phone] and they will assist you.        Care EveryWhere ID     This is your Care EveryWhere ID. This could be used by other organizations to access your [facility] medical records  [id number]         Blood Pressure from Last 3 Encounters:   09/18/17 165/73   08/23/17 140/60   08/19/17 140/80    Weight from Last 3 Encounters:   09/18/17 62.5 kg (137 lb 12.8 oz)   08/23/17 62.1 kg (137 lb)   08/19/17 64.9 kg (143 lb)              We Performed the Following     MANUAL THER TECH,1+REGIONS,EA 15 MIN     THERAPEUTIC EXERCISES          Today's Medication Changes          These changes are accurate as of: 10/9/17  4:23 PM.  If you have any questions, ask your nurse or doctor.               These medicines have changed or have updated prescriptions.        Dose/Directions    amLODIPine 5 MG tablet   Commonly known as:  NORVASC   This may have changed:  additional instructions   Used for:  Hypertension goal BP (blood pressure) < 140/90        TAKE 1 TABLET EVERY DAY   Quantity:  90 tablet   Refills:  3       losartan 50 MG tablet   Commonly known as:  COZAAR   This may have changed:  additional instructions   Used for:  Hypertension goal BP (blood pressure) < 140/90        TAKE 1 TABLET EVERY DAY   Quantity:  90 tablet   Refills:  1                Primary Care Provider Office Phone # Fax #    Lani Waters [phone] [address] [facility] [address]        Equal Access to Services     [facility] : Hadii chad kline Solitzy, waaxda luqadaha, qaybta kaalmada christi barron. So [facility] [phone].    ATENCIÓN: Si habla español, tiene a macias disposición servicios gratuitos de asistencia  lingüísticaTroy Martin al [phone].    We comply with applicable federal civil rights laws and [place] laws. We do not discriminate on the basis of race, color, national origin, age, disability, sex, sexual orientation, or gender identity.            Thank you!     Thank you for choosing [place] FOR ATHLETIC MEDICINE [facility] PHYSICAL [facility]  for your care. Our goal is always to provide you with excellent care. Hearing back from our patients is one way we can continue to improve our services. Please take a few minutes to complete the written survey that you may receive in the mail after your visit with us. Thank you!             Your Updated Medication List - Protect others around you: Learn how to safely use, store and throw away your medicines at www.disposemymeds.org.          This list is accurate as of: 10/9/17  4:23 PM.  Always use your most recent med list.                   Brand Name Dispense Instructions for use Diagnosis    amLODIPine 5 MG tablet    NORVASC    90 tablet    TAKE 1 TABLET EVERY DAY    Hypertension goal BP (blood pressure) < 140/90       aspirin 81 MG tablet      1 TABLET DAILY PM        calcium carb 1250 mg (500 mg [place])/vitamin D 200 units 500-200 MG-UNIT per tablet    OSCAL with D     Take 1 tablet by mouth 2 times daily (with meals)        FIBER PO      Take 2 capsules by mouth At Bedtime        FLAX PO           GLUCOSAMINE CHONDROITIN Tabs      1 twice daily        losartan 50 MG tablet    COZAAR    90 tablet    TAKE 1 TABLET EVERY DAY    Hypertension goal BP (blood pressure) < 140/90       * PRESERVISION AREDS 2 Caps      One in AM and one in PM        * CENTRUM SILVER ADULT 50+ Tabs      Take 1 capsule by mouth        TYLENOL 500 MG tablet   Generic drug:  acetaminophen      Take 2 tablets (1,000 mg) by mouth 2 times daily as needed for mild pain        * Notice:  This list has 2 medication(s) that are the same as other medications prescribed for you. Read the directions  carefully, and ask your doctor or other care provider to review them with you.

## 2017-10-11 ENCOUNTER — OFFICE VISIT (OUTPATIENT)
Dept: FAMILY MEDICINE | Facility: CLINIC | Age: 82
End: 2017-10-11
Payer: COMMERCIAL

## 2017-10-11 VITALS
SYSTOLIC BLOOD PRESSURE: 154 MMHG | TEMPERATURE: 97.3 F | HEART RATE: 97 BPM | WEIGHT: 138 LBS | BODY MASS INDEX: 24.06 KG/M2 | DIASTOLIC BLOOD PRESSURE: 80 MMHG

## 2017-10-11 DIAGNOSIS — I10 HYPERTENSION GOAL BP (BLOOD PRESSURE) < 140/90: ICD-10-CM

## 2017-10-11 DIAGNOSIS — A09 TRAVELER'S DIARRHEA: ICD-10-CM

## 2017-10-11 DIAGNOSIS — I47.29 NONSUSTAINED PAROXYSMAL VENTRICULAR TACHYCARDIA (H): Primary | ICD-10-CM

## 2017-10-11 LAB
ANION GAP SERPL CALCULATED.3IONS-SCNC: 8 MMOL/L (ref 3–14)
BUN SERPL-MCNC: 12 MG/DL (ref 7–30)
CALCIUM SERPL-MCNC: 9.6 MG/DL (ref 8.5–10.1)
CHLORIDE SERPL-SCNC: 102 MMOL/L (ref 94–109)
CO2 SERPL-SCNC: 29 MMOL/L (ref 20–32)
CREAT SERPL-MCNC: 0.46 MG/DL (ref 0.52–1.04)
GFR SERPL CREATININE-BSD FRML MDRD: >90 ML/MIN/1.7M2
GLUCOSE SERPL-MCNC: 92 MG/DL (ref 70–99)
POTASSIUM SERPL-SCNC: 4.3 MMOL/L (ref 3.4–5.3)
SODIUM SERPL-SCNC: 139 MMOL/L (ref 133–144)

## 2017-10-11 PROCEDURE — 99214 OFFICE O/P EST MOD 30 MIN: CPT | Performed by: FAMILY MEDICINE

## 2017-10-11 PROCEDURE — 80048 BASIC METABOLIC PNL TOTAL CA: CPT | Performed by: FAMILY MEDICINE

## 2017-10-11 PROCEDURE — 36415 COLL VENOUS BLD VENIPUNCTURE: CPT | Performed by: FAMILY MEDICINE

## 2017-10-11 RX ORDER — CIPROFLOXACIN 500 MG/1
500 TABLET, FILM COATED ORAL 2 TIMES DAILY
Qty: 6 TABLET | Refills: 0 | Status: SHIPPED | OUTPATIENT
Start: 2017-10-11 | End: 2017-12-11

## 2017-10-11 RX ORDER — LOSARTAN POTASSIUM 50 MG/1
TABLET ORAL
Qty: 90 TABLET | Refills: 1 | Status: SHIPPED | OUTPATIENT
Start: 2017-10-11 | End: 2017-10-11

## 2017-10-11 RX ORDER — LOSARTAN POTASSIUM 50 MG/1
TABLET ORAL
Qty: 90 TABLET | Refills: 1 | Status: SHIPPED | OUTPATIENT
Start: 2017-10-11 | End: 2018-04-30

## 2017-10-11 NOTE — NURSING NOTE
"Chief Complaint   Patient presents with     Irregular Heart Beat     follow up per cardiologist       Initial /86  Pulse 97  Temp 97.3  F (36.3  C) (Oral)  Wt 138 lb (62.6 kg)  BMI 24.06 kg/m2 Estimated body mass index is 24.06 kg/(m^2) as calculated from the following:    Height as of 8/19/17: 5' 3.5\" (1.613 m).    Weight as of this encounter: 138 lb (62.6 kg).  Medication Reconciliation: complete     Alanna Silva MA      "

## 2017-10-11 NOTE — MR AVS SNAPSHOT
After Visit Summary   10/11/2017    Kate Isaac    MRN: [id number]           Patient Information     Date Of Birth          1935        Visit Information        Provider Department      10/11/2017 10:20 AM Lani Waters MD [facility]        Today's Diagnoses     Nonsustained paroxysmal ventricular tachycardia (H)    -  1    Traveler's diarrhea        Hypertension goal BP (blood pressure) < 140/90           Follow-ups after your visit        Your next 10 appointments already scheduled     Oct 16, 2017 10:10 AM CDT   KEVIN Extremity with Arian Wynne PT   [facility] ([facility])    [address]  [facility] [address] [phone]              Who to contact     If you have questions or need follow up information about today's clinic visit or your schedule please contact [facility] directly at [phone].  Normal or non-critical lab and imaging results will be communicated to you by NextMediumhart, letter or phone within 4 business days after the clinic has received the results. If you do not hear from us within 7 days, please contact the clinic through NextMediumhart or phone. If you have a critical or abnormal lab result, we will notify you by phone as soon as possible.  Submit refill requests through [employer] or call your pharmacy and they will forward the refill request to us. Please allow 3 business days for your refill to be completed.          Additional Information About Your Visit        MyChart Information     [employer] gives you secure access to your electronic health record. If you see a primary care provider, you can also send messages to your care team and make appointments. If you have questions, please call your primary care clinic.  If you do not have a primary care provider, please call [phone] and they will assist you.        Care EveryWhere ID     This is your Care EveryWhere  ID. This could be used by other organizations to access your [place] medical records  [id number]        Your Vitals Were     Pulse Temperature BMI (Body Mass Index)             97 97.3  F (36.3  C) (Oral) 24.06 kg/m2          Blood Pressure from Last 3 Encounters:   10/11/17 154/80   09/18/17 165/73   08/23/17 140/60    Weight from Last 3 Encounters:   10/11/17 138 lb (62.6 kg)   09/18/17 137 lb 12.8 oz (62.5 kg)   08/23/17 137 lb (62.1 kg)              We Performed the Following     Basic metabolic panel          Today's Medication Changes          These changes are accurate as of: 10/11/17 11:21 AM.  If you have any questions, ask your nurse or doctor.               Start taking these medicines.        Dose/Directions    ciprofloxacin 500 MG tablet   Commonly known as:  CIPRO   Used for:  Traveler's diarrhea   Started by:  Lani Waters MD        Dose:  500 mg   Take 1 tablet (500 mg) by mouth 2 times daily   Quantity:  6 tablet   Refills:  0       losartan 50 MG tablet   Commonly known as:  COZAAR   Used for:  Hypertension goal BP (blood pressure) < 140/90   Started by:  Lani Waters MD        TAKE 1 TABLET EVERY DAY IN AM   Quantity:  90 tablet   Refills:  1         These medicines have changed or have updated prescriptions.        Dose/Directions    amLODIPine 5 MG tablet   Commonly known as:  NORVASC   This may have changed:  additional instructions   Used for:  Hypertension goal BP (blood pressure) < 140/90        TAKE 1 TABLET EVERY DAY   Quantity:  90 tablet   Refills:  3         Stop taking these medicines if you haven't already. Please contact your care team if you have questions.     TYLENOL 500 MG tablet   Generic drug:  acetaminophen   Stopped by:  Lani Waters MD                Where to get your medicines      These medications were sent to [facility] [address] [place] [address] [facility]  [address] [facility] [address]     Phone:  [phone]     ciprofloxacin  500 MG tablet         Some of these will need a paper prescription and others can be bought over the counter.  Ask your nurse if you have questions.     Bring a paper prescription for each of these medications     losartan 50 MG tablet                Primary Care Provider Office Phone # Fax #    Lani Waters [phone] [address] [facility] [address]        Equal Access to Services     [facility] : Hadii aad ku hadasho Soomaali, waaxda luqadaha, qaybta kaalmada adeegyada, waxay idiin hayaan adeeg khviolash laAliabalwindern ah. So [facility] [phone].    ATENCIÓN: Si habla español, tiene a macias disposición servicios gratuitos de asistencia lingüística. Llame al [phone].    We comply with applicable federal civil rights laws and [place] laws. We do not discriminate on the basis of race, color, national origin, age, disability, sex, sexual orientation, or gender identity.            Thank you!     Thank you for choosing [facility]  for your care. Our goal is always to provide you with excellent care. Hearing back from our patients is one way we can continue to improve our services. Please take a few minutes to complete the written survey that you may receive in the mail after your visit with us. Thank you!             Your Updated Medication List - Protect others around you: Learn how to safely use, store and throw away your medicines at www.disposemymeds.org.          This list is accurate as of: 10/11/17 11:21 AM.  Always use your most recent med list.                   Brand Name Dispense Instructions for use Diagnosis    amLODIPine 5 MG tablet    NORVASC    90 tablet    TAKE 1 TABLET EVERY DAY    Hypertension goal BP (blood pressure) < 140/90       aspirin 81 MG tablet      1 TABLET DAILY PM        calcium carb 1250 mg (500 mg [place] IRA)/vitamin D 200 units 500-200 MG-UNIT per tablet    OSCAL with D     Take 1 tablet by mouth 2 times daily (with meals)        ciprofloxacin 500 MG  tablet    CIPRO    6 tablet    Take 1 tablet (500 mg) by mouth 2 times daily    Traveler's diarrhea       FIBER PO      Take 2 capsules by mouth At Bedtime        FLAX PO           GLUCOSAMINE CHONDROITIN Tabs      1 twice daily        losartan 50 MG tablet    COZAAR    90 tablet    TAKE 1 TABLET EVERY DAY IN AM    Hypertension goal BP (blood pressure) < 140/90       PRESERVISION AREDS 2 Caps      One in AM and one in PM

## 2017-10-16 ENCOUNTER — THERAPY VISIT (OUTPATIENT)
Dept: PHYSICAL THERAPY | Facility: CLINIC | Age: 82
End: 2017-10-16
Payer: MEDICARE

## 2017-10-16 DIAGNOSIS — M25.551 HIP PAIN, RIGHT: ICD-10-CM

## 2017-10-16 PROCEDURE — 97110 THERAPEUTIC EXERCISES: CPT | Mod: GP | Performed by: PHYSICAL THERAPIST

## 2017-10-16 PROCEDURE — 97140 MANUAL THERAPY 1/> REGIONS: CPT | Mod: GP | Performed by: PHYSICAL THERAPIST

## 2017-10-16 NOTE — PROGRESS NOTES
Subjective:    HPI                    Objective:    System    Physical Exam    General     ROS    Assessment/Plan:      SUBJECTIVE  Subjective changes as noted by pt:   A little more improvement from last week. Denies any R thigh/leg pain. Using EIS to manage that. Continues HEP. Going out of town for about 10 days this week on a cruise.      Current pain level: 1/10     Changes in function:  None     Adverse reaction to treatment or activity:  None    OBJECTIVE  Changes in objective findings:  Yes, lumbar AROM WNL pain free.  Tendency toward valgus on L knee with knee bends. Mild distal L ITB on return to stand abolished with cues to avoid the valgus.         ASSESSMENT  Kate continues to require intervention to meet STG and LTG's: PT  Patient is progressing as expected.  Response to therapy has shown an improvement in  pain level  Progress made towards STG/LTG?  None    PLAN  Continue current treatment plan until patient demonstrates readiness to progress to higher level exercises.    PTA/ATC plan:  N/A    Please refer to the daily flowsheet for treatment today, total treatment time and time spent performing 1:1 timed codes.

## 2017-10-16 NOTE — MR AVS SNAPSHOT
After Visit Summary   10/16/2017    Kate Isaac    MRN: [id number]           Patient Information     Date Of Birth          1935        Visit Information        Provider Department      10/16/2017 10:10 AM Arian Wynne, PT [facility] Physical Therapy        Today's Diagnoses     Hip pain, right           Follow-ups after your visit        Your next 10 appointments already scheduled     Nov 06, 2017 10:10 AM CST   KEVIN Extremity with Arian Wynne PT   [facility] Physical Therapy ([facility])    [address]  [facility] [id number] [phone]              Who to contact     If you have questions or need follow up information about today's clinic visit or your schedule please contact [facility] PHYSICAL THERAPY directly at [phone].  Normal or non-critical lab and imaging results will be communicated to you by SpinVoxhart, letter or phone within 4 business days after the clinic has received the results. If you do not hear from us within 7 days, please contact the clinic through SpinVoxhart or phone. If you have a critical or abnormal lab result, we will notify you by phone as soon as possible.  Submit refill requests through [employer] or call your pharmacy and they will forward the refill request to us. Please allow 3 business days for your refill to be completed.          Additional Information About Your Visit        MyChart Information     [employer] gives you secure access to your electronic health record. If you see a primary care provider, you can also send messages to your care team and make appointments. If you have questions, please call your primary care clinic.  If you do not have a primary care provider, please call [phone] and they will assist you.        Care EveryWhere ID     This is your Care EveryWhere ID. This could be used by other  organizations to access your [place] medical records  [id number]         Blood Pressure from Last 3 Encounters:   10/11/17 154/80   09/18/17 165/73   08/23/17 140/60    Weight from Last 3 Encounters:   10/11/17 62.6 kg (138 lb)   09/18/17 62.5 kg (137 lb 12.8 oz)   08/23/17 62.1 kg (137 lb)              We Performed the Following     MANUAL THER TECH,1+REGIONS,EA 15 MIN     THERAPEUTIC EXERCISES          Today's Medication Changes          These changes are accurate as of: 10/16/17 10:54 AM.  If you have any questions, ask your nurse or doctor.               These medicines have changed or have updated prescriptions.        Dose/Directions    amLODIPine 5 MG tablet   Commonly known as:  NORVASC   This may have changed:  additional instructions   Used for:  Hypertension goal BP (blood pressure) < 140/90        TAKE 1 TABLET EVERY DAY   Quantity:  90 tablet   Refills:  3                Primary Care Provider Office Phone # Fax #    Lani Waters [phone] [address] [facility] [address]        Equal Access to Services     RAUL [facility]ESH : Hadii chad ku hadasho Soomaali, waaxda luqadaha, qaybta kaalmada adeegalena, christi colvin . So [place] [phone].    ATENCIÓN: Si habla español, tiene a macias disposición servicios gratuitos de asistencia lingüística. [facility] [phone].    We comply with applicable federal civil rights laws and [place] laws. We do not discriminate on the basis of race, color, national origin, age, disability, sex, sexual orientation, or gender identity.            Thank you!     Thank you for choosing [facility] PHYSICAL THERAPY  for your care. Our goal is always to provide you with excellent care. Hearing back from our patients is one way we can continue to improve our services. Please take a few minutes to complete the written survey that you may receive in the mail after your visit with us. Thank  you!             Your Updated Medication List - Protect others around you: Learn how to safely use, store and throw away your medicines at www.disposemymeds.org.          This list is accurate as of: 10/16/17 10:54 AM.  Always use your most recent med list.                   Brand Name Dispense Instructions for use Diagnosis    amLODIPine 5 MG tablet    NORVASC    90 tablet    TAKE 1 TABLET EVERY DAY    Hypertension goal BP (blood pressure) < 140/90       aspirin 81 MG tablet      1 TABLET DAILY PM        calcium carb 1250 mg (500 mg [place])/vitamin D 200 units 500-200 MG-UNIT per tablet    OSCAL with D     Take 1 tablet by mouth 2 times daily (with meals)        ciprofloxacin 500 MG tablet    CIPRO    6 tablet    Take 1 tablet (500 mg) by mouth 2 times daily    Traveler's diarrhea       FIBER PO      Take 2 capsules by mouth At Bedtime        FLAX PO           GLUCOSAMINE CHONDROITIN Tabs      1 twice daily        losartan 50 MG tablet    COZAAR    90 tablet    TAKE 1 TABLET EVERY DAY IN AM    Hypertension goal BP (blood pressure) < 140/90       PRESERVISION AREDS 2 Caps      One in AM and one in PM

## 2017-11-06 ENCOUNTER — THERAPY VISIT (OUTPATIENT)
Dept: PHYSICAL THERAPY | Facility: CLINIC | Age: 82
End: 2017-11-06
Payer: MEDICARE

## 2017-11-06 DIAGNOSIS — M25.551 HIP PAIN, RIGHT: ICD-10-CM

## 2017-11-06 PROCEDURE — G8985 CARRY GOAL STATUS: HCPCS | Mod: GP | Performed by: PHYSICAL THERAPIST

## 2017-11-06 PROCEDURE — 97140 MANUAL THERAPY 1/> REGIONS: CPT | Mod: GP | Performed by: PHYSICAL THERAPIST

## 2017-11-06 PROCEDURE — G8986 CARRY D/C STATUS: HCPCS | Mod: GP | Performed by: PHYSICAL THERAPIST

## 2017-11-06 PROCEDURE — 97110 THERAPEUTIC EXERCISES: CPT | Mod: GP | Performed by: PHYSICAL THERAPIST

## 2017-11-06 NOTE — PROGRESS NOTES
Subjective:    HPI                    Objective:    System                                           Hip Evaluation    Hip Strength:        Abduction:  Left: 5-/5     Pain:Right: 5-/5    Pain:                                   Rosalind Lumbar Evaluation      Movement Loss:  Flexion (Flex): nil  Extension (EXT): min  Side Glide R (SG R): nil  Side [place] L (SG L): nil                                               ROS    Assessment/Plan:      DISCHARGE  REPORT    Progress reporting period is from 8/28/17 to 11/6/17.       SUBJECTIVE  Subjective changes noted by patient:  Kate reports that she is doing well. Not having nearly as much pain in her low back or B hips. 80-90% to where she wants to be. Continues her exercises, denies any restriction in her activity due to pain.      Current pain level is 0/10  .     Previous pain level was  2/10  .   Changes in function:  Yes (See Goal flowsheet attached for changes in current functional level)  Adverse reaction to treatment or activity: None    OBJECTIVE  Changes noted in objective findings:  Yes, See physical exam section and/or daily flowsheet for response to repeated movements.           ASSESSMENT/PLAN  Updated problem list and treatment plan: Diagnosis 1:  R trochanteric bursitis  Pain -  manual therapy, education, directional preference exercise and home program  Decreased ROM/flexibility - manual therapy, therapeutic exercise, therapeutic activity and home program  Decreased strength - therapeutic exercise, therapeutic activities and home program  Inflammation - self management/home program  Decreased function - therapeutic activities and home program  Impaired posture - neuro re-education, therapeutic activities and home program  STG/LTGs have been met or progress has been made towards goals:  Yes (See Goal flow sheet completed today.)  Assessment of Progress: Patient is meeting short term goals and is progressing towards long term goals.  Self Management Plans:   Patient has been instructed in a home treatment program.  Patient  has been instructed in self management of symptoms.  I have re-evaluated this patient and find that the nature, scope, duration and intensity of the therapy is appropriate for the medical condition of the patient.  Kate continues to require the following intervention to meet STG and LTG's:  PT intervention is no longer required to meet STG/LTG.    Recommendations:  This patient is ready to be discharged from therapy and continue their home treatment program.    Please refer to the daily flowsheet for treatment today, total treatment time and time spent performing 1:1 timed codes.

## 2017-11-06 NOTE — MR AVS SNAPSHOT
After Visit Summary   11/6/2017    Kate Isaac    MRN: [id number]           Patient Information     Date Of Birth          1935        Visit Information        Provider Department      11/6/2017 10:10 AM Arian Wynne, PT [facility] Physical Therapy        Today's Diagnoses     Hip pain, right           Follow-ups after your visit        Who to contact     If you have questions or need follow up information about today's clinic visit or your schedule please contact [facility] PHYSICAL [facility] directly at [phone].  Normal or non-critical lab and imaging results will be communicated to you by Anghamihart, letter or phone within 4 business days after the clinic has received the results. If you do not hear from us within 7 days, please contact the clinic through [employer] or phone. If you have a critical or abnormal lab result, we will notify you by phone as soon as possible.  Submit refill requests through [employer] or call your pharmacy and they will forward the refill request to us. Please allow 3 business days for your refill to be completed.          Additional Information About Your Visit        MyChart Information     [employer] gives you secure access to your electronic health record. If you see a primary care provider, you can also send messages to your care team and make appointments. If you have questions, please call your primary care clinic.  If you do not have a primary care provider, please call [phone] and they will assist you.        Care EveryWhere ID     This is your Care EveryWhere ID. This could be used by other organizations to access your [place] medical records  [id number]         Blood Pressure from Last 3 Encounters:   10/11/17 154/80   09/18/17 165/73   08/23/17 140/60    Weight from Last 3 Encounters:   10/11/17 62.6 kg (138 lb)   09/18/17 62.5 kg (137 lb 12.8 oz)   08/23/17 62.1 kg (137 lb)               We Performed the Following     KEVIN PROGRESS NOTES REPORT     MANUAL THER TECH,1+REGIONS,EA 15 MIN     THERAPEUTIC EXERCISES          Today's Medication Changes          These changes are accurate as of: 11/6/17 10:51 AM.  If you have any questions, ask your nurse or doctor.               These medicines have changed or have updated prescriptions.        Dose/Directions    amLODIPine 5 MG tablet   Commonly known as:  NORVASC   This may have changed:  additional instructions   Used for:  Hypertension goal BP (blood pressure) < 140/90        TAKE 1 TABLET EVERY DAY   Quantity:  90 tablet   Refills:  3                Primary Care Provider Office Phone # Fax #    Lani Waters [phone] [address] [facility] [address]        Equal Access to Services     MARTA AGUILAR : Hadii aad ku hadasho Solitzy, waaxda luqadaha, qaybta kaalmada adeegyada, christi colvin . So [facility] [phone].    ATENCIÓN: Si habla español, tiene a macias disposición servicios gratuitos de asistencia lingüística. Llame al [phone].    We comply with applicable federal civil rights laws and [place] laws. We do not discriminate on the basis of race, color, national origin, age, disability, sex, sexual orientation, or gender identity.            Thank you!     Thank you for choosing [facility]  for your care. Our goal is always to provide you with excellent care. Hearing back from our patients is one way we can continue to improve our services. Please take a few minutes to complete the written survey that you may receive in the mail after your visit with us. Thank you!             Your Updated Medication List - Protect others around you: Learn how to safely use, store and throw away your medicines at www.disposemymeds.org.          This list is accurate as of: 11/6/17 10:51 AM.  Always use your most recent med list.                    Brand Name Dispense Instructions for use Diagnosis    amLODIPine 5 MG tablet    NORVASC    90 tablet    TAKE 1 TABLET EVERY DAY    Hypertension goal BP (blood pressure) < 140/90       aspirin 81 MG tablet      1 TABLET DAILY PM        Calcium carb-Vitamin D 500 mg [place]-200 units 500-200 MG-UNIT per tablet    OSCAL with D;Oyster Shell Calcium     Take 1 tablet by mouth 2 times daily (with meals)        ciprofloxacin 500 MG tablet    CIPRO    6 tablet    Take 1 tablet (500 mg) by mouth 2 times daily    Traveler's diarrhea       FIBER PO      Take 2 capsules by mouth At Bedtime        FLAX PO           GLUCOSAMINE CHONDROITIN Tabs      1 twice daily        losartan 50 MG tablet    COZAAR    90 tablet    TAKE 1 TABLET EVERY DAY IN AM    Hypertension goal BP (blood pressure) < 140/90       PRESERVISION AREDS 2 Caps      One in AM and one in PM

## 2017-12-11 ENCOUNTER — OFFICE VISIT (OUTPATIENT)
Dept: FAMILY MEDICINE | Facility: CLINIC | Age: 82
End: 2017-12-11
Payer: COMMERCIAL

## 2017-12-11 VITALS
WEIGHT: 138 LBS | HEART RATE: 97 BPM | BODY MASS INDEX: 24.06 KG/M2 | SYSTOLIC BLOOD PRESSURE: 172 MMHG | DIASTOLIC BLOOD PRESSURE: 72 MMHG

## 2017-12-11 DIAGNOSIS — I10 WHITE COAT SYNDROME WITH HYPERTENSION: ICD-10-CM

## 2017-12-11 DIAGNOSIS — J01.90 ACUTE SINUSITIS WITH SYMPTOMS > 10 DAYS: Primary | ICD-10-CM

## 2017-12-11 PROCEDURE — 99213 OFFICE O/P EST LOW 20 MIN: CPT | Performed by: FAMILY MEDICINE

## 2017-12-11 RX ORDER — AZITHROMYCIN 250 MG/1
TABLET, FILM COATED ORAL
Qty: 6 TABLET | Refills: 0 | Status: SHIPPED | OUTPATIENT
Start: 2017-12-11 | End: 2018-05-26

## 2017-12-11 NOTE — PROGRESS NOTES
SUBJECTIVE:   Kate Isaac is a 82 year old female who presents to clinic today for the following health issues:        ENT Symptoms             Symptoms: cc Present Absent Comment   Fever/Chills   x    Fatigue  x     Muscle Aches   x    Eye Irritation  x  heavy   Sneezing   x    Nasal Edil/Drg  x     Sinus Pressure/Pain  x     Loss of smell   x    Dental pain   x    Sore Throat   x    Swollen Glands   x    Ear Pain/Fullness  x     Cough  x     Wheeze   x    Chest Pain   x    Shortness of breath   x    Rash   x    Other         Symptom duration:  11 days   Symptom severity:     Treatments tried:  water, airborne, vitamin drinks, sleep    Contacts:  was on 2 trips        Started in head, now more in chest  Recently back from trip overseas up 23 hours and exhausted  Has facial pressure. Over cheeks and lots of post nasal drainage        Problem list and histories reviewed & adjusted, as indicated.  Additional history: as documented    Labs reviewed in EPIC    Reviewed and updated as needed this visit by clinical staffTobacco  Allergies  Meds  Med Hx  Surg Hx  Fam Hx  Soc Hx      Reviewed and updated as needed this visit by Provider         ROS:  Constitutional, HEENT, cardiovascular, pulmonary, gi and gu systems are negative, except as otherwise noted.      OBJECTIVE:   /72  Pulse 97  Wt 138 lb (62.6 kg)  BMI 24.06 kg/m2  Body mass index is 24.06 kg/(m^2).  GENERAL: healthy, alert and no distress  EYES: Eyes grossly normal to inspection, PERRL and conjunctivae and sclerae normal  HENT: normal cephalic/atraumatic, ear canals and TM's normal, nose and mouth without ulcers or lesions, oropharynx clear, oral mucous membranes moist and sinuses: maxillary tenderness on bilateral  NECK: no adenopathy, no asymmetry, masses, or scars and thyroid normal to palpation  RESP: lungs clear to auscultation - no rales, rhonchi or wheezes  CV: regular rate and rhythm, normal S1 S2, no S3 or S4, no murmur, click or  rub, no peripheral edema and peripheral pulses strong    Diagnostic Test Results:  none     ASSESSMENT/PLAN:     1. Acute sinusitis with symptoms > 10 days  FU if not improving  - azithromycin (ZITHROMAX) 250 MG tablet; Two tablets first day, then one tablet daily for four days.  Dispense: 6 tablet; Refill: 0    2. White coat syndrome with hypertension  Home BPs are normal, always elevated here.         Lani Licea MD  [facility]

## 2017-12-11 NOTE — MR AVS SNAPSHOT
After Visit Summary   12/11/2017    Kate Isaac    MRN: [id number]           Patient Information     Date Of Birth          1935        Visit Information        Provider Department      12/11/2017 3:40 PM Lani Waters MD [facility]        Today's Diagnoses     Acute sinusitis with symptoms > 10 days    -  1    White coat syndrome with hypertension           Follow-ups after your visit        Who to contact     If you have questions or need follow up information about today's clinic visit or your schedule please contact [facility] directly at [phone].  Normal or non-critical lab and imaging results will be communicated to you by Payfonehart, letter or phone within 4 business days after the clinic has received the results. If you do not hear from us within 7 days, please contact the clinic through Payfonehart or phone. If you have a critical or abnormal lab result, we will notify you by phone as soon as possible.  Submit refill requests through [employer] or call your pharmacy and they will forward the refill request to us. Please allow 3 business days for your refill to be completed.          Additional Information About Your Visit        MyChart Information     [employer] gives you secure access to your electronic health record. If you see a primary care provider, you can also send messages to your care team and make appointments. If you have questions, please call your primary care clinic.  If you do not have a primary care provider, please call [phone] and they will assist you.        Care EveryWhere ID     This is your Care EveryWhere ID. This could be used by other organizations to access your [place] medical records  [id number]        Your Vitals Were     Pulse BMI (Body Mass Index)                97 24.06 kg/m2           Blood Pressure from Last 3 Encounters:   12/11/17 172/72   10/11/17 154/80   09/18/17 165/73    Weight from Last 3 Encounters:   12/11/17  138 lb (62.6 kg)   10/11/17 138 lb (62.6 kg)   09/18/17 137 lb 12.8 oz (62.5 kg)              Today, you had the following     No orders found for display         Today's Medication Changes          These changes are accurate as of: 12/11/17  5:19 PM.  If you have any questions, ask your nurse or doctor.               Start taking these medicines.        Dose/Directions    azithromycin 250 MG tablet   Commonly known as:  ZITHROMAX   Used for:  Acute sinusitis with symptoms > 10 days   Started by:  Lani Waters MD        Two tablets first day, then one tablet daily for four days.   Quantity:  6 tablet   Refills:  0         These medicines have changed or have updated prescriptions.        Dose/Directions    amLODIPine 5 MG tablet   Commonly known as:  NORVASC   This may have changed:  additional instructions   Used for:  Hypertension goal BP (blood pressure) < 140/90        TAKE 1 TABLET EVERY DAY   Quantity:  90 tablet   Refills:  3            Where to get your medicines      These medications were sent to [address] [address] [facility]  [address] [facility] [address]     Phone:  [phone]     azithromycin 250 MG tablet                Primary Care Provider Office Phone # Fax #    Lani Waters [phone] [address] [facility] [address]        Equal Access to Services     RAUL AGUILAR AH: Hadii chad ku hadasho Soomaali, waaxda luqadaha, qaybta kaalmada adeegyada, waxay jovanyin hayaan shanna colvin . So [facility] [phone].    ATENCIÓN: Si habla español, tiene a macias disposición servicios gratuitos de asistencia lingüística. Llame al [phone].    We comply with applicable federal civil rights laws and [place] laws. We do not discriminate on the basis of race, color, national origin, age, disability, sex, sexual orientation, or gender identity.            Thank you!     Thank you for choosing [facility]  for your  care. Our goal is always to provide you with excellent care. Hearing back from our patients is one way we can continue to improve our services. Please take a few minutes to complete the written survey that you may receive in the mail after your visit with us. Thank you!             Your Updated Medication List - Protect others around you: Learn how to safely use, store and throw away your medicines at www.disposemymeds.org.          This list is accurate as of: 12/11/17  5:19 PM.  Always use your most recent med list.                   Brand Name Dispense Instructions for use Diagnosis    amLODIPine 5 MG tablet    NORVASC    90 tablet    TAKE 1 TABLET EVERY DAY    Hypertension goal BP (blood pressure) < 140/90       aspirin 81 MG tablet      1 TABLET DAILY PM        azithromycin 250 MG tablet    ZITHROMAX    6 tablet    Two tablets first day, then one tablet daily for four days.    Acute sinusitis with symptoms > 10 days       Calcium carb-Vitamin D 500 mg [place]-200 units 500-200 MG-UNIT per tablet    OSCAL with D;Oyster Shell Calcium     Take 1 tablet by mouth 2 times daily (with meals)        FIBER PO      Take 2 capsules by mouth At Bedtime        FLAX PO           GLUCOSAMINE CHONDROITIN Tabs      1 twice daily        losartan 50 MG tablet    COZAAR    90 tablet    TAKE 1 TABLET EVERY DAY IN AM    Hypertension goal BP (blood pressure) < 140/90       PRESERVISION AREDS 2 Caps      One in AM and one in PM

## 2017-12-11 NOTE — NURSING NOTE
"Chief Complaint   Patient presents with     URI       Initial /87  Pulse 97  Wt 138 lb (62.6 kg)  BMI 24.06 kg/m2 Estimated body mass index is 24.06 kg/(m^2) as calculated from the following:    Height as of 8/19/17: 5' 3.5\" (1.613 m).    Weight as of this encounter: 138 lb (62.6 kg).  Medication Reconciliation: mohan Silva MA      "

## 2018-04-24 ENCOUNTER — TELEPHONE (OUTPATIENT)
Dept: FAMILY MEDICINE | Facility: CLINIC | Age: 83
End: 2018-04-24

## 2018-04-24 NOTE — TELEPHONE ENCOUNTER
Panel Management Review      Patient has the following on her problem list:     Hypertension   Last three blood pressure readings:  BP Readings from Last 3 Encounters:   12/11/17 172/72   10/11/17 154/80   09/18/17 165/73     Blood pressure: FAILED    HTN Guidelines:  Age 18-59 BP range:  Less than 140/90  Age 60-85 with Diabetes:  Less than 140/90  Age 60-85 without Diabetes:  less than 150/90      Composite cancer screening  Chart review shows that this patient is due/due soon for the following None  Summary:    Patient is due/failing the following:   BP CHECK    Action needed:   bp check     Type of outreach:    Sent StyleSaint message.    Questions for provider review:    None                                                                                                                                    Alanna Silva MA       Chart routed to Care Team .

## 2018-04-24 NOTE — LETTER
[facility]  [address] [facility] [address]  Phone: [phone]    04/24/18    Kate Isaac  [address] [facility] [address]      To whom it may concern:     Your blood pressure was elevated the last few times you were in the clinic. Please make an appointment with the ancillary nurse to have this checked. This will help better manage your hypertension.    Sincerely,      Lani Licea MD/ct

## 2018-05-14 ENCOUNTER — TRANSFERRED RECORDS (OUTPATIENT)
Dept: HEALTH INFORMATION MANAGEMENT | Facility: CLINIC | Age: 83
End: 2018-05-14

## 2018-05-14 ENCOUNTER — MYC MEDICAL ADVICE (OUTPATIENT)
Dept: FAMILY MEDICINE | Facility: CLINIC | Age: 83
End: 2018-05-14

## 2018-05-14 DIAGNOSIS — M17.12 PRIMARY OSTEOARTHRITIS OF LEFT KNEE: ICD-10-CM

## 2018-05-15 RX ORDER — MELOXICAM 15 MG/1
15 TABLET ORAL DAILY
Qty: 90 TABLET | Refills: 2 | Status: SHIPPED | OUTPATIENT
Start: 2018-05-15 | End: 2018-10-30

## 2018-05-27 ASSESSMENT — ACTIVITIES OF DAILY LIVING (ADL)
CURRENT_FUNCTION: NO ASSISTANCE NEEDED
I_NEED_ASSISTANCE_FOR_THE_FOLLOWING_DAILY_ACTIVITIES:: NO ASSISTANCE IS NEEDED

## 2018-05-29 ASSESSMENT — ACTIVITIES OF DAILY LIVING (ADL): CURRENT_FUNCTION: NO ASSISTANCE NEEDED

## 2018-05-29 NOTE — PROGRESS NOTES
SUBJECTIVE:   Kate Isaac is a 82 year old female who presents for Preventive Visit.      Are you in the first 12 months of your Medicare coverage?  No    Physical   Annual:     Getting at least 3 servings of Calcium per day::  Yes    Bi-annual eye exam::  Yes    Dental care twice a year::  Yes    Sleep apnea or symptoms of sleep apnea::  None    Diet::  Regular (no restrictions)    Frequency of exercise::  4-5 days/week    Duration of exercise::  45-60 minutes    Taking medications regularly::  Yes    Medication side effects::  None    Additional concerns today::  YES    Ability to successfully perform activities of daily living: no assistance needed  Home Safety:  Throw rugs in the hallway and lack of grab bars in the bathroom  Hearing Impairment: difficulty following a conversation in a noisy restaurant or crowded room and need to ask people to speak up or repeat themselves    Has seen hearing specialist    Fall risk:  Fallen 2 or more times in the past year?: No  Any fall with injury in the past year?: No  COGNITIVE SCREEN  1) Repeat 3 items (Banana, Sunrise, Chair)    2) Clock draw: NORMAL  3) 3 item recall: Recalls 3 objects  Results: 3 items recalled: COGNITIVE IMPAIRMENT LESS LIKELY    Mini-CogTM Copyright S Georgie. Licensed by the author for use in [facility]; reprinted with permission (soob@.[facility]). All rights reserved.        Reviewed and updated as needed this visit by clinical staff  Tobacco  Med Hx  Surg Hx  Fam Hx  Soc Hx        Reviewed and updated as needed this visit by Provider        Social History   Substance Use Topics     Smoking status: Former Smoker     Packs/day: 1.00     Years: 10.00     Types: Cigarettes     Start date: 1/1/1956     Quit date: 1/1/1985     Smokeless tobacco: Never Used      Comment: I stopped smoking several times from 1959 to 1985     Alcohol use 1.5 - 2.0 oz/week      Comment: Red wine       Alcohol Use 5/27/2018   If you drink alcohol do you  "typically have greater than 3 drinks per day OR greater than 7 drinks per week? No   No flowsheet data found.            Today's PHQ-2 Score:   PHQ-2 ( 1999 [employer]) 5/30/2018   Q1: Little interest or pleasure in doing things 0   Q2: Feeling down, depressed or hopeless 0   PHQ-2 Score 0   Q1: Little interest or pleasure in doing things -   Q2: Feeling down, depressed or hopeless -   PHQ-2 Score -       Do you feel safe in your environment - Yes    Do you have a Health Care Directive?: Yes: Advance Directive has been received and scanned.    Current providers sharing in care for this patient include:   Patient Care Team:  Lani Waters MD as PCP - General    The following health maintenance items are reviewed in Epic and correct as of today:  Health Maintenance   Topic Date Due     DEXA Q2 YR  08/12/2018     INFLUENZA VACCINE (Season Ended) 09/01/2018     COLONOSCOPY Q5 YR  09/16/2018     FALL RISK ASSESSMENT  10/11/2018     ADVANCE DIRECTIVE PLANNING Q5 YRS  08/18/2021     TETANUS IMMUNIZATION (SYSTEM ASSIGNED)  07/21/2024     PNEUMOCOCCAL  Completed     Labs reviewed in [facility]    Mammogram Screening: Patient over age 75, has elected to continue with mammography screening.    Review of Systems  Constitutional, HEENT, cardiovascular, pulmonary, gi and gu systems are negative, except as otherwise noted.    OBJECTIVE:   /72  Pulse 90  Temp 97.4  F (36.3  C) (Oral)  Resp 17  Ht 5' 3.5\" (1.613 m)  Wt 134 lb (60.8 kg)  SpO2 99%  BMI 23.36 kg/m2 Estimated body mass index is 23.36 kg/(m^2) as calculated from the following:    Height as of this encounter: 5' 3.5\" (1.613 m).    Weight as of this encounter: 134 lb (60.8 kg).  Physical Exam  GENERAL: healthy, alert and no distress  EYES: Eyes grossly normal to inspection, PERRL and conjunctivae and sclerae normal  HENT: ear canals and TM's normal, nose and mouth without ulcers or lesions  NECK: no adenopathy, no asymmetry, masses, or scars and thyroid normal to " "palpation  RESP: lungs clear to auscultation - no rales, rhonchi or wheezes  BREAST: normal without masses, tenderness or nipple discharge and no palpable axillary masses or adenopathy  CV: regular rate and rhythm, normal S1 S2, no S3 or S4, no murmur, click or rub, no peripheral edema and peripheral pulses strong  ABDOMEN: soft, nontender, no hepatosplenomegaly, no masses and bowel sounds normal  MS: no gross musculoskeletal defects noted, no edema/ sleeve on right arm for lumphedema  SKIN: no suspicious lesions or rashes  NEURO: Normal strength and tone, mentation intact and speech normal  PSYCH: mentation appears normal, affect normal/bright    ASSESSMENT / PLAN:   1. Routine general medical examination at a health care facility      2. Hypertension goal BP (blood pressure) < 140/90  At goal on meds, home BP readings are all at goal  - losartan (COZAAR) 50 MG tablet; Take 1 tablet (50 mg) by mouth daily  Dispense: 90 tablet; Refill: 1  - amLODIPine (NORVASC) 5 MG tablet; TAKE 1 TABLET EVERY DAY  Dispense: 90 tablet; Refill: 3  - Basic metabolic panel    3. Personal history of malignant neoplasm of breast  Back to routine screening    4. Osteopenia of spine  Exercise, calcium and vit D    5. CARDIOVASCULAR SCREENING; LDL GOAL LESS THAN 130    - Lipid panel reflex to direct LDL Fasting    End of Life Planning:  Patient currently has an advanced directive: Yes.  Practitioner is supportive of decision.    COUNSELING:  Reviewed preventive health counseling, as reflected in patient instructions       Regular exercise       Healthy diet/nutrition       Vision screening       Hearing screening       Dental care       Osteoporosis Prevention/Bone Health       Colon cancer screening        Estimated body mass index is 23.36 kg/(m^2) as calculated from the following:    Height as of this encounter: 5' 3.5\" (1.613 m).    Weight as of this encounter: 134 lb (60.8 kg).     reports that she quit smoking about 33 years ago. Her " smoking use included Cigarettes. She started smoking about 62 years ago. She has a 10.00 pack-year smoking history. She has never used smokeless tobacco.      Appropriate preventive services were discussed with this patient, including applicable screening as appropriate for cardiovascular disease, diabetes, osteopenia/osteoporosis, and glaucoma.  As appropriate for age/gender, discussed screening for colorectal cancer, prostate cancer, breast cancer, and cervical cancer. Checklist reviewing preventive services available has been given to the patient.    Reviewed patients plan of care and provided an AVS. The Intermediate Care Plan ( asthma action plan, low back pain action plan, and migraine action plan) for Kate meets the Care Plan requirement. This Care Plan has been established and reviewed with the Patient.    Counseling Resources:  ATP IV Guidelines  Pooled Cohorts Equation Calculator  Breast Cancer Risk Calculator  FRAX Risk Assessment  ICSI Preventive Guidelines  Dietary Guidelines for Americans, 2010  USDA's MyPlate  ASA Prophylaxis  Lung CA Screening    Lani Licea MD  [facility] [place]  Answers for HPI/ROS submitted by the patient on 5/27/2018   PHQ-2 Score: 1

## 2018-05-30 ENCOUNTER — OFFICE VISIT (OUTPATIENT)
Dept: FAMILY MEDICINE | Facility: CLINIC | Age: 83
End: 2018-05-30
Payer: COMMERCIAL

## 2018-05-30 VITALS
OXYGEN SATURATION: 99 % | DIASTOLIC BLOOD PRESSURE: 72 MMHG | BODY MASS INDEX: 22.88 KG/M2 | TEMPERATURE: 97.4 F | HEIGHT: 64 IN | SYSTOLIC BLOOD PRESSURE: 138 MMHG | HEART RATE: 90 BPM | RESPIRATION RATE: 17 BRPM | WEIGHT: 134 LBS

## 2018-05-30 DIAGNOSIS — Z00.00 ROUTINE GENERAL MEDICAL EXAMINATION AT A HEALTH CARE FACILITY: Primary | ICD-10-CM

## 2018-05-30 DIAGNOSIS — I10 HYPERTENSION GOAL BP (BLOOD PRESSURE) < 140/90: ICD-10-CM

## 2018-05-30 DIAGNOSIS — Z85.3 PERSONAL HISTORY OF MALIGNANT NEOPLASM OF BREAST: ICD-10-CM

## 2018-05-30 DIAGNOSIS — Z13.6 CARDIOVASCULAR SCREENING; LDL GOAL LESS THAN 130: ICD-10-CM

## 2018-05-30 DIAGNOSIS — M85.88 OSTEOPENIA OF SPINE: ICD-10-CM

## 2018-05-30 LAB
ANION GAP SERPL CALCULATED.3IONS-SCNC: 8 MMOL/L (ref 3–14)
BUN SERPL-MCNC: 10 MG/DL (ref 7–30)
CALCIUM SERPL-MCNC: 8.9 MG/DL (ref 8.5–10.1)
CHLORIDE SERPL-SCNC: 103 MMOL/L (ref 94–109)
CHOLEST SERPL-MCNC: 216 MG/DL
CO2 SERPL-SCNC: 29 MMOL/L (ref 20–32)
CREAT SERPL-MCNC: 0.5 MG/DL (ref 0.52–1.04)
GFR SERPL CREATININE-BSD FRML MDRD: >90 ML/MIN/1.7M2
GLUCOSE SERPL-MCNC: 96 MG/DL (ref 70–99)
HDLC SERPL-MCNC: 89 MG/DL
LDLC SERPL CALC-MCNC: 118 MG/DL
NONHDLC SERPL-MCNC: 127 MG/DL
POTASSIUM SERPL-SCNC: 4.2 MMOL/L (ref 3.4–5.3)
SODIUM SERPL-SCNC: 140 MMOL/L (ref 133–144)
TRIGL SERPL-MCNC: 43 MG/DL

## 2018-05-30 PROCEDURE — 80061 LIPID PANEL: CPT | Performed by: FAMILY MEDICINE

## 2018-05-30 PROCEDURE — G0439 PPPS, SUBSEQ VISIT: HCPCS | Performed by: FAMILY MEDICINE

## 2018-05-30 PROCEDURE — 80048 BASIC METABOLIC PNL TOTAL CA: CPT | Performed by: FAMILY MEDICINE

## 2018-05-30 PROCEDURE — 36415 COLL VENOUS BLD VENIPUNCTURE: CPT | Performed by: FAMILY MEDICINE

## 2018-05-30 RX ORDER — AMLODIPINE BESYLATE 5 MG/1
TABLET ORAL
Qty: 90 TABLET | Refills: 3 | Status: SHIPPED | OUTPATIENT
Start: 2018-05-30 | End: 2018-06-19

## 2018-05-30 RX ORDER — LOSARTAN POTASSIUM 50 MG/1
50 TABLET ORAL DAILY
Qty: 90 TABLET | Refills: 1 | Status: SHIPPED | OUTPATIENT
Start: 2018-05-30 | End: 2018-10-30

## 2018-05-30 ASSESSMENT — PAIN SCALES - GENERAL: PAINLEVEL: NO PAIN (0)

## 2018-05-30 NOTE — MR AVS SNAPSHOT
After Visit Summary   5/30/2018    Kate Isaac    MRN: [id number]           Patient Information     Date Of Birth          1935        Visit Information        Provider Department      5/30/2018 8:40 AM Lani Waters MD [facility]        Today's Diagnoses     Routine general medical examination at a health care facility    -  1    Hypertension goal BP (blood pressure) < 140/90        Personal history of malignant neoplasm of breast        Osteopenia of spine        CARDIOVASCULAR SCREENING; LDL GOAL LESS THAN 130          Care Instructions      Preventive Health Recommendations    Female Ages 65 +    Yearly exam:     See your health care provider every year in order to  o Review health changes.   o Discuss preventive care.    o Review your medicines if your doctor has prescribed any.      You no longer need a yearly Pap test unless you've had an abnormal Pap test in the past 10 years. If you have vaginal symptoms, such as bleeding or discharge, be sure to talk with your provider about a Pap test.      Every 1 to 2 years, have a mammogram.  If you are over 69, talk with your health care provider about whether or not you want to continue having screening mammograms.      Every 10 years, have a colonoscopy. Or, have a yearly FIT test (stool test). These exams will check for colon cancer.       Have a cholesterol test every 5 years, or more often if your doctor advises it.       Have a diabetes test (fasting glucose) every three years. If you are at risk for diabetes, you should have this test more often.       At age 65, have a bone density scan (DEXA) to check for osteoporosis (brittle bone disease).    Shots:    Get a flu shot each year.    Get a tetanus shot every 10 years.    Talk to your doctor about your pneumonia vaccines. There are now two you should receive - Pneumovax (PPSV 23) and Prevnar (PCV 13).    Talk to your doctor about the shingles vaccine.    Talk to your  doctor about the hepatitis B vaccine.    Nutrition:     Eat at least 5 servings of fruits and vegetables each day.      Eat whole-grain bread, whole-wheat pasta and brown rice instead of white grains and rice.      Talk to your provider about Calcium and Vitamin D.     Lifestyle    Exercise at least 150 minutes a week (30 minutes a day, 5 days a week). This will help you control your weight and prevent disease.      Limit alcohol to one drink per day.      No smoking.       Wear sunscreen to prevent skin cancer.       See your dentist twice a year for an exam and cleaning.      See your eye doctor every 1 to 2 years to screen for conditions such as glaucoma, macular degeneration and cataracts.          Follow-ups after your visit        Future tests that were ordered for you today     Open Future Orders        Priority Expected Expires Ordered    MA Screening Digital Bilateral Routine  5/30/2019 5/30/2018            Who to contact     If you have questions or need follow up information about today's clinic visit or your schedule please contact [facility] directly at [phone].  Normal or non-critical lab and imaging results will be communicated to you by Ateedat, letter or phone within 4 business days after the clinic has received the results. If you do not hear from us within 7 days, please contact the clinic through Dormzy or phone. If you have a critical or abnormal lab result, we will notify you by phone as soon as possible.  Submit refill requests through Dormzy or call your pharmacy and they will forward the refill request to us. Please allow 3 business days for your refill to be completed.          Additional Information About Your Visit        Dormzy Information     Dormzy gives you secure access to your electronic health record. If you see a primary care provider, you can also send messages to your care team and make appointments. If you have questions, please call your primary care  "clinic.  If you do not have a primary care provider, please call [phone] and they will assist you.        Care EveryWhere ID     This is your Care EveryWhere ID. This could be used by other organizations to access your [place] [facility] medical records  [id number]        Your Vitals Were     Pulse Temperature Respirations Height Pulse Oximetry BMI (Body Mass Index)    90 97.4  F (36.3  C) (Oral) 17 5' 3.5\" (1.613 m) 99% 23.36 kg/m2       Blood Pressure from Last 3 Encounters:   05/30/18 138/72   12/11/17 172/72   10/11/17 154/80    Weight from Last 3 Encounters:   05/30/18 134 lb (60.8 kg)   12/11/17 138 lb (62.6 kg)   10/11/17 138 lb (62.6 kg)              We Performed the Following     Basic metabolic panel     Lipid panel reflex to direct LDL Fasting          Today's Medication Changes          These changes are accurate as of 5/30/18  9:41 AM.  If you have any questions, ask your nurse or doctor.               These medicines have changed or have updated prescriptions.        Dose/Directions    losartan 50 MG tablet   Commonly known as:  COZAAR   This may have changed:  additional instructions   Used for:  Hypertension goal BP (blood pressure) < 140/90   Changed by:  Lani Waters MD        Dose:  50 mg   Take 1 tablet (50 mg) by mouth daily   Quantity:  90 tablet   Refills:  1         Stop taking these medicines if you haven't already. Please contact your care team if you have questions.     azithromycin 250 MG tablet   Commonly known as:  ZITHROMAX   Stopped by:  Lani Waters MD                Where to get your medicines      Some of these will need a paper prescription and others can be bought over the counter.  Ask your nurse if you have questions.     Bring a paper prescription for each of these medications     amLODIPine 5 MG tablet    losartan 50 MG tablet                Primary Care Provider Office Phone # Fax #    Lani Waters [phone] [address] [facility] " [id number]        Equal Access to Services     [facility] : Hadii aad ku hadshanetania Cliffordali, wadeyada luqrobertha, qamagdalena hubergustavochristi milian. So [facility] [phone].    ATENCIÓN: Si habla español, tiene a macias disposición servicios gratuitos de asistencia lingüística. Mario al [phone].    We comply with applicable federal civil rights laws and [place] laws. We do not discriminate on the basis of race, color, national origin, age, disability, sex, sexual orientation, or gender identity.            Thank you!     Thank you for choosing [facility]  [facility]. Our goal is always to provide you with excellent care. Hearing back from our patients is one way we can continue to improve our services. Please take a few minutes to complete the written survey that you may receive in the mail after your visit with us. Thank you!             Your Updated Medication List - Protect others around you: Learn how to safely use, store and throw away your medicines at www.disposemymeds.org.          This list is accurate as of 5/30/18  9:41 AM.  Always use your most recent med list.                   Brand Name Dispense Instructions for use Diagnosis    amLODIPine 5 MG tablet    NORVASC    90 tablet    TAKE 1 TABLET EVERY DAY    Hypertension goal BP (blood pressure) < 140/90       aspirin 81 MG tablet      1 TABLET DAILY PM        Calcium carb-Vitamin D 500 mg [place]-200 units 500-200 MG-UNIT per tablet    OSCAL with D;Oyster Shell Calcium     Take 1 tablet by mouth 2 times daily (with meals)        FIBER PO      Take 2 capsules by mouth At Bedtime        FLAX PO           GLUCOSAMINE CHONDROITIN Tabs      1 twice daily        losartan 50 MG tablet    COZAAR    90 tablet    Take 1 tablet (50 mg) by mouth daily    Hypertension goal BP (blood pressure) < 140/90       meloxicam 15 MG tablet    MOBIC    90 tablet    Take 1 tablet (15 mg) by mouth daily    Primary osteoarthritis of left  knee       PRESERVISION AREDS 2 Caps      One in AM and one in PM

## 2018-06-19 DIAGNOSIS — I10 HYPERTENSION GOAL BP (BLOOD PRESSURE) < 140/90: ICD-10-CM

## 2018-06-20 RX ORDER — AMLODIPINE BESYLATE 5 MG/1
TABLET ORAL
Qty: 90 TABLET | Refills: 3 | Status: SHIPPED | OUTPATIENT
Start: 2018-06-20 | End: 2019-05-13

## 2018-07-20 ENCOUNTER — MYC MEDICAL ADVICE (OUTPATIENT)
Dept: FAMILY MEDICINE | Facility: CLINIC | Age: 83
End: 2018-07-20

## 2018-07-24 ENCOUNTER — TRANSFERRED RECORDS (OUTPATIENT)
Dept: HEALTH INFORMATION MANAGEMENT | Facility: CLINIC | Age: 83
End: 2018-07-24

## 2018-08-22 ENCOUNTER — TRANSFERRED RECORDS (OUTPATIENT)
Dept: HEALTH INFORMATION MANAGEMENT | Facility: CLINIC | Age: 83
End: 2018-08-22

## 2018-08-27 ENCOUNTER — MYC MEDICAL ADVICE (OUTPATIENT)
Dept: FAMILY MEDICINE | Facility: CLINIC | Age: 83
End: 2018-08-27

## 2018-08-27 NOTE — TELEPHONE ENCOUNTER
"See patient mychart message.  Requesting order for her colonoscopy; chart reflects is due in September.  She states has to be done in hospital due to her \"advanced age\".  Last done through [facility].  Martha Mccray RN      "

## 2018-10-30 ENCOUNTER — MYC MEDICAL ADVICE (OUTPATIENT)
Dept: FAMILY MEDICINE | Facility: CLINIC | Age: 83
End: 2018-10-30

## 2018-10-30 DIAGNOSIS — M17.12 PRIMARY OSTEOARTHRITIS OF LEFT KNEE: ICD-10-CM

## 2018-10-30 DIAGNOSIS — I10 HYPERTENSION GOAL BP (BLOOD PRESSURE) < 140/90: ICD-10-CM

## 2018-10-30 RX ORDER — LOSARTAN POTASSIUM 50 MG/1
50 TABLET ORAL DAILY
Qty: 90 TABLET | Refills: 1 | Status: SHIPPED | OUTPATIENT
Start: 2018-10-30 | End: 2019-05-15

## 2018-10-30 NOTE — TELEPHONE ENCOUNTER
PCP did physical the end of May and labs were normal. BP at goal.   No mention on next follow up appointment.     Routing to PCP to advise if patient due now or 1 year from her last appointment.     KERRIE Goldman RN

## 2018-10-30 NOTE — TELEPHONE ENCOUNTER
Losartan prescription approved per [facility] Refill Protocol.  Amlodipine has valid refills through 6/2019.  Meloxicam routed to provider due to pt does not meet refill parameters for RN to approve.    Yaquelin Aguero RN

## 2018-10-30 NOTE — TELEPHONE ENCOUNTER
Routing refill request to provider for review/approval because:  Labs not current:  See below  Pt does not meet age parameters for RN refill of meloxicam.      Requested Prescriptions   Pending Prescriptions Disp Refills     meloxicam (MOBIC) 15 MG tablet [Pharmacy Med Name: MELOXICAM 15 MG Tablet] 90 tablet 2     Sig: TAKE 1 TABLET EVERY DAY    NSAID Medications Failed    10/30/2018 10:29 AM       Failed - Normal ALT on file in past 12 months    Recent Labs   Lab Test  05/18/16   0901   ALT  21          Failed - Normal AST on file in past 12 months    Recent Labs   Lab Test  05/18/16   0901   AST  18          Failed - Patient is age 6-64 years       Failed - Normal CBC on file in past 12 months    Recent Labs   Lab Test  10/10/16   1201  10/07/16   0911   WBC   --   6.0   RBC   --   4.59   HGB  13.1  12.8   HCT   --   40.1   PLT   --   344          Failed - Normal serum creatinine on file in past 12 months    Recent Labs   Lab Test  05/30/18   0925   CR  0.50*          Passed - Blood pressure under 140/90 in past 12 months    BP Readings from Last 3 Encounters:   05/30/18 138/72   12/11/17 172/72   10/11/17 154/80

## 2018-10-31 RX ORDER — MELOXICAM 15 MG/1
TABLET ORAL
Qty: 90 TABLET | Refills: 1 | Status: SHIPPED | OUTPATIENT
Start: 2018-10-31 | End: 2019-05-13

## 2018-11-17 ENCOUNTER — HEALTH MAINTENANCE LETTER (OUTPATIENT)
Age: 83
End: 2018-11-17

## 2019-01-25 ENCOUNTER — MEDICAL CORRESPONDENCE (OUTPATIENT)
Dept: HEALTH INFORMATION MANAGEMENT | Facility: CLINIC | Age: 84
End: 2019-01-25

## 2019-01-30 ENCOUNTER — TELEPHONE (OUTPATIENT)
Dept: ONCOLOGY | Facility: CLINIC | Age: 84
End: 2019-01-30

## 2019-01-30 NOTE — TELEPHONE ENCOUNTER
ONCOLOGY INTAKE: Records Information      APPT INFORMATION:  Referring provider:  Dr. 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 No - provided fax# for PT to give to referring clinic    Has patient been seen for any external appt for this diagnosis (enter clinic/location)? Per PT   Records(surgery) and Bx @ [facility] Dr Srini Mckeon [address] [place]  Additional Bx @ [facility] [employer]  No Scans    SULEMAN sent to PT for signature

## 2019-01-31 NOTE — TELEPHONE ENCOUNTER
RECORDS STATUS - ALL OTHER DIAGNOSIS      RECORDS RECEIVED FROM: [facility],    DATE RECEIVED:    NOTES STATUS DETAILS   OFFICE NOTE from referring provider Dr. Jimmy Mckeon Faxed to HIM   OFFICE NOTE from medical oncologist     DISCHARGE SUMMARY from hospital     DISCHARGE REPORT from the ER     OPERATIVE REPORT     MEDICATION LIST  Faxed to HIM   CLINICAL TRIAL TREATMENTS TO DATE     LABS     PATHOLOGY REPORTS 1/23/19, 12/18/18, 12/19/17, 5/9/17 ([facility], Report & Slides Requested)  [facility] Bx (Requested) Faxed to HIM   ANYTHING RELATED TO DIAGNOSIS     GENONOMIC TESTING     TYPE:     IMAGING (NEED IMAGES & REPORT)     CT SCANS     MRI     MAMMO     ULTRASOUND     PET

## 2019-01-31 NOTE — TELEPHONE ENCOUNTER
Guerrero w/ Damien @ [facility]/Dr. Mckeon office (Ph: [phone]) she is faxing over roughly 50 pages of Med Rec/Bx Reports. She tried to put me through to Derm Pathology for Slides request, however they were closed. Request faxed attn: [facility] to [phone].  2:54 PM

## 2019-01-31 NOTE — TELEPHONE ENCOUNTER
Jenny from [facility] called, and stated she would try to get the slides gathered & sent out tomorrow. Jenny stated she would call tomorrow to f/u.  3:20 PM

## 2019-02-04 ENCOUNTER — ONCOLOGY VISIT (OUTPATIENT)
Dept: ONCOLOGY | Facility: CLINIC | Age: 84
End: 2019-02-04
Attending: INTERNAL MEDICINE
Payer: COMMERCIAL

## 2019-02-04 ENCOUNTER — PRE VISIT (OUTPATIENT)
Dept: ONCOLOGY | Facility: CLINIC | Age: 84
End: 2019-02-04

## 2019-02-04 VITALS
HEART RATE: 93 BPM | OXYGEN SATURATION: 98 % | WEIGHT: 137 LBS | DIASTOLIC BLOOD PRESSURE: 85 MMHG | BODY MASS INDEX: 23.39 KG/M2 | HEIGHT: 64 IN | SYSTOLIC BLOOD PRESSURE: 159 MMHG | TEMPERATURE: 97.2 F

## 2019-02-04 DIAGNOSIS — C49.9 SARCOMA OF SOFT TISSUE (H): Primary | ICD-10-CM

## 2019-02-04 PROCEDURE — 00000346 ZZHCL STATISTIC REVIEW OUTSIDE SLIDES TC 88321: Performed by: INTERNAL MEDICINE

## 2019-02-04 PROCEDURE — G0463 HOSPITAL OUTPT CLINIC VISIT: HCPCS | Mod: ZF

## 2019-02-04 PROCEDURE — 99205 OFFICE O/P NEW HI 60 MIN: CPT | Mod: ZP | Performed by: INTERNAL MEDICINE

## 2019-02-04 ASSESSMENT — PAIN SCALES - GENERAL: PAINLEVEL: NO PAIN (0)

## 2019-02-04 ASSESSMENT — MIFFLIN-ST. JEOR: SCORE: 1063.49

## 2019-02-04 NOTE — PROGRESS NOTES
[facility]  MEDICAL ONCOLOGY CONSULT  Feb 4, 2019    CHIEF COMPLAINT: History of basal cell carcinomas and new diagnosis of a cutaneous leiomyosarcoma    HISTORY OF PRESENT ILLNESS  Kate Isaac is a 83 year old female with a history of ER+ breast cancer in 1996 s/p lumpectomy and axillary lymph node dissection and radiation therapy s/p adjuvant Tamoxifen and right sided lymphedema. She also has history of several basal cell carcinomas, and a new diagnosis of a cutaneous leiomyosarcoma. On 12/18/19 she had a biopsy of a right leg lesion, which was originally called a dermal spindle cell neoplasm.     On 1/24/19, she had the first Moh's surgery. A second Moh's surgery on 1/25/19 was performed for additional margins. Was subsequently told it was completely excised. Pathology returned as a leiomyosarcoma, this has not yet been reviewed internally.    Lifelong sun exposure and has been using sunscreen for >30 years. No history of indoor tanning. She denies any systemic signs of illness. No fevers or chills. No cough, shortness of breath, chest pain. No nausea, vomiting, diarrhea, or abdominal pain. She is in good general health.     No labs today, but most recent testing in May 2018 showing creatinine 0.8 mg/dL. Performance status is 0.      Results for orders placed or performed in visit on 05/30/18   Basic metabolic panel   Result Value Ref Range    Sodium 140 133 - 144 mmol/L    Potassium 4.2 3.4 - 5.3 mmol/L    Chloride 103 94 - 109 mmol/L    Carbon Dioxide 29 20 - 32 mmol/L    Anion Gap 8 3 - 14 mmol/L    Glucose 96 70 - 99 mg/dL    Urea Nitrogen 10 7 - 30 mg/dL    Creatinine 0.50 (L) 0.52 - 1.04 mg/dL    GFR Estimate >90 >60 mL/min/1.7m2    GFR Estimate If Black >90 >60 mL/min/1.7m2    Calcium 8.9 8.5 - 10.1 mg/dL   Lipid panel reflex to direct LDL Fasting   Result Value Ref Range    Cholesterol 216 (H) <200 mg/dL    Triglycerides 43 <150 mg/dL    HDL Cholesterol 89 >49 mg/dL    LDL Cholesterol  Calculated 118 (H) <100 mg/dL    Non HDL Cholesterol 127 <130 mg/dL         REVIEW OF SYSTEMS  A 12-point ROS negative except as in HPI    Current Outpatient Medications   Medication Sig Dispense Refill     amLODIPine (NORVASC) 5 MG tablet TAKE 1 TABLET EVERY DAY 90 tablet 3     ASPIRIN 81 MG OR TABS 1 TABLET DAILY PM       FIBER PO Take 2 capsules by mouth At Bedtime        Flaxseed, Linseed, (FLAX PO)        GLUCOSAMINE CHONDROITIN OR TABS 1 twice daily       losartan (COZAAR) 50 MG tablet Take 1 tablet (50 mg) by mouth daily 90 tablet 1     meloxicam (MOBIC) 15 MG tablet TAKE 1 TABLET EVERY DAY 90 tablet 1     Multiple Vitamins-Minerals (PRESERVISION AREDS 2) CAPS One in AM and one in PM       calcium carb 1250 mg, 500 mg [place],/vitamin D 200 units (OSCAL WITH D) 500-200 MG-UNIT per tablet Take 1 tablet by mouth 2 times daily (with meals)         Allergies   Allergen Reactions     Ceftriaxone Swelling     Sulfa Drugs Rash     Lisinopril Fatigue     Immunization History   Administered Date(s) Administered     HEPA 06/29/2009, 12/30/2009     HepB 06/29/2009, 08/10/2009, 12/30/2009     Influenza (High Dose) 3 valent vaccine 09/28/2012, 10/22/2015, 10/07/2016     Influenza (IIV3) PF 10/15/2013, 10/29/2014     Pneumo Conj 13-V (2010&after) 01/05/2015     Pneumococcal 23 valent 01/01/2005     TD (ADULT, 7+) 03/03/1993, 06/07/2004, 07/21/2014     Zoster vaccine, live 06/13/2007       Past Medical History:   Diagnosis Date     Arthritis 02/01/2014    Per pt had it since FEB     Basal cell cancer     sees dermatologist     Breast cancer (H) 1996    invasive ductal     HTN      Osteopenia      Recurrent UTI        Past Surgical History:   Procedure Laterality Date     BIOPSY  1996 2004,2010    basal cell cancers     BUNIONECTOMY RT/LT       CL AFF SURGICAL PATHOLOGY      ganglion cyst removal     COLONOSCOPY       GENITOURINARY SURGERY       HC EXCISION BREAST LESION, OPEN >=1  1996    right breast     SLING TRANSVAGINAL   "12/9/2013    Procedure: SLING TRANSVAGINAL;  Cysto with TVT;  Surgeon: Denia Shultz MD;  Location: [facility] OR     SURGICAL HISTORY OF -   1959    right thigh tumor removal/benign       SOCIAL HISTORY  History   Smoking Status     Former Smoker     Packs/day: 1.00     Years: 10.00     Types: Cigarettes     Start date: 1/1/1956     Quit date: 1/1/1985   Smokeless Tobacco     Never Used     Comment: I stopped smoking several times from 1959 to 1985    Social History    Substance and Sexual Activity      Alcohol use: Yes        Alcohol/week: 1.5 - 2.0 oz        Comment: Red wine     History   Drug Use No       FAMILY HISTORY  Family History   Problem Relation Age of Onset     Heart Disease Mother         cad at age 70s     Cancer Mother         KIDNEY     Diabetes Mother      Coronary Artery Disease Mother      Hypertension Mother      Heart Disease Father         chf     Neurologic Disorder Father         PARKINSONS     Alzheimer Disease Father      Heart Disease Maternal Grandmother         chf     Coronary Artery Disease Maternal Grandmother      Cancer Maternal Grandfather         ?     Heart Disease Paternal Grandmother      Coronary Artery Disease Paternal Grandmother      Heart Disease Paternal Grandfather      Neurologic Disorder Paternal Grandfather         PARKINSONS     Blood Disease Brother         LEUKEMIA     Breast Cancer Other      Diabetes Other         Mother's sister's daughter     Cancer Daughter         CERVICAL     Cancer Other         THYROID     Cerebrovascular Disease Other      Breast Cancer Daughter        PHYSICAL EXAMINATION  /85   Pulse 93   Temp 97.2  F (36.2  C) (Oral)   Ht 1.613 m (5' 3.5\")   Wt 62.1 kg (137 lb)   SpO2 98%   Breastfeeding? No   BMI 23.89 kg/m    Wt Readings from Last 2 Encounters:   02/04/19 62.1 kg (137 lb)   05/30/18 60.8 kg (134 lb)     Physical Exam   Constitutional: She is oriented to person, place, and time. She appears well-developed and " well-nourished.   HENT:   Mouth/Throat: Oropharynx is clear and moist.   Eyes: Conjunctivae and EOM are normal. Pupils are equal, round, and reactive to light.   Neck: Normal range of motion.   Cardiovascular: Regular rhythm.   No murmur heard.  Pulmonary/Chest: Effort normal and breath sounds normal. No respiratory distress. She has no wheezes.   Abdominal: Soft. Bowel sounds are normal. She exhibits no distension.   Musculoskeletal: Normal range of motion. She exhibits no edema or tenderness.   Lymphadenopathy:     She has no cervical adenopathy.   Neurological: She is alert and oriented to person, place, and time. No cranial nerve deficit.   Skin: Skin is warm and dry. No pallor.   Right lower leg incision with sutures in place. No evidence of nodular recurrence.   Psychiatric: She has a normal mood and affect. Her behavior is normal.   Nursing note and vitals reviewed.      ASSESSMENT AND PLAN  #1 Dermal leiomyosarcoma, right lower leg, pending independent pathologic review  #2 History of basal cell carcinomas  It was a pleasure to meet Mrs. Isaac today. She is a very pleasant 83 year old woman with a recent diagnosis of right lower extremity dermal leiomyosarcoma. Unfortunately, I am unable to confirm pathology and I do not have external pathology reports for review. However, per report there were two independent pathologic reviews performed by [facility], who agreed with the diagnosis.    We reviewed the diagnosis of dermal sarcoma and the importance of pathologic data in the prognostication of the disease. Data elements such as size of the lesion, number of mitoses, grade of the sarcoma, and lymphovascular and perineural invasion will need to be reviewed.    Plan is as follows:  1. Pathology review and possible NGS and FoundationOne testing  2. CT-CAP for staging evaluation  3. CBC and CMP laboratory testing  4. Return to clinic in 2 weeks for review.    Patient agrees to proceed with the plan  as outlined above. Multiple questions answered.    Angelica Townsend M.D.   of Medicine  Hematology, Oncology and Transplantation      Sarcoma of soft tissue (H)  - CBC with platelets differential  - Comprehensive metabolic panel  - CT Chest Abdomen Pelvis w/o & w Contrast

## 2019-02-04 NOTE — LETTER
2/4/2019       RE: Kate Isaac  [address] [id number]     Dear Colleague,    Thank you for referring your patient, Kate Isaac, to the [facility]. Please see a copy of my visit note below.    [facility]  MEDICAL ONCOLOGY CONSULT  Feb 4, 2019    CHIEF COMPLAINT: History of basal cell carcinomas and new diagnosis of a cutaneous leiomyosarcoma    HISTORY OF PRESENT ILLNESS  Kate Isaac is a 83 year old female with a history of ER+ breast cancer in 1996 s/p lumpectomy and axillary lymph node dissection and radiation therapy s/p adjuvant Tamoxifen and right sided lymphedema. She also has history of several basal cell carcinomas, and a new diagnosis of a cutaneous leiomyosarcoma. On 12/18/19 she had a biopsy of a right leg lesion, which was originally called a dermal spindle cell neoplasm.     On 1/24/19, she had the first Moh's surgery. A second Moh's surgery on 1/25/19 was performed for additional margins. Was subsequently told it was completely excised. Pathology returned as a leiomyosarcoma, this has not yet been reviewed internally.    Lifelong sun exposure and has been using sunscreen for >30 years. No history of indoor tanning. She denies any systemic signs of illness. No fevers or chills. No cough, shortness of breath, chest pain. No nausea, vomiting, diarrhea, or abdominal pain. She is in good general health.     No labs today, but most recent testing in May 2018 showing creatinine 0.8 mg/dL. Performance status is 0.      Results for orders placed or performed in visit on 05/30/18   Basic metabolic panel   Result Value Ref Range    Sodium 140 133 - 144 mmol/L    Potassium 4.2 3.4 - 5.3 mmol/L    Chloride 103 94 - 109 mmol/L    Carbon Dioxide 29 20 - 32 mmol/L    Anion Gap 8 3 - 14 mmol/L    Glucose 96 70 - 99 mg/dL    Urea Nitrogen 10 7 - 30 mg/dL    Creatinine 0.50 (L) 0.52 - 1.04 mg/dL    GFR Estimate >90 >60 mL/min/1.7m2    GFR Estimate If Black >90  The patient is a 73y Male complaining of depression. >60 mL/min/1.7m2    Calcium 8.9 8.5 - 10.1 mg/dL   Lipid panel reflex to direct LDL Fasting   Result Value Ref Range    Cholesterol 216 (H) <200 mg/dL    Triglycerides 43 <150 mg/dL    HDL Cholesterol 89 >49 mg/dL    LDL Cholesterol Calculated 118 (H) <100 mg/dL    Non HDL Cholesterol 127 <130 mg/dL         REVIEW OF SYSTEMS  A 12-point ROS negative except as in HPI    Current Outpatient Medications   Medication Sig Dispense Refill     amLODIPine (NORVASC) 5 MG tablet TAKE 1 TABLET EVERY DAY 90 tablet 3     ASPIRIN 81 MG OR TABS 1 TABLET DAILY PM       FIBER PO Take 2 capsules by mouth At Bedtime        Flaxseed, Linseed, (FLAX PO)        GLUCOSAMINE CHONDROITIN OR TABS 1 twice daily       losartan (COZAAR) 50 MG tablet Take 1 tablet (50 mg) by mouth daily 90 tablet 1     meloxicam (MOBIC) 15 MG tablet TAKE 1 TABLET EVERY DAY 90 tablet 1     Multiple Vitamins-Minerals (PRESERVISION AREDS 2) CAPS One in AM and one in PM       calcium carb 1250 mg, 500 mg [place],/vitamin D 200 units (OSCAL WITH D) 500-200 MG-UNIT per tablet Take 1 tablet by mouth 2 times daily (with meals)         Allergies   Allergen Reactions     Ceftriaxone Swelling     Sulfa Drugs Rash     Lisinopril Fatigue     Immunization History   Administered Date(s) Administered     HEPA 06/29/2009, 12/30/2009     HepB 06/29/2009, 08/10/2009, 12/30/2009     Influenza (High Dose) 3 valent vaccine 09/28/2012, 10/22/2015, 10/07/2016     Influenza (IIV3) PF 10/15/2013, 10/29/2014     Pneumo Conj 13-V (2010&after) 01/05/2015     Pneumococcal 23 valent 01/01/2005     TD (ADULT, 7+) 03/03/1993, 06/07/2004, 07/21/2014     Zoster vaccine, live 06/13/2007       Past Medical History:   Diagnosis Date     Arthritis 02/01/2014    Per pt had it since FEB     Basal cell cancer     sees dermatologist     Breast cancer (H) 1996    invasive ductal     HTN      Osteopenia      Recurrent UTI        Past Surgical History:   Procedure Laterality Date     BIOPSY  1996 2004,2010     "basal cell cancers     BUNIONECTOMY RT/LT       CL AFF SURGICAL PATHOLOGY      ganglion cyst removal     COLONOSCOPY       GENITOURINARY SURGERY       HC EXCISION BREAST LESION, OPEN >=1  1996    right breast     SLING TRANSVAGINAL  12/9/2013    Procedure: SLING TRANSVAGINAL;  Cysto with TVT;  Surgeon: Denia Shultz MD;  Location: [facility] OR     SURGICAL HISTORY OF -   1959    right thigh tumor removal/benign       SOCIAL HISTORY  History   Smoking Status     Former Smoker     Packs/day: 1.00     Years: 10.00     Types: Cigarettes     Start date: 1/1/1956     Quit date: 1/1/1985   Smokeless Tobacco     Never Used     Comment: I stopped smoking several times from 1959 to 1985    Social History    Substance and Sexual Activity      Alcohol use: Yes        Alcohol/week: 1.5 - 2.0 oz        Comment: Red wine     History   Drug Use No       FAMILY HISTORY  Family History   Problem Relation Age of Onset     Heart Disease Mother         cad at age 70s     Cancer Mother         KIDNEY     Diabetes Mother      Coronary Artery Disease Mother      Hypertension Mother      Heart Disease Father         chf     Neurologic Disorder Father         PARKINSONS     Alzheimer Disease Father      Heart Disease Maternal Grandmother         chf     Coronary Artery Disease Maternal Grandmother      Cancer Maternal Grandfather         ?     Heart Disease Paternal Grandmother      Coronary Artery Disease Paternal Grandmother      Heart Disease Paternal Grandfather      Neurologic Disorder Paternal Grandfather         PARKINSONS     Blood Disease Brother         LEUKEMIA     Breast Cancer Other      Diabetes Other         Mother's sister's daughter     Cancer Daughter         CERVICAL     Cancer Other         THYROID     Cerebrovascular Disease Other      Breast Cancer Daughter        PHYSICAL EXAMINATION  /85   Pulse 93   Temp 97.2  F (36.2  C) (Oral)   Ht 1.613 m (5' 3.5\")   Wt 62.1 kg (137 lb)   SpO2 98%   Breastfeeding? No   " BMI 23.89 kg/m     Wt Readings from Last 2 Encounters:   02/04/19 62.1 kg (137 lb)   05/30/18 60.8 kg (134 lb)     Physical Exam   Constitutional: She is oriented to person, place, and time. She appears well-developed and well-nourished.   HENT:   Mouth/Throat: Oropharynx is clear and moist.   Eyes: Conjunctivae and EOM are normal. Pupils are equal, round, and reactive to light.   Neck: Normal range of motion.   Cardiovascular: Regular rhythm.   No murmur heard.  Pulmonary/Chest: Effort normal and breath sounds normal. No respiratory distress. She has no wheezes.   Abdominal: Soft. Bowel sounds are normal. She exhibits no distension.   Musculoskeletal: Normal range of motion. She exhibits no edema or tenderness.   Lymphadenopathy:     She has no cervical adenopathy.   Neurological: She is alert and oriented to person, place, and time. No cranial nerve deficit.   Skin: Skin is warm and dry. No pallor.   Right lower leg incision with sutures in place. No evidence of nodular recurrence.   Psychiatric: She has a normal mood and affect. Her behavior is normal.   Nursing note and vitals reviewed.      ASSESSMENT AND PLAN  #1 Dermal leiomyosarcoma, right lower leg, pending independent pathologic review  #2 History of basal cell carcinomas  It was a pleasure to meet Mrs. Isaac today. She is a very pleasant 83 year old woman with a recent diagnosis of right lower extremity dermal leiomyosarcoma. Unfortunately, I am unable to confirm pathology and I do not have external pathology reports for review. However, per report there were two independent pathologic reviews performed by [facility], who agreed with the diagnosis.    We reviewed the diagnosis of dermal sarcoma and the importance of pathologic data in the prognostication of the disease. Data elements such as size of the lesion, number of mitoses, grade of the sarcoma, and lymphovascular and perineural invasion will need to be reviewed.    Plan is as  follows:  1. Pathology review and possible NGS and FoundationOne testing  2. CT-CAP for staging evaluation  3. CBC and CMP laboratory testing  4. Return to clinic in 2 weeks for review.    Patient agrees to proceed with the plan as outlined above. Multiple questions answered.    Angelica Townsend M.D.   of Medicine  Hematology, Oncology and Transplantation      Sarcoma of soft tissue (H)  - CBC with platelets differential  - Comprehensive metabolic panel  - CT Chest Abdomen Pelvis w/o & w Contrast          Again, thank you for allowing me to participate in the care of your patient.      Sincerely,    Angelica Durand MD

## 2019-02-22 ENCOUNTER — ANCILLARY PROCEDURE (OUTPATIENT)
Dept: CT IMAGING | Facility: CLINIC | Age: 84
End: 2019-02-22
Payer: COMMERCIAL

## 2019-02-22 ENCOUNTER — APPOINTMENT (OUTPATIENT)
Dept: LAB | Facility: CLINIC | Age: 84
End: 2019-02-22
Attending: INTERNAL MEDICINE
Payer: COMMERCIAL

## 2019-02-22 ENCOUNTER — ONCOLOGY VISIT (OUTPATIENT)
Dept: ONCOLOGY | Facility: CLINIC | Age: 84
End: 2019-02-22
Attending: INTERNAL MEDICINE
Payer: COMMERCIAL

## 2019-02-22 VITALS
HEIGHT: 64 IN | HEART RATE: 82 BPM | TEMPERATURE: 98.1 F | DIASTOLIC BLOOD PRESSURE: 88 MMHG | RESPIRATION RATE: 14 BRPM | SYSTOLIC BLOOD PRESSURE: 159 MMHG | OXYGEN SATURATION: 98 % | BODY MASS INDEX: 23.35 KG/M2 | WEIGHT: 136.8 LBS

## 2019-02-22 DIAGNOSIS — C49.9 SARCOMA OF SOFT TISSUE (H): ICD-10-CM

## 2019-02-22 LAB
ALBUMIN SERPL-MCNC: 3.7 G/DL (ref 3.4–5)
ALP SERPL-CCNC: 69 U/L (ref 40–150)
ALT SERPL W P-5'-P-CCNC: 17 U/L (ref 0–50)
ANION GAP SERPL CALCULATED.3IONS-SCNC: 7 MMOL/L (ref 3–14)
AST SERPL W P-5'-P-CCNC: 17 U/L (ref 0–45)
BASOPHILS # BLD AUTO: 0.1 10E9/L (ref 0–0.2)
BASOPHILS NFR BLD AUTO: 1.3 %
BILIRUB SERPL-MCNC: 0.6 MG/DL (ref 0.2–1.3)
BUN SERPL-MCNC: 13 MG/DL (ref 7–30)
CALCIUM SERPL-MCNC: 8.2 MG/DL (ref 8.5–10.1)
CHLORIDE SERPL-SCNC: 102 MMOL/L (ref 94–109)
CO2 SERPL-SCNC: 26 MMOL/L (ref 20–32)
COPATH REPORT: NORMAL
CREAT BLD-MCNC: 0.6 MG/DL (ref 0.52–1.04)
CREAT SERPL-MCNC: 0.49 MG/DL (ref 0.52–1.04)
DIFFERENTIAL METHOD BLD: NORMAL
EOSINOPHIL # BLD AUTO: 0.3 10E9/L (ref 0–0.7)
EOSINOPHIL NFR BLD AUTO: 4.2 %
ERYTHROCYTE [DISTWIDTH] IN BLOOD BY AUTOMATED COUNT: 12.6 % (ref 10–15)
GFR SERPL CREATININE-BSD FRML MDRD: >90 ML/MIN/{1.73_M2}
GFR SERPL CREATININE-BSD FRML MDRD: >90 ML/MIN/{1.73_M2}
GLUCOSE SERPL-MCNC: 84 MG/DL (ref 70–99)
HCT VFR BLD AUTO: 39.3 % (ref 35–47)
HGB BLD-MCNC: 12.6 G/DL (ref 11.7–15.7)
IMM GRANULOCYTES # BLD: 0 10E9/L (ref 0–0.4)
IMM GRANULOCYTES NFR BLD: 0.1 %
LYMPHOCYTES # BLD AUTO: 1.2 10E9/L (ref 0.8–5.3)
LYMPHOCYTES NFR BLD AUTO: 18.5 %
MCH RBC QN AUTO: 28.5 PG (ref 26.5–33)
MCHC RBC AUTO-ENTMCNC: 32.1 G/DL (ref 31.5–36.5)
MCV RBC AUTO: 89 FL (ref 78–100)
MONOCYTES # BLD AUTO: 0.6 10E9/L (ref 0–1.3)
MONOCYTES NFR BLD AUTO: 8.2 %
NEUTROPHILS # BLD AUTO: 4.6 10E9/L (ref 1.6–8.3)
NEUTROPHILS NFR BLD AUTO: 67.7 %
NRBC # BLD AUTO: 0 10*3/UL
NRBC BLD AUTO-RTO: 0 /100
PLATELET # BLD AUTO: 297 10E9/L (ref 150–450)
POTASSIUM SERPL-SCNC: 4 MMOL/L (ref 3.4–5.3)
PROT SERPL-MCNC: 7.2 G/DL (ref 6.8–8.8)
RBC # BLD AUTO: 4.42 10E12/L (ref 3.8–5.2)
SODIUM SERPL-SCNC: 136 MMOL/L (ref 133–144)
WBC # BLD AUTO: 6.7 10E9/L (ref 4–11)

## 2019-02-22 PROCEDURE — 80053 COMPREHEN METABOLIC PANEL: CPT | Performed by: INTERNAL MEDICINE

## 2019-02-22 PROCEDURE — 99215 OFFICE O/P EST HI 40 MIN: CPT | Mod: ZP | Performed by: INTERNAL MEDICINE

## 2019-02-22 PROCEDURE — 85025 COMPLETE CBC W/AUTO DIFF WBC: CPT | Performed by: INTERNAL MEDICINE

## 2019-02-22 PROCEDURE — G0463 HOSPITAL OUTPT CLINIC VISIT: HCPCS | Mod: ZF

## 2019-02-22 RX ORDER — IOPAMIDOL 755 MG/ML
84 INJECTION, SOLUTION INTRAVASCULAR ONCE
Status: COMPLETED | OUTPATIENT
Start: 2019-02-22 | End: 2019-02-22

## 2019-02-22 RX ADMIN — IOPAMIDOL 84 ML: 755 INJECTION, SOLUTION INTRAVASCULAR at 12:21

## 2019-02-22 ASSESSMENT — PAIN SCALES - GENERAL: PAINLEVEL: NO PAIN (0)

## 2019-02-22 ASSESSMENT — MIFFLIN-ST. JEOR: SCORE: 1062.65

## 2019-02-22 NOTE — NURSING NOTE
Chief Complaint   Patient presents with     Blood Draw     Pt is here for labs and vitals      Pt came with an IV from her CT. I removed this at her request.   Yulissa Beck, MA

## 2019-02-22 NOTE — PROGRESS NOTES
"[facility]  MEDICAL ONCOLOGY PROGRESS NOTE  Feb 22, 2019    CHIEF COMPLAINT: History of basal cell carcinomas and new diagnosis of a cutaneous leiomyosarcoma    Oncologic history:  1. History of ER+ breast cancer in 1996 s/p lumpectomy and axillary lymph node dissection and radiation therapy s/p adjuvant Tamoxifen and right sided lymphedema.  2. 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 1/24/19 - had first layer of \"slow Mohs\" surgery  4. 1/25/19 - had second layer of \"slow Moh's\" surgery performed for additional margins, pathology returned as leiomyosarcoma (awaiting internal review)    HISTORY OF PRESENT ILLNESS  Kate Isaac is a 83 year old female with a history of ER+ breast cancer in 1996 s/p lumpectomy and axillary lymph node dissection and radiation therapy s/p adjuvant Tamoxifen and right sided lymphedema. She also has history of several basal cell carcinomas, and a new diagnosis of a cutaneous leiomyosarcoma.    Last evaluated on 2/4/19 at which time we were awaiting pathology review. She returns today in routine follow-up with staging CT and labs. Since her last visit, has been doing well. Right leg lesion healing up, feels good. No concerns. Also had a spot on her left shin frozen. Otherwise in usual state of health. No fevers, chills, weight loss. No cough, shortness of breath, abdominal pain, nausea, vomiting, diarrhea. Occasional night sweats on/off menopause, not significantly changed recently.    Performance status is 0.      Results for orders placed or performed in visit on 02/22/19   Comprehensive metabolic panel   Result Value Ref Range    Sodium 136 133 - 144 mmol/L    Potassium 4.0 3.4 - 5.3 mmol/L    Chloride 102 94 - 109 mmol/L    Carbon Dioxide 26 20 - 32 mmol/L    Anion Gap 7 3 - 14 mmol/L    Glucose 84 70 - 99 mg/dL    Urea Nitrogen 13 7 - 30 mg/dL    Creatinine 0.49 (L) 0.52 - 1.04 mg/dL    GFR Estimate >90 >60 " mL/min/[1.73_m2]    GFR Estimate If Black >90 >60 mL/min/[1.73_m2]    Calcium 8.2 (L) 8.5 - 10.1 mg/dL    Bilirubin Total 0.6 0.2 - 1.3 mg/dL    Albumin 3.7 3.4 - 5.0 g/dL    Protein Total 7.2 6.8 - 8.8 g/dL    Alkaline Phosphatase 69 40 - 150 U/L    ALT 17 0 - 50 U/L    AST 17 0 - 45 U/L   CBC with platelets differential   Result Value Ref Range    WBC 6.7 4.0 - 11.0 10e9/L    RBC Count 4.42 3.8 - 5.2 10e12/L    Hemoglobin 12.6 11.7 - 15.7 g/dL    Hematocrit 39.3 35.0 - 47.0 %    MCV 89 78 - 100 fl    MCH 28.5 26.5 - 33.0 pg    MCHC 32.1 31.5 - 36.5 g/dL    RDW 12.6 10.0 - 15.0 %    Platelet Count 297 150 - 450 10e9/L    Diff Method Automated Method     % Neutrophils 67.7 %    % Lymphocytes 18.5 %    % Monocytes 8.2 %    % Eosinophils 4.2 %    % Basophils 1.3 %    % Immature Granulocytes 0.1 %    Nucleated RBCs 0 0 /100    Absolute Neutrophil 4.6 1.6 - 8.3 10e9/L    Absolute Lymphocytes 1.2 0.8 - 5.3 10e9/L    Absolute Monocytes 0.6 0.0 - 1.3 10e9/L    Absolute Eosinophils 0.3 0.0 - 0.7 10e9/L    Absolute Basophils 0.1 0.0 - 0.2 10e9/L    Abs Immature Granulocytes 0.0 0 - 0.4 10e9/L    Absolute Nucleated RBC 0.0          REVIEW OF SYSTEMS  A 12-point ROS negative except as in HPI    Current Outpatient Medications   Medication Sig Dispense Refill     amLODIPine (NORVASC) 5 MG tablet TAKE 1 TABLET EVERY DAY 90 tablet 3     ASPIRIN 81 MG OR TABS 1 TABLET DAILY PM       calcium carb 1250 mg, 500 mg [place],/vitamin D 200 units (OSCAL WITH D) 500-200 MG-UNIT per tablet Take 1 tablet by mouth 2 times daily (with meals)       FIBER PO Take 2 capsules by mouth At Bedtime        Flaxseed, Linseed, (FLAX PO)        GLUCOSAMINE CHONDROITIN OR TABS 1 twice daily       losartan (COZAAR) 50 MG tablet Take 1 tablet (50 mg) by mouth daily 90 tablet 1     meloxicam (MOBIC) 15 MG tablet TAKE 1 TABLET EVERY DAY 90 tablet 1     Multiple Vitamins-Minerals (PRESERVISION AREDS 2) CAPS One in AM and one in PM         Allergies   Allergen  Reactions     Ceftriaxone Swelling     Sulfa Drugs Rash     Lisinopril Fatigue     Immunization History   Administered Date(s) Administered     HEPA 06/29/2009, 12/30/2009     HepB 06/29/2009, 08/10/2009, 12/30/2009     Influenza (High Dose) 3 valent vaccine 09/28/2012, 10/22/2015, 10/07/2016     Influenza (IIV3) PF 10/15/2013, 10/29/2014     Pneumo Conj 13-V (2010&after) 01/05/2015     Pneumococcal 23 valent 01/01/2005     TD (ADULT, 7+) 03/03/1993, 06/07/2004, 07/21/2014     Zoster vaccine, live 06/13/2007       Past Medical History:   Diagnosis Date     Arthritis 02/01/2014    Per pt had it since FEB     Basal cell cancer     sees dermatologist     Breast cancer (H) 1996    invasive ductal     HTN      Osteopenia      Recurrent UTI        Past Surgical History:   Procedure Laterality Date     BIOPSY  1996 2004,2010    basal cell cancers     BUNIONECTOMY RT/LT       [facility] AFF SURGICAL PATHOLOGY      ganglion cyst removal     COLONOSCOPY       GENITOURINARY SURGERY       HC EXCISION BREAST LESION, OPEN >=1  1996    right breast     SLING TRANSVAGINAL  12/9/2013    Procedure: SLING TRANSVAGINAL;  Cysto with TVT;  Surgeon: Denia Shultz MD;  Location: [facility] OR     SURGICAL HISTORY OF -   1959    right thigh tumor removal/benign       SOCIAL HISTORY  History   Smoking Status     Former Smoker     Packs/day: 1.00     Years: 10.00     Types: Cigarettes     Start date: 1/1/1956     Quit date: 1/1/1985   Smokeless Tobacco     Never Used     Comment: I stopped smoking several times from 1959 to 1985    Social History    Substance and Sexual Activity      Alcohol use: Yes        Alcohol/week: 1.5 - 2.0 oz        Comment: Red wine     History   Drug Use No       FAMILY HISTORY  Family History   Problem Relation Age of Onset     Heart Disease Mother         cad at age 70s     Cancer Mother         KIDNEY     Diabetes Mother      Coronary Artery Disease Mother      Hypertension Mother      Heart Disease Father         chf      "Neurologic Disorder Father         PARKINSONS     Alzheimer Disease Father      Heart Disease Maternal Grandmother         chf     Coronary Artery Disease Maternal Grandmother      Cancer Maternal Grandfather         ?     Heart Disease Paternal Grandmother      Coronary Artery Disease Paternal Grandmother      Heart Disease Paternal Grandfather      Neurologic Disorder Paternal Grandfather         PARKINSONS     Blood Disease Brother         LEUKEMIA     Breast Cancer Other      Diabetes Other         Mother's sister's daughter     Cancer Daughter         CERVICAL     Cancer Other         THYROID     Cerebrovascular Disease Other      Breast Cancer Daughter        PHYSICAL EXAMINATION  /88   Pulse 82   Temp 98.1  F (36.7  C) (Tympanic)   Ht 1.613 m (5' 3.5\")   Wt 62.1 kg (136 lb 12.8 oz)   BMI 23.85 kg/m    Wt Readings from Last 2 Encounters:   02/22/19 62.1 kg (136 lb 12.8 oz)   02/04/19 62.1 kg (137 lb)     Physical Exam   Constitutional: She is oriented to person, place, and time. She appears well-developed and well-nourished.   HENT:   Mouth/Throat: Oropharynx is clear and moist.   Eyes: Conjunctivae and EOM are normal. Pupils are equal, round, and reactive to light.   Neck: Normal range of motion.   Cardiovascular: Regular rhythm.   No murmur heard.  Pulmonary/Chest: Effort normal and breath sounds normal. No respiratory distress. She has no wheezes.   Abdominal: Soft. Bowel sounds are normal. She exhibits no distension.   Musculoskeletal: Normal range of motion. She exhibits no edema or tenderness.   Lymphadenopathy:     She has no cervical adenopathy.   Neurological: She is alert and oriented to person, place, and time. No cranial nerve deficit.   Skin: Skin is warm and dry. No pallor.   Right lower leg with healing incision - clean, dry, and intact with small area of dehiscence at inferior aspect of incision. No evidence of nodular recurrence.   Psychiatric: She has a normal mood and affect. Her " behavior is normal.   Nursing note and vitals reviewed.      ASSESSMENT AND PLAN  #1 Dermal leiomyosarcoma, right lower leg  #2 History of basal cell carcinomas  At this time, we are still waiting internal pathology review of her recent Mohs procedure. CT chest/abdomen/pelvis formal read from today still pending but upon our review, there is possibly a cystic pancreatic lesion that may need future surveillance imaging but no obvious metastatic lesions. 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  We offered reassurance today that this area will be unlikely to cause problems. If recurrence occurs, it is usually local and so we advised that she continue regular skin checks.    Return to clinic as needed, we will call when we have pathology results and if any updates with CT imaging from today.    Patient discussed and evaluated with Dr. Townsend.    Betty Chadwick MD  Medicine-Dermatology PGY-5  [phone]    ---  I evaluated the patient and reviewed the plan of care with the patient and the Resident. I agree with the assessment and plan documented in the clinical note.    The pathology was independently reviewed and Dr. Abel agreed with the diagnosis. We reviewed that given the small size of her tumor we would not recommend adjuvant therapy, and would recommend she continue to follow with her dermatologist. No need to continue with surveillance in oncology. She was very appreciative of the follow-up phone call.    Angelica Townsend M.D.   of Medicine  Hematology, Oncology and Transplantation  [facility]

## 2019-02-22 NOTE — NURSING NOTE
"Oncology Rooming Note    February 22, 2019 1:21 PM   Kate Isaac is a 83 year old female who presents for:    Chief Complaint   Patient presents with     Blood Draw     Pt is here for labs and vitals      Oncology Clinic Visit     [facility] RETURN- LEIOMYOSARCOMA     Initial Vitals: /88   Pulse 82   Temp 98.1  F (36.7  C) (Tympanic)   Resp 14   Ht 1.613 m (5' 3.5\")   Wt 62.1 kg (136 lb 12.8 oz)   SpO2 98%   BMI 23.85 kg/m   Estimated body mass index is 23.85 kg/m  as calculated from the following:    Height as of this encounter: 1.613 m (5' 3.5\").    Weight as of this encounter: 62.1 kg (136 lb 12.8 oz). Body surface area is 1.67 meters squared.  No Pain (0) Comment: Data Unavailable   No LMP recorded. Patient is postmenopausal.  Allergies reviewed: Yes  Medications reviewed: Yes    Medications: Medication refills not needed today.  Pharmacy name entered into [facility]:    [id number] - [place], [place] - [address] AT [facility] [address] [place] [facility], [place] - [address] [place] [id number] [place] [facility] [place], [place] - [address] [id number] - [place] [address] [place] [address] PHARMACY MAIL DELIV[facility] - [facility] [address] [address]  [facility] - [place], [place] - [address]    Clinical concerns: No new concerns. [place] was notified.      Abisai Todd LPN            "

## 2019-02-22 NOTE — DISCHARGE INSTRUCTIONS

## 2019-03-20 ENCOUNTER — TELEPHONE (OUTPATIENT)
Dept: FAMILY MEDICINE | Facility: CLINIC | Age: 84
End: 2019-03-20

## 2019-03-20 DIAGNOSIS — I10 HYPERTENSION GOAL BP (BLOOD PRESSURE) < 140/90: ICD-10-CM

## 2019-03-20 DIAGNOSIS — M17.12 PRIMARY OSTEOARTHRITIS OF LEFT KNEE: ICD-10-CM

## 2019-03-21 RX ORDER — MELOXICAM 15 MG/1
TABLET ORAL
Qty: 90 TABLET | Refills: 1 | OUTPATIENT
Start: 2019-03-21

## 2019-03-21 RX ORDER — LOSARTAN POTASSIUM 50 MG/1
TABLET ORAL
Qty: 90 TABLET | Refills: 1 | OUTPATIENT
Start: 2019-03-21

## 2019-03-21 RX ORDER — AMLODIPINE BESYLATE 5 MG/1
TABLET ORAL
Qty: 90 TABLET | Refills: 3 | OUTPATIENT
Start: 2019-03-21

## 2019-03-21 NOTE — TELEPHONE ENCOUNTER
I spoke to the patient and I scheduled an appointment for her on 5/15/19.  She states that she has enough meds to last until her appointment.  No refill needed at this time.  Sonia Stephens,

## 2019-03-21 NOTE — TELEPHONE ENCOUNTER
Failed protocol and it looks like she is due to be seen in May.  Pharmacy is reaching out proactively for refills. According to chart she should have enough for now.    TC - please call and assist this patient in making an appointment to see Dr. Licea for refills as she is due in early May.  Please as patient if she has enough to get her to the appointment.  If not please route to provider for approval.  Thank you. Astrid Srinivasan R.N.

## 2019-05-14 NOTE — PROGRESS NOTES
"  SUBJECTIVE:   Kate Isaac is a 83 year old female who presents to clinic today for the following   health issues:        Hypertension Follow-up      Outpatient blood pressures are being checked at home.  Results are 120/mid 70's.    Low Salt Diet: no added salt      Amount of exercise or physical activity: 5-6 days a week     Problems taking medications regularly: No    Medication side effects: none    Diet: regular (no restrictions)    Pt with htn well controlled on meds. Needs refills  Pt with arthritis of knee. Needs refill of mobic  labwork completed 2 months ago so no need at this time    Pt with recent diagnosis of leiomyosarcoma on the right leg  Had thorough evaluation per dermatologist.  Had CT of chest/abdomen/pelvis.  Pancreatic cyst noted. She is questioning follow-up for this.   It is not listed in report      Additional history: as documented    Reviewed  and updated as needed this visit by clinical staff  Tobacco  Allergies  Meds  Med Hx  Surg Hx  Fam Hx  Soc Hx        Reviewed and updated as needed this visit by Provider         Labs reviewed in EPIC    ROS:  Constitutional, HEENT, cardiovascular, pulmonary, gi and gu systems are negative, except as otherwise noted.    OBJECTIVE:     /80   Pulse 88   Temp 98.1  F (36.7  C) (Oral)   Resp 17   Ht 1.6 m (5' 3\")   Wt 60.8 kg (134 lb)   SpO2 99%   BMI 23.74 kg/m    Body mass index is 23.74 kg/m .  GENERAL: healthy, alert and no distress  NECK: no adenopathy, no asymmetry, masses, or scars and thyroid normal to palpation  RESP: lungs clear to auscultation - no rales, rhonchi or wheezes  CV: regular rate and rhythm, normal S1 S2, no S3 or S4, no murmur, click or rub, no peripheral edema and peripheral pulses strong  ABDOMEN: soft, nontender, no hepatosplenomegaly, no masses and bowel sounds normal  MS: no gross musculoskeletal defects noted, no edema    Diagnostic Test Results:  none     ASSESSMENT/PLAN:     1. Hypertension goal BP " (blood pressure) < 140/90  At goal on meds  - amLODIPine (NORVASC) 5 MG tablet; TAKE 1 TABLET EVERY DAY  Dispense: 90 tablet; Refill: 3  - losartan (COZAAR) 50 MG tablet; Take 1 tablet (50 mg) by mouth daily  Dispense: 90 tablet; Refill: 1    2. Primary osteoarthritis of left knee  refill  - meloxicam (MOBIC) 15 MG tablet; TAKE 1 TABLET EVERY DAY  Dispense: 90 tablet; Refill: 1    3. Pancreatic cyst  Will try to contact rads to see if follow-up is needed.         Lani Licea MD  [facility]

## 2019-05-15 ENCOUNTER — OFFICE VISIT (OUTPATIENT)
Dept: FAMILY MEDICINE | Facility: CLINIC | Age: 84
End: 2019-05-15
Payer: COMMERCIAL

## 2019-05-15 VITALS
DIASTOLIC BLOOD PRESSURE: 80 MMHG | HEART RATE: 88 BPM | SYSTOLIC BLOOD PRESSURE: 130 MMHG | HEIGHT: 63 IN | BODY MASS INDEX: 23.74 KG/M2 | TEMPERATURE: 98.1 F | RESPIRATION RATE: 17 BRPM | WEIGHT: 134 LBS | OXYGEN SATURATION: 99 %

## 2019-05-15 DIAGNOSIS — I10 HYPERTENSION GOAL BP (BLOOD PRESSURE) < 140/90: Primary | ICD-10-CM

## 2019-05-15 DIAGNOSIS — M17.12 PRIMARY OSTEOARTHRITIS OF LEFT KNEE: ICD-10-CM

## 2019-05-15 DIAGNOSIS — K86.2 PANCREATIC CYST: ICD-10-CM

## 2019-05-15 PROCEDURE — 99214 OFFICE O/P EST MOD 30 MIN: CPT | Performed by: FAMILY MEDICINE

## 2019-05-15 RX ORDER — MELOXICAM 15 MG/1
TABLET ORAL
Qty: 90 TABLET | Refills: 1 | Status: SHIPPED | OUTPATIENT
Start: 2019-05-15 | End: 2019-09-30

## 2019-05-15 RX ORDER — LOSARTAN POTASSIUM 50 MG/1
50 TABLET ORAL DAILY
Qty: 90 TABLET | Refills: 1 | Status: SHIPPED | OUTPATIENT
Start: 2019-05-15 | End: 2019-09-30

## 2019-05-15 RX ORDER — AMLODIPINE BESYLATE 5 MG/1
TABLET ORAL
Qty: 90 TABLET | Refills: 3 | Status: SHIPPED | OUTPATIENT
Start: 2019-05-15 | End: 2020-02-19

## 2019-05-15 ASSESSMENT — MIFFLIN-ST. JEOR: SCORE: 1041.95

## 2019-05-17 ENCOUNTER — TELEPHONE (OUTPATIENT)
Dept: FAMILY MEDICINE | Facility: CLINIC | Age: 84
End: 2019-05-17

## 2019-05-20 ENCOUNTER — TRANSFERRED RECORDS (OUTPATIENT)
Dept: HEALTH INFORMATION MANAGEMENT | Facility: CLINIC | Age: 84
End: 2019-05-20

## 2019-07-03 ENCOUNTER — TRANSFERRED RECORDS (OUTPATIENT)
Dept: HEALTH INFORMATION MANAGEMENT | Facility: CLINIC | Age: 84
End: 2019-07-03

## 2019-07-29 ENCOUNTER — TRANSFERRED RECORDS (OUTPATIENT)
Dept: HEALTH INFORMATION MANAGEMENT | Facility: CLINIC | Age: 84
End: 2019-07-29

## 2019-08-12 ENCOUNTER — TELEPHONE (OUTPATIENT)
Dept: FAMILY MEDICINE | Facility: CLINIC | Age: 84
End: 2019-08-12

## 2019-08-12 NOTE — TELEPHONE ENCOUNTER
Please abstract the following data from this visit with this patient into the appropriate field in Epic:    Mammogram done on this date: 07/29/2019 (approximately), by this group: [facility], results were negative.

## 2019-09-30 DIAGNOSIS — M17.12 PRIMARY OSTEOARTHRITIS OF LEFT KNEE: ICD-10-CM

## 2019-09-30 DIAGNOSIS — I10 HYPERTENSION GOAL BP (BLOOD PRESSURE) < 140/90: ICD-10-CM

## 2019-10-01 RX ORDER — MELOXICAM 15 MG/1
TABLET ORAL
Qty: 90 TABLET | Refills: 1 | Status: SHIPPED | OUTPATIENT
Start: 2019-10-01 | End: 2020-02-19

## 2019-10-01 RX ORDER — LOSARTAN POTASSIUM 50 MG/1
TABLET ORAL
Qty: 90 TABLET | Refills: 0 | Status: SHIPPED | OUTPATIENT
Start: 2019-10-01 | End: 2019-12-08

## 2019-10-01 NOTE — TELEPHONE ENCOUNTER
"Routing refill request to provider for review/approval because: Meloxicam  Drug not on the [facility] refill protocol due to age.    Routing refill request to provider for review/approval because: Losartan  Labs not current:  Creatinine    Creatinine   Date Value Ref Range Status   02/22/2019 0.49 (L) 0.52 - 1.04 mg/dL Final     Please advise on refills.     Requested Prescriptions   Pending Prescriptions Disp Refills     meloxicam (MOBIC) 15 MG tablet [Pharmacy Med Name: MELOXICAM 15 MG Tablet] 90 tablet 1     Sig: TAKE 1 TABLET EVERY DAY       NSAID Medications Failed - 9/30/2019  5:40 PM        Failed - Patient is age 6-64 years        Failed - Normal serum creatinine on file in past 12 months     Recent Labs   Lab Test 02/22/19  1259 02/22/19  1216   CR 0.49*  --    CREAT  --  0.6             Passed - Blood pressure under 140/90 in past 12 months     BP Readings from Last 3 Encounters:   05/15/19 130/80   02/22/19 159/88   02/04/19 159/85                 Passed - Normal ALT on file in past 12 months     Recent Labs   Lab Test 02/22/19  1259   ALT 17             Passed - Normal AST on file in past 12 months     Recent Labs   Lab Test 02/22/19  1259   AST 17             Passed - Recent (12 mo) or future (30 days) visit within the authorizing provider's specialty     Patient has had an office visit with the authorizing provider or a provider within the authorizing providers department within the previous 12 mos or has a future within next 30 days. See \"Patient Info\" tab in inbasket, or \"Choose Columns\" in Meds & Orders section of the refill encounter.              Passed - Normal CBC on file in past 12 months     Recent Labs   Lab Test 02/22/19  1259   WBC 6.7   RBC 4.42   HGB 12.6   HCT 39.3                    Passed - Medication is active on med list        Passed - No active pregnancy on record        Passed - No positive pregnancy test in past 12 months        losartan (COZAAR) 50 MG tablet [Pharmacy Med Name: " "LOSARTAN POTASSIUM 50 MG Tablet] 90 tablet 1     Sig: TAKE 1 TABLET EVERY DAY       Angiotensin-II Receptors Failed - 9/30/2019  5:40 PM        Failed - Normal serum creatinine on file in past 12 months     Recent Labs   Lab Test 02/22/19  1259 02/22/19  1216   CR 0.49*  --    CREAT  --  0.6             Passed - Last blood pressure under 140/90 in past 12 months     BP Readings from Last 3 Encounters:   05/15/19 130/80   02/22/19 159/88   02/04/19 159/85                 Passed - Recent (12 mo) or future (30 days) visit within the authorizing provider's specialty     Patient has had an office visit with the authorizing provider or a provider within the authorizing providers department within the previous 12 mos or has a future within next 30 days. See \"Patient Info\" tab in inbasket, or \"Choose Columns\" in Meds & Orders section of the refill encounter.              Passed - Medication is active on med list        Passed - Patient is age 18 or older        Passed - No active pregnancy on record        Passed - Normal serum potassium on file in past 12 months     Recent Labs   Lab Test 02/22/19  1259   POTASSIUM 4.0                    Passed - No positive pregnancy test in past 12 months        Ramona Villarreal RN                 "

## 2019-10-02 ENCOUNTER — HEALTH MAINTENANCE LETTER (OUTPATIENT)
Age: 84
End: 2019-10-02

## 2019-10-02 ENCOUNTER — MYC MEDICAL ADVICE (OUTPATIENT)
Dept: FAMILY MEDICINE | Facility: CLINIC | Age: 84
End: 2019-10-02

## 2019-10-04 ENCOUNTER — MYC MEDICAL ADVICE (OUTPATIENT)
Dept: FAMILY MEDICINE | Facility: CLINIC | Age: 84
End: 2019-10-04

## 2019-10-30 ENCOUNTER — OFFICE VISIT (OUTPATIENT)
Dept: ORTHOPEDICS | Facility: CLINIC | Age: 84
End: 2019-10-30
Payer: COMMERCIAL

## 2019-10-30 ENCOUNTER — ANCILLARY PROCEDURE (OUTPATIENT)
Dept: GENERAL RADIOLOGY | Facility: CLINIC | Age: 84
End: 2019-10-30
Attending: PEDIATRICS
Payer: COMMERCIAL

## 2019-10-30 VITALS
DIASTOLIC BLOOD PRESSURE: 84 MMHG | SYSTOLIC BLOOD PRESSURE: 132 MMHG | BODY MASS INDEX: 23.39 KG/M2 | HEIGHT: 63 IN | WEIGHT: 132 LBS

## 2019-10-30 VITALS — BODY MASS INDEX: 23.39 KG/M2 | HEIGHT: 63 IN | WEIGHT: 132 LBS

## 2019-10-30 DIAGNOSIS — M17.11 PRIMARY OSTEOARTHRITIS OF RIGHT KNEE: ICD-10-CM

## 2019-10-30 DIAGNOSIS — M25.561 ACUTE PAIN OF RIGHT KNEE: Primary | ICD-10-CM

## 2019-10-30 DIAGNOSIS — M25.561 ACUTE PAIN OF RIGHT KNEE: ICD-10-CM

## 2019-10-30 PROCEDURE — 20611 DRAIN/INJ JOINT/BURSA W/US: CPT | Mod: RT | Performed by: FAMILY MEDICINE

## 2019-10-30 PROCEDURE — 73562 X-RAY EXAM OF KNEE 3: CPT

## 2019-10-30 PROCEDURE — 99214 OFFICE O/P EST MOD 30 MIN: CPT | Performed by: PEDIATRICS

## 2019-10-30 RX ORDER — ROPIVACAINE HYDROCHLORIDE 5 MG/ML
3 INJECTION, SOLUTION EPIDURAL; INFILTRATION; PERINEURAL
Status: SHIPPED | OUTPATIENT
Start: 2019-10-30

## 2019-10-30 RX ORDER — TRIAMCINOLONE ACETONIDE 40 MG/ML
40 INJECTION, SUSPENSION INTRA-ARTICULAR; INTRAMUSCULAR
Status: SHIPPED | OUTPATIENT
Start: 2019-10-30

## 2019-10-30 RX ADMIN — ROPIVACAINE HYDROCHLORIDE 3 ML: 5 INJECTION, SOLUTION EPIDURAL; INFILTRATION; PERINEURAL at 12:10

## 2019-10-30 RX ADMIN — TRIAMCINOLONE ACETONIDE 40 MG: 40 INJECTION, SUSPENSION INTRA-ARTICULAR; INTRAMUSCULAR at 12:10

## 2019-10-30 ASSESSMENT — MIFFLIN-ST. JEOR
SCORE: 1032.88
SCORE: 1032.88

## 2019-10-30 NOTE — LETTER
10/30/2019         RE: Kate Isaac  [address] [address]        Dear Colleague,    Thank you for referring your patient, Kate Isaac, to the [facility]. Please see a copy of my visit note below.    Large Joint Injection/Arthocentesis: R knee joint  Date/Time: 10/30/2019 12:10 PM  Performed by: Casey Lee DO  Authorized by: Casey Lee DO     Indications:  Pain  Needle Size:  21 G  Guidance: ultrasound    Approach:  Superolateral  Location:  Knee      Medications:  40 mg triamcinolone 40 MG/ML; 3 mL ropivacaine 5 MG/ML  Aspirate amount (mL):  7  Aspirate:  Serous, yellow and blood-tinged  Outcome:  Tolerated well, no immediate complications  Procedure discussed: discussed risks, benefits, and alternatives    Consent Given by:  Patient  Prep: patient was prepped and draped in usual sterile fashion        Kate Isaac  :  1935  DOS: 2019  MRN: [id number]      HPI:  Patient here at the request of Dr Lucia for possible Baker's cyst aspiration, and CSI +/- aspiration of the right knee joint.    Injection as above    Assessment  (M25.561) Acute pain of right knee  (primary encounter diagnosis)  (M17.11) Primary osteoarthritis of right knee    Plan:  F/u as directed with Dr Lucia  US guided aspiration and knee injection today  No Franco's cyst present  Expectations and goals of CSI reviewed  Often 2-3 days for steroid effect, and can take up to two weeks for maximum effect  We discussed modified progressive pain-free activity as tolerated  Do not overuse in first two weeks if feeling better due to concern for vulnerability while steroid is working  Supportive care reviewed  All questions were answered today  Contact us with additional questions or concerns  Signs and sx of concern reviewed      Casey Lee DO, CAQ  Primary Care Sports Medicine  [facility]             Again, thank  you for allowing me to participate in the care of your patient.        Sincerely,        Casey Lee, DO

## 2019-10-30 NOTE — LETTER
"    10/30/2019         RE: Kate Isaac  [address] [facility] [id number]        Dear Colleague,    Thank you for referring your patient, Kate Isaac, to the [facility] [place]. Please see a copy of my visit note below.    Sports Medicine Clinic Visit    PCP: Lani Waters    Kate Isaac is a 83 year old female who is seen  as a self referral presenting with right knee pain.  Pain in the posterior aspect of her knee.  Pain has been present for 8 days.  Pain began after getting out of her car.  Pain increases with walking.  Pain with full extension and ascending stairs.    Has been wearing knee sleeves, unsure if they are helpful     **  Currently has pain at rest along posterior right knee, \"along the tendons\".   Positive for noticeable swelling; unsure if present in the back of the knee, and crackling sounds. Movement with pain that causes crackling. Patient will be traveling to [place] this upcoming weekend, departing for the majority of the winter.    PMHx LEFT: Cortisone injection didn't help prior left knee issues. Sereis of 3 injections have been helpful, last one was in May.            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      **  She was previously advised by oncology that the right anterior lower leg tumor that was removed surgically was benign.  However, she has some concerns that this may be has now migrated to the knee and is related to her current symptoms.    Injury: gradual onset     Location of Pain: right posterior knee   Duration of Pain: 8 day(s)  Rating of Pain at worst: 8/10  Rating of Pain Currently: 3/10  Symptoms are better with: Heat, acetaminophen   Symptoms are worse with: stairs, extension, walking, prolonged sitting  Additional Features:   Positive: swelling, popping and catching   Negative: bruising, locking, instability, paresthesias, numbness, weakness, pain in other joints and systemic symptoms  Other evaluation and/or treatments " so far consists of: CBD oil, peppermint oil, Sikhism oil, heating pad  Prior History of related problems: denies     Social History: retired     Review of Systems  Musculoskeletal: as above  Remainder of review of systems is negative including constitutional, CV, pulmonary, GI, Skin and Neurologic except as noted in HPI or medical history.    Past Medical History:   Diagnosis Date     Arthritis 02/01/2014    Per pt had it since FEB     Basal cell cancer     sees dermatologist     Breast cancer (H) 1996    invasive ductal     HTN      Osteopenia      Recurrent UTI      Past Surgical History:   Procedure Laterality Date     BIOPSY  1996 2004,2010    basal cell cancers     BUNIONECTOMY RT/LT       CL AFF SURGICAL PATHOLOGY      ganglion cyst removal     COLONOSCOPY       GENITOURINARY SURGERY       HC EXCISION BREAST LESION, OPEN >=1  1996    right breast     SLING TRANSVAGINAL  12/9/2013    Procedure: SLING TRANSVAGINAL;  Cysto with TVT;  Surgeon: Denia Shultz MD;  Location: [facility] OR     SURGICAL HISTORY OF -   1959    right thigh tumor removal/benign     Family History   Problem Relation Age of Onset     Heart Disease Mother         cad at age 70s     Cancer Mother         KIDNEY     Diabetes Mother      Coronary Artery Disease Mother      Hypertension Mother      Heart Disease Father         chf     Neurologic Disorder Father         PARKINSONS     Alzheimer Disease Father      Heart Disease Maternal Grandmother         chf     Coronary Artery Disease Maternal Grandmother      Cancer Maternal Grandfather         ?     Heart Disease Paternal Grandmother      Coronary Artery Disease Paternal Grandmother      Heart Disease Paternal Grandfather      Neurologic Disorder Paternal Grandfather         PARKINSONS     Blood Disease Brother         LEUKEMIA     Breast Cancer Other      Diabetes Other         Mother's sister's daughter     Cancer Daughter         CERVICAL     Cancer Other         THYROID     Cerebrovascular  Disease Other      Breast Cancer Daughter      Social History     Socioeconomic History     Marital status:      Spouse name: Not on file     Number of children: Not on file     Years of education: Not on file     Highest education level: Not on file   Occupational History     Not on file   Social Needs     Financial resource strain: Not on file     Food insecurity:     Worry: Not on file     Inability: Not on file     Transportation needs:     Medical: Not on file     Non-medical: Not on file   Tobacco Use     Smoking status: Former Smoker     Packs/day: 1.00     Years: 10.00     Pack years: 10.00     Types: Cigarettes     Start date: 1956     Last attempt to quit: 1985     Years since quittin.8     Smokeless tobacco: Never Used     Tobacco comment: I stopped smoking several times from  to    Substance and Sexual Activity     Alcohol use: Yes     Alcohol/week: 2.5 - 3.3 standard drinks     Comment: Red wine     Drug use: No     Sexual activity: Yes     Partners: Male     Birth control/protection: Post-menopausal, Female Surgical     Comment: Tubal in  & now I am post-menopausal   Lifestyle     Physical activity:     Days per week: Not on file     Minutes per session: Not on file     Stress: Not on file   Relationships     Social connections:     Talks on phone: Not on file     Gets together: Not on file     Attends Moravian service: Not on file     Active member of club or organization: Not on file     Attends meetings of clubs or organizations: Not on file     Relationship status: Not on file     Intimate partner violence:     Fear of current or ex partner: Not on file     Emotionally abused: Not on file     Physically abused: Not on file     Forced sexual activity: Not on file   Other Topics Concern      Service No     Blood Transfusions No     Caffeine Concern No     Occupational Exposure No     Hobby Hazards No     Sleep Concern No     Stress Concern No     Weight  "Concern No     Special Diet No     Back Care No     Exercise No     Bike Helmet No     Seat Belt No     Self-Exams No     Parent/sibling w/ CABG, MI or angioplasty before 65F 55M? No   Social History Narrative     Not on file   This document serves as a record of the services and decisions personally performed and made by DO JOHN White. It was created on his behalf by Sami Michael, a trained medical scribe. The creation of this document is based the provider's statements to the medical scribe.    Scribe Sami Michael 10:45 AM 10/30/2019      Objective  /84   Ht 1.6 m (5' 3\")   Wt 59.9 kg (132 lb)   BMI 23.38 kg/m       GENERAL APPEARANCE: healthy, alert and no distress   GAIT: NORMAL  SKIN: no suspicious lesions or rashes  NEURO: Normal strength and tone, mentation intact and speech normal  PSYCH:  mentation appears normal and affect normal/bright  HEENT: no scleral icterus  CV: distal perfusion intact  RESP: nonlabored breathing    Right Knee exam    ROM:        Flexion  degrees; tightness       Extension grossly full    Inspection:       no visible ecchymosis       Joint effusion noted        Fullness noted in the  posterior knee compared to the left    Skin:       no visible deformities       well perfused       capillary refill brisk    Patellar Motion:        Crepitus noted in the patellofemoral joint       Mild pain with patellar translation    Tender:        lateral joint line    Non Tender:        Posterior knee       Medial Joint line       Rest of the knee    Special Tests:        positive (+) Hugo with pain       neg (-) Lachman       neg (-) varus at 0 to 30 degrees        neg (-) valgus at 0 to 30 degrees        Mild pain with forced extension    Radiology  Visualized radiographs of bilateral standing knee obtained today, and reviewed the images with the patient.  Impression: DJD.    XR Knee Standing AP Bilat [place] Bilat Lat Right    Narrative    KNEE STANDING AP BILAT " ALFIE LÓPEZ [facility] RT 10/30/2019 10:24 AM     HISTORY: Acute pain of right knee.       Impression    IMPRESSION:  1. Right knee 3 views: Moderately prominent medial compartment  narrowing. Medial compartment osteophytosis. Joint effusion.  Patellofemoral osteophytosis. I suspect a small loose body  posteriorly. There could also be a subtle loose body in the notch.  Prominent patellofemoral osteoarthritis laterally. Mild lateral  patellar subluxation.  2. Left knee 2 views: Marked lateral compartment narrowing with  obliteration of the joint space. Three-compartment osteophytosis. Mild  medial compartment narrowing.    NICOLE CHAPIN MD         Assessment:  1. Acute pain of right knee    2. Primary osteoarthritis of right knee        Plan:  Discussed the assessment with the patient.    Discussed nature of degenerative arthrosis of the knee. Discussed symptom treatment with over-the-counter medications, ice or heat, topical treatments, and rest if needed. Discussed use of compression or bracing for comfort. Discussed potential benefits of rehabilitation, to maintain or improve function at the knee. Discussed benefits of exercise and weight loss (if applicable) to reduce pressure at the knee. Discussed injection therapy. Also briefly discussed future consideration of referral to orthopedic surgery for further evaluation and discussion of additional treatment options.      Radiologic images reviewed and discussed with patient today    Ice, Over the counter medications as needed   Activity modification discussed; Patient inquired about wanting to be up and active. Advised: Pool>Biking>Elliptical>Walking   Discussed what to alter/avoid.  Okay to remain active as able.  We discussed focusing on lower impact activities.  Rehab: Discussed physical therapy; offered.  Imaging: Discussed obtaining a MRI, given posterior knee pain, though I do not think this is required given underlying degenerative arthrosis; patient proceeded with  US today with Dr. Lee   Discussed her question of prior right lower leg mass.  I think her posterior knee complaints are related to the underlying arthrosis, and she may have a popliteal cyst.  Will have her see 1 of my colleagues next for possible aspiration of this, as well as possible injection.  Alternatively, joint steroid injection is an option as well.    Follow up: Will leave as needed   Future Considerations: MRI for further evaluation of posterior knee pain, if not relieved by procedure with Dr Lee  Questions answered. The patient indicates understanding of these issues and agrees with the plan.    Casey Lucia DO, CAQ        Disclaimer: This note consists of symbols derived from keyboarding, dictation and/or voice recognition software. As a result, there may be errors in the script that have gone undetected. Please consider this when interpreting information found in this chart.    The information in this document, created by a scribe for me, accurately reflects the services I personally performed and the decisions made by me. I have reviewed and approved this document for accuracy.         Again, thank you for allowing me to participate in the care of your patient.        Sincerely,        Casey Lucia DO

## 2019-10-30 NOTE — PATIENT INSTRUCTIONS
Ice, Over the counter medications as needed   Activity modification discussed: Pool> Biking> Elliptical> Walking

## 2019-10-30 NOTE — PROGRESS NOTES
"Sports Medicine Clinic Visit    PCP: Lani Waters Marlin is a 83 year old female who is seen  as a self referral presenting with right knee pain.  Pain in the posterior aspect of her knee.  Pain has been present for 8 days.  Pain began after getting out of her car.  Pain increases with walking.  Pain with full extension and ascending stairs.    Has been wearing knee sleeves, unsure if they are helpful     **  Currently has pain at rest along posterior right knee, \"along the tendons\".   Positive for noticeable swelling; unsure if present in the back of the knee, and crackling sounds. Movement with pain that causes crackling. Patient will be traveling to [place] this upcoming weekend, departing for the majority of the winter.    PMHx LEFT: Cortisone injection didn't help prior left knee issues. Sereis of 3 injections have been helpful, last one was in May.            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      **  She was previously advised by oncology that the right anterior lower leg tumor that was removed surgically was benign.  However, she has some concerns that this may be has now migrated to the knee and is related to her current symptoms.    Injury: gradual onset     Location of Pain: right posterior knee   Duration of Pain: 8 day(s)  Rating of Pain at worst: 8/10  Rating of Pain Currently: 3/10  Symptoms are better with: Heat, acetaminophen   Symptoms are worse with: stairs, extension, walking, prolonged sitting  Additional Features:   Positive: swelling, popping and catching   Negative: bruising, locking, instability, paresthesias, numbness, weakness, pain in other joints and systemic symptoms  Other evaluation and/or treatments so far consists of: CBD oil, peppermint oil, Yazidi oil, heating pad  Prior History of related problems: denies     Social History: retired     Review of Systems  Musculoskeletal: as above  Remainder of review of systems is negative including constitutional, " CV, pulmonary, GI, Skin and Neurologic except as noted in HPI or medical history.    Past Medical History:   Diagnosis Date     Arthritis 02/01/2014    Per pt had it since FEB     Basal cell cancer     sees dermatologist     Breast cancer (H) 1996    invasive ductal     HTN      Osteopenia      Recurrent UTI      Past Surgical History:   Procedure Laterality Date     BIOPSY  1996 2004,2010    basal cell cancers     BUNIONECTOMY RT/LT       CL AFF SURGICAL PATHOLOGY      ganglion cyst removal     COLONOSCOPY       GENITOURINARY SURGERY       HC EXCISION BREAST LESION, OPEN >=1  1996    right breast     SLING TRANSVAGINAL  12/9/2013    Procedure: SLING TRANSVAGINAL;  Cysto with TVT;  Surgeon: Denia Shultz MD;  Location: [facility] OR     SURGICAL HISTORY OF -   1959    right thigh tumor removal/benign     Family History   Problem Relation Age of Onset     Heart Disease Mother         cad at age 70s     Cancer Mother         KIDNEY     Diabetes Mother      Coronary Artery Disease Mother      Hypertension Mother      Heart Disease Father         chf     Neurologic Disorder Father         PARKINSONS     Alzheimer Disease Father      Heart Disease Maternal Grandmother         chf     Coronary Artery Disease Maternal Grandmother      Cancer Maternal Grandfather         ?     Heart Disease Paternal Grandmother      Coronary Artery Disease Paternal Grandmother      Heart Disease Paternal Grandfather      Neurologic Disorder Paternal Grandfather         PARKINSONS     Blood Disease Brother         LEUKEMIA     Breast Cancer Other      Diabetes Other         Mother's sister's daughter     Cancer Daughter         CERVICAL     Cancer Other         THYROID     Cerebrovascular Disease Other      Breast Cancer Daughter      Social History     Socioeconomic History     Marital status:      Spouse name: Not on file     Number of children: Not on file     Years of education: Not on file     Highest education level: Not on file    Occupational History     Not on file   Social Needs     Financial resource strain: Not on file     Food insecurity:     Worry: Not on file     Inability: Not on file     Transportation needs:     Medical: Not on file     Non-medical: Not on file   Tobacco Use     Smoking status: Former Smoker     Packs/day: 1.00     Years: 10.00     Pack years: 10.00     Types: Cigarettes     Start date: 1956     Last attempt to quit: 1985     Years since quittin.8     Smokeless tobacco: Never Used     Tobacco comment: I stopped smoking several times from  to    Substance and Sexual Activity     Alcohol use: Yes     Alcohol/week: 2.5 - 3.3 standard drinks     Comment: Red wine     Drug use: No     Sexual activity: Yes     Partners: Male     Birth control/protection: Post-menopausal, Female Surgical     Comment: Tubal in  & now I am post-menopausal   Lifestyle     Physical activity:     Days per week: Not on file     Minutes per session: Not on file     Stress: Not on file   Relationships     Social connections:     Talks on phone: Not on file     Gets together: Not on file     Attends Zoroastrianism service: Not on file     Active member of club or organization: Not on file     Attends meetings of clubs or organizations: Not on file     Relationship status: Not on file     Intimate partner violence:     Fear of current or ex partner: Not on file     Emotionally abused: Not on file     Physically abused: Not on file     Forced sexual activity: Not on file   Other Topics Concern      Service No     Blood Transfusions No     Caffeine Concern No     Occupational Exposure No     Hobby Hazards No     Sleep Concern No     Stress Concern No     Weight Concern No     Special Diet No     Back Care No     Exercise No     Bike Helmet No     Seat Belt No     Self-Exams No     Parent/sibling w/ CABG, MI or angioplasty before 65F 55M? No   Social History Narrative     Not on file   This document serves as a record of  "the services and decisions personally performed and made by DO JOHN White. It was created on his behalf by Sami Michael, a trained medical scribe. The creation of this document is based the provider's statements to the medical scribe.    Brent Michael 10:45 AM 10/30/2019      Objective  /84   Ht 1.6 m (5' 3\")   Wt 59.9 kg (132 lb)   BMI 23.38 kg/m      GENERAL APPEARANCE: healthy, alert and no distress   GAIT: NORMAL  SKIN: no suspicious lesions or rashes  NEURO: Normal strength and tone, mentation intact and speech normal  PSYCH:  mentation appears normal and affect normal/bright  HEENT: no scleral icterus  CV: distal perfusion intact  RESP: nonlabored breathing    Right Knee exam    ROM:        Flexion  degrees; tightness       Extension grossly full    Inspection:       no visible ecchymosis       Joint effusion noted        Fullness noted in the  posterior knee compared to the left    Skin:       no visible deformities       well perfused       capillary refill brisk    Patellar Motion:        Crepitus noted in the patellofemoral joint       Mild pain with patellar translation    Tender:        lateral joint line    Non Tender:        Posterior knee       Medial Joint line       Rest of the knee    Special Tests:        positive (+) Hugo with pain       neg (-) Lachman       neg (-) varus at 0 to 30 degrees        neg (-) valgus at 0 to 30 degrees        Mild pain with forced extension    Radiology  Visualized radiographs of bilateral standing knee obtained today, and reviewed the images with the patient.  Impression: DJD.    XR Knee Standing AP Bilat [place] Bilat Lat Right    Narrative    KNEE STANDING AP BILAT [place] BILAT LAT RT 10/30/2019 10:24 AM     HISTORY: Acute pain of right knee.       Impression    IMPRESSION:  1. Right knee 3 views: Moderately prominent medial compartment  narrowing. Medial compartment osteophytosis. Joint effusion.  Patellofemoral " osteophytosis. I suspect a small loose body  posteriorly. There could also be a subtle loose body in the notch.  Prominent patellofemoral osteoarthritis laterally. Mild lateral  patellar subluxation.  2. Left knee 2 views: Marked lateral compartment narrowing with  obliteration of the joint space. Three-compartment osteophytosis. Mild  medial compartment narrowing.    NICOLE CHAPIN MD         Assessment:  1. Acute pain of right knee    2. Primary osteoarthritis of right knee        Plan:  Discussed the assessment with the patient.    Discussed nature of degenerative arthrosis of the knee. Discussed symptom treatment with over-the-counter medications, ice or heat, topical treatments, and rest if needed. Discussed use of compression or bracing for comfort. Discussed potential benefits of rehabilitation, to maintain or improve function at the knee. Discussed benefits of exercise and weight loss (if applicable) to reduce pressure at the knee. Discussed injection therapy. Also briefly discussed future consideration of referral to orthopedic surgery for further evaluation and discussion of additional treatment options.      Radiologic images reviewed and discussed with patient today    Ice, Over the counter medications as needed   Activity modification discussed; Patient inquired about wanting to be up and active. Advised: Pool>Biking>Elliptical>Walking   Discussed what to alter/avoid.  Okay to remain active as able.  We discussed focusing on lower impact activities.  Rehab: Discussed physical therapy; offered.  Imaging: Discussed obtaining a MRI, given posterior knee pain, though I do not think this is required given underlying degenerative arthrosis; patient proceeded with US today with Dr. Lee   Discussed her question of prior right lower leg mass.  I think her posterior knee complaints are related to the underlying arthrosis, and she may have a popliteal cyst.  Will have her see 1 of my colleagues next for possible  aspiration of this, as well as possible injection.  Alternatively, joint steroid injection is an option as well.    Follow up: Will leave as needed   Future Considerations: MRI for further evaluation of posterior knee pain, if not relieved by procedure with Dr Lee  Questions answered. The patient indicates understanding of these issues and agrees with the plan.    Casey Lucia DO, CAQ        Disclaimer: This note consists of symbols derived from keyboarding, dictation and/or voice recognition software. As a result, there may be errors in the script that have gone undetected. Please consider this when interpreting information found in this chart.    The information in this document, created by a scribe for me, accurately reflects the services I personally performed and the decisions made by me. I have reviewed and approved this document for accuracy.

## 2019-10-30 NOTE — PROGRESS NOTES
Large Joint Injection/Arthocentesis: R knee joint  Date/Time: 10/30/2019 12:10 PM  Performed by: Casey Lee DO  Authorized by: Casey Lee DO     Indications:  Pain  Needle Size:  21 G  Guidance: ultrasound    Approach:  Superolateral  Location:  Knee      Medications:  40 mg triamcinolone 40 MG/ML; 3 mL ropivacaine 5 MG/ML  Aspirate amount (mL):  7  Aspirate:  Serous, yellow and blood-tinged  Outcome:  Tolerated well, no immediate complications  Procedure discussed: discussed risks, benefits, and alternatives    Consent Given by:  Patient  Prep: patient was prepped and draped in usual sterile fashion        Kate Isaac  :  1935  DOS: 2019  MRN: [id number]      HPI:  Patient here at the request of Dr Lucia for possible Baker's cyst aspiration, and CSI +/- aspiration of the right knee joint.    Injection as above    Assessment  (M25.561) Acute pain of right knee  (primary encounter diagnosis)  (M17.11) Primary osteoarthritis of right knee    Plan:  F/u as directed with Dr Lucia  US guided aspiration and knee injection today  No Franco's cyst present  Expectations and goals of CSI reviewed  Often 2-3 days for steroid effect, and can take up to two weeks for maximum effect  We discussed modified progressive pain-free activity as tolerated  Do not overuse in first two weeks if feeling better due to concern for vulnerability while steroid is working  Supportive care reviewed  All questions were answered today  Contact us with additional questions or concerns  Signs and sx of concern reviewed      Casey Lee DO, CAQ  Primary Care Sports Medicine  [facility]

## 2019-11-01 ENCOUNTER — TELEPHONE (OUTPATIENT)
Dept: ORTHOPEDICS | Facility: CLINIC | Age: 84
End: 2019-11-01

## 2019-11-01 NOTE — PATIENT INSTRUCTIONS
[facility] Injection Discharge Instructions      You may shower, however avoid swimming, tub baths or hot tubs for 24 hours following your procedure    You may have a mild to moderate increase in pain for several days following the injection.    It may take up to 14 days for the steroid medication to start working although you may feel the effect as early as a few days after the procedure.    You may use ice packs for 10-15 minutes, 3 to 4 times a day at the injection site for comfort    You may use anti-inflammatory medications (such as Ibuprofen or Aleve or Advil) or Tylenol for pain control if necessary    If you were fasting, you may resume your normal diet and medications after the procedure    If you have diabetes, check your blood sugar more frequently than usual as your blood sugar may be higher than normal for 10-14 days following a steroid injection. Contact your doctor who manages your diabetes if your blood sugar is higher than usual      If you experience any of the following, call [facility] @ [phone] or [phone]  -Fever over 100 degree F  -Swelling, bleeding, redness, drainage, warmth at the injection site  - New or worsening pain

## 2019-11-01 NOTE — TELEPHONE ENCOUNTER
Patient notified via [employer] of next steps and that she has access to her AVS.    Betty Herrera ATC

## 2019-11-01 NOTE — TELEPHONE ENCOUNTER
Patient LVM requesting her office visit notes/discharge information be posted to [facility].     States she seen Dr. Lucia and Dr. Lee, believes she remembers all the instructions but would like to clarify the instructions.

## 2019-12-08 ENCOUNTER — MYC MEDICAL ADVICE (OUTPATIENT)
Dept: FAMILY MEDICINE | Facility: CLINIC | Age: 84
End: 2019-12-08

## 2019-12-09 NOTE — TELEPHONE ENCOUNTER
Patient has appointment for blood pressure check    To provider to advise if patient needing fasting labs and do you want patient to continue taking low dose aspirin?    Rubina JUAREZN, RN, CPN

## 2019-12-10 NOTE — PROGRESS NOTES
Subjective     Kate Isaac is a 84 year old female who presents to clinic today for the following health issues:    HPI   Hypertension Follow-up      Do you check your blood pressure regularly outside of the clinic? Yes     Are you following a low salt diet? Yes    Are your blood pressures ever more than 140 on the top number (systolic) OR more   than 90 on the bottom number (diastolic), for example 140/90? No    Question: Should she continue baby aspirin or the low dose?    BP at goal on meds, needs refill and labwork    Had life screening done. They noted she had osteoporosis  Last one thru here was normal  Will repeat.         Reviewed and updated as needed this visit by Provider  Problems         Review of Systems   ROS COMP: Constitutional, HEENT, cardiovascular, pulmonary, gi and gu systems are negative, except as otherwise noted.      Objective    /66   Pulse 99   Temp 97.6  F (36.4  C) (Oral)   Wt 60.7 kg (133 lb 12.8 oz)   SpO2 97%   BMI 23.70 kg/m    Body mass index is 23.7 kg/m .  Physical Exam   GENERAL: healthy, alert and no distress  EYES: Eyes grossly normal to inspection, PERRL and conjunctivae and sclerae normal  HENT: ear canals and TM's normal, nose and mouth without ulcers or lesions  NECK: no adenopathy, no asymmetry, masses, or scars and thyroid normal to palpation  RESP: lungs clear to auscultation - no rales, rhonchi or wheezes  CV: regular rate and rhythm, normal S1 S2, no S3 or S4, no murmur, click or rub, no peripheral edema and peripheral pulses strong  ABDOMEN: soft, nontender, no hepatosplenomegaly, no masses and bowel sounds normal  MS: no gross musculoskeletal defects noted, no edema  PSYCH: mentation appears normal, affect normal/bright    Diagnostic Test Results:  Labs reviewed in Epic        Assessment & Plan     1. Encounter for Medicare annual wellness exam  See below    2. Hypertension goal BP (blood pressure) < 140/90  At goal on meds, refill and check labs  -  "losartan (COZAAR) 50 MG tablet; TAKE 1 TABLET EVERY DAY  Dispense: 90 tablet; Refill: 1  - Basic metabolic panel    3. Estrogen deficiency  Recheck due to abnormal one on life screen  - DX Hip/Pelvis/Spine; Future    4. Screening cholesterol level  Recheck  - Lipid panel reflex to direct LDL Fasting           Return in about 53 weeks (around 12/16/2020) for Annual Wellness Visit.    Lani Licea MD  [facility]    SUBJECTIVE:   Kate Isaac is a 84 year old female who presents for Preventive Visit.      Are you in the first 12 months of your Medicare Part B coverage?  No    Physical Health:    In general, how would you rate your overall physical health? excellent    Outside of work, how many days during the week do you exercise? 6-7 days/week    Outside of work, approximately how many minutes a day do you exercise?30-45 minutes    If you drink alcohol do you typically have >3 drinks per day or >7 drinks per week? No    Do you usually eat at least 4 servings of fruit and vegetables a day, include whole grains & fiber and avoid regularly eating high fat or \"junk\" foods? Yes    Do you have any problems taking medications regularly?  No    Do you have any side effects from medications? none    Needs assistance for the following daily activities: no assistance needed    Which of the following safety concerns are present in your home?  none identified     Hearing impairment: No    In the past 6 months, have you been bothered by leaking of urine? no    Mental Health:    In general, how would you rate your overall mental or emotional health? fair  PHQ-2 Score:      Do you feel safe in your environment? Yes    Have you ever done Advance Care Planning? (For example, a Health Directive, POLST, or a discussion with a medical provider or your loved ones about your wishes): Yes, advance care planning is on file.    Additional concerns to address?  No    Fall risk:  Fallen 2 or more times in the past year?: " No  Any fall with injury in the past year?: No    Cognitive Screenin) Repeat 3 items (Leader, Season, Table)      2) Clock draw:   NORMAL  3) 3 item recall:   Recalls 2 objects   Results: NORMAL clock, 1-2 items recalled: COGNITIVE IMPAIRMENT LESS LIKELY    Mini-CogTM Copyright STACEY Jacques. Licensed by the author for use in [facility]; reprinted with permission (dipti@[facility]). All rights reserved.      Do you have sleep apnea, excessive snoring or daytime drowsiness?: no     Will do colonoscopy and dexa when she returns from [place] in spring            Reviewed and updated as needed this visit by clinical staff  Tobacco  Allergies  Meds  Problems  Med Hx  Surg Hx  Fam Hx  Soc Hx          Reviewed and updated as needed this visit by Provider  Problems        Social History     Tobacco Use     Smoking status: Former Smoker     Packs/day: 1.00     Years: 10.00     Pack years: 10.00     Types: Cigarettes     Start date: 1956     Last attempt to quit: 1985     Years since quittin.9     Smokeless tobacco: Never Used     Tobacco comment: I stopped smoking several times from  to    Substance Use Topics     Alcohol use: Yes     Alcohol/week: 2.5 - 3.3 standard drinks     Comment: Red wine                           Current providers sharing in care for this patient include:   Patient Care Team:  Lani Waters MD as PCP - General  Lani Waters MD as Assigned PCP  Meredith Humphries RN as Clinic Care Coordinator (Nurse)    The following health maintenance items are reviewed in Epic and correct as of today:  Health Maintenance   Topic Date Due     DEXA  2018     COLONOSCOPY  2018     MEDICARE ANNUAL WELLNESS VISIT  2019     FALL RISK ASSESSMENT  2019     ADVANCE CARE PLANNING  2021     DTAP/TDAP/TD IMMUNIZATION (4 - Td) 2024     PHQ-2  Completed     INFLUENZA VACCINE  Completed     PNEUMOCOCCAL IMMUNIZATION 65+ HIGH/HIGHEST RISK  Completed      "ZOSTER IMMUNIZATION  Completed     IPV IMMUNIZATION  Aged Out     MENINGITIS IMMUNIZATION  Aged Out     Lab work is in process      ROS:  Constitutional, HEENT, cardiovascular, pulmonary, gi and gu systems are negative, except as otherwise noted.    OBJECTIVE:   /66   Pulse 99   Temp 97.6  F (36.4  C) (Oral)   Wt 60.7 kg (133 lb 12.8 oz)   SpO2 97%   BMI 23.70 kg/m   Estimated body mass index is 23.7 kg/m  as calculated from the following:    Height as of 10/30/19: 1.6 m (5' 3\").    Weight as of this encounter: 60.7 kg (133 lb 12.8 oz).  EXAM:   See exam above    Diagnostic Test Results:  Labs reviewed in Epic    ASSESSMENT / PLAN:   1. Encounter for Medicare annual wellness exam  completed    COUNSELING:  Reviewed preventive health counseling, as reflected in patient instructions       Regular exercise       Healthy diet/nutrition       Vision screening       Dental care       Osteoporosis Prevention/Bone Health       Colon cancer screening    Estimated body mass index is 23.7 kg/m  as calculated from the following:    Height as of 10/30/19: 1.6 m (5' 3\").    Weight as of this encounter: 60.7 kg (133 lb 12.8 oz).         reports that she quit smoking about 34 years ago. Her smoking use included cigarettes. She started smoking about 63 years ago. She has a 10.00 pack-year smoking history. She has never used smokeless tobacco.      Appropriate preventive services were discussed with this patient, including applicable screening as appropriate for cardiovascular disease, diabetes, osteopenia/osteoporosis, and glaucoma.  As appropriate for age/gender, discussed screening for colorectal cancer, prostate cancer, breast cancer, and cervical cancer. Checklist reviewing preventive services available has been given to the patient.    Reviewed patients plan of care and provided an AVS. The Intermediate Care Plan ( asthma action plan, low back pain action plan, and migraine action plan) for Kate meets the Care Plan " requirement. This Care Plan has been established and reviewed with the Patient.    Counseling Resources:  ATP IV Guidelines  Pooled Cohorts Equation Calculator  Breast Cancer Risk Calculator  FRAX Risk Assessment  ICSI Preventive Guidelines  Dietary Guidelines for Americans, 2010  USDA's MyPlate  ASA Prophylaxis  Lung CA Screening    Lani Licea MD  [facility]

## 2019-12-11 ENCOUNTER — OFFICE VISIT (OUTPATIENT)
Dept: FAMILY MEDICINE | Facility: CLINIC | Age: 84
End: 2019-12-11
Payer: COMMERCIAL

## 2019-12-11 VITALS
DIASTOLIC BLOOD PRESSURE: 66 MMHG | WEIGHT: 133.8 LBS | HEART RATE: 99 BPM | BODY MASS INDEX: 23.7 KG/M2 | OXYGEN SATURATION: 97 % | TEMPERATURE: 97.6 F | SYSTOLIC BLOOD PRESSURE: 134 MMHG

## 2019-12-11 DIAGNOSIS — Z13.220 SCREENING CHOLESTEROL LEVEL: ICD-10-CM

## 2019-12-11 DIAGNOSIS — I10 HYPERTENSION GOAL BP (BLOOD PRESSURE) < 140/90: ICD-10-CM

## 2019-12-11 DIAGNOSIS — Z00.00 ENCOUNTER FOR MEDICARE ANNUAL WELLNESS EXAM: Primary | ICD-10-CM

## 2019-12-11 DIAGNOSIS — E28.39 ESTROGEN DEFICIENCY: ICD-10-CM

## 2019-12-11 LAB
ANION GAP SERPL CALCULATED.3IONS-SCNC: 7 MMOL/L (ref 3–14)
BUN SERPL-MCNC: 12 MG/DL (ref 7–30)
CALCIUM SERPL-MCNC: 9.1 MG/DL (ref 8.5–10.1)
CHLORIDE SERPL-SCNC: 106 MMOL/L (ref 94–109)
CHOLEST SERPL-MCNC: 223 MG/DL
CO2 SERPL-SCNC: 27 MMOL/L (ref 20–32)
CREAT SERPL-MCNC: 0.48 MG/DL (ref 0.52–1.04)
GFR SERPL CREATININE-BSD FRML MDRD: >90 ML/MIN/{1.73_M2}
GLUCOSE SERPL-MCNC: 101 MG/DL (ref 70–99)
HDLC SERPL-MCNC: 91 MG/DL
LDLC SERPL CALC-MCNC: 113 MG/DL
NONHDLC SERPL-MCNC: 132 MG/DL
POTASSIUM SERPL-SCNC: 3.6 MMOL/L (ref 3.4–5.3)
SODIUM SERPL-SCNC: 140 MMOL/L (ref 133–144)
TRIGL SERPL-MCNC: 95 MG/DL

## 2019-12-11 PROCEDURE — 80048 BASIC METABOLIC PNL TOTAL CA: CPT | Performed by: FAMILY MEDICINE

## 2019-12-11 PROCEDURE — 99397 PER PM REEVAL EST PAT 65+ YR: CPT | Performed by: FAMILY MEDICINE

## 2019-12-11 PROCEDURE — 99213 OFFICE O/P EST LOW 20 MIN: CPT | Mod: 25 | Performed by: FAMILY MEDICINE

## 2019-12-11 PROCEDURE — 80061 LIPID PANEL: CPT | Performed by: FAMILY MEDICINE

## 2019-12-11 PROCEDURE — 36415 COLL VENOUS BLD VENIPUNCTURE: CPT | Performed by: FAMILY MEDICINE

## 2019-12-11 RX ORDER — LOSARTAN POTASSIUM 50 MG/1
TABLET ORAL
Qty: 90 TABLET | Refills: 1 | Status: SHIPPED | OUTPATIENT
Start: 2019-12-11 | End: 2019-12-12

## 2019-12-11 NOTE — PATIENT INSTRUCTIONS
Patient Education   Personalized Prevention Plan  You are due for the preventive services outlined below.  Your care team is available to assist you in scheduling these services.  If you have already completed any of these items, please share that information with your care team to update in your medical record.  Health Maintenance Due   Topic Date Due     Osteoporosis Screening  08/12/2018     Colonoscopy  09/16/2018     Annual Wellness Visit  05/30/2019     FALL RISK ASSESSMENT  05/30/2019

## 2019-12-12 ENCOUNTER — MYC MEDICAL ADVICE (OUTPATIENT)
Dept: FAMILY MEDICINE | Facility: CLINIC | Age: 84
End: 2019-12-12

## 2019-12-12 ENCOUNTER — TRANSFERRED RECORDS (OUTPATIENT)
Dept: HEALTH INFORMATION MANAGEMENT | Facility: CLINIC | Age: 84
End: 2019-12-12

## 2019-12-12 DIAGNOSIS — I10 HYPERTENSION GOAL BP (BLOOD PRESSURE) < 140/90: ICD-10-CM

## 2019-12-12 RX ORDER — LOSARTAN POTASSIUM 50 MG/1
TABLET ORAL
Qty: 90 TABLET | Refills: 1 | Status: SHIPPED | OUTPATIENT
Start: 2019-12-12 | End: 2020-05-04

## 2019-12-20 ENCOUNTER — ANCILLARY PROCEDURE (OUTPATIENT)
Dept: BONE DENSITY | Facility: CLINIC | Age: 84
End: 2019-12-20
Attending: FAMILY MEDICINE
Payer: COMMERCIAL

## 2019-12-20 DIAGNOSIS — E28.39 ESTROGEN DEFICIENCY: ICD-10-CM

## 2019-12-20 PROCEDURE — 77080 DXA BONE DENSITY AXIAL: CPT | Performed by: INTERNAL MEDICINE

## 2019-12-28 ENCOUNTER — MYC MEDICAL ADVICE (OUTPATIENT)
Dept: FAMILY MEDICINE | Facility: CLINIC | Age: 84
End: 2019-12-28

## 2020-01-31 ENCOUNTER — MYC MEDICAL ADVICE (OUTPATIENT)
Dept: FAMILY MEDICINE | Facility: CLINIC | Age: 85
End: 2020-01-31

## 2020-02-18 DIAGNOSIS — I10 HYPERTENSION GOAL BP (BLOOD PRESSURE) < 140/90: ICD-10-CM

## 2020-02-18 DIAGNOSIS — M17.12 PRIMARY OSTEOARTHRITIS OF LEFT KNEE: ICD-10-CM

## 2020-02-19 RX ORDER — AMLODIPINE BESYLATE 5 MG/1
TABLET ORAL
Qty: 90 TABLET | Refills: 3 | Status: SHIPPED | OUTPATIENT
Start: 2020-02-19 | End: 2020-10-05

## 2020-02-19 RX ORDER — MELOXICAM 15 MG/1
TABLET ORAL
Qty: 90 TABLET | Refills: 1 | Status: SHIPPED | OUTPATIENT
Start: 2020-02-19 | End: 2020-08-06

## 2020-02-19 NOTE — TELEPHONE ENCOUNTER
"No appointment pending at this time.  Routing to provider to advise.    Massiel JUAREZN, RN    Requested Prescriptions   Pending Prescriptions Disp Refills     AMLODIPINE 5 MG PO tablet [Pharmacy Med Name: AMLODIPINE BESYLATE 5 MG Tablet] 90 tablet 3     Sig: TAKE 1 TABLET EVERY DAY       Calcium Channel Blockers Protocol  Failed - 2/18/2020 12:50 PM        Failed - Normal serum creatinine on file in past 12 months     Recent Labs   Lab Test 12/11/19  1018  02/22/19  1216   CR 0.48*   < >  --    CREAT  --   --  0.6    < > = values in this interval not displayed.             Passed - Blood pressure under 140/90 in past 12 months     BP Readings from Last 3 Encounters:   12/11/19 134/66   10/30/19 132/84   05/15/19 130/80             Passed - Recent (12 mo) or future (30 days) visit within the authorizing provider's specialty     Patient has had an office visit with the authorizing provider or a provider within the authorizing providers department within the previous 12 mos or has a future within next 30 days. See \"Patient Info\" tab in inbasket, or \"Choose Columns\" in Meds & Orders section of the refill encounter.              Passed - Medication is active on med list        Passed - Patient is age 18 or older        Passed - No active pregnancy on record        Passed - No positive pregnancy test in past 12 months        MELOXICAM 15 MG PO tablet [Pharmacy Med Name: MELOXICAM 15 MG Tablet] 90 tablet 1     Sig: TAKE 1 TABLET EVERY DAY       NSAID Medications Failed - 2/18/2020 12:50 PM        Failed - Patient is age 6-64 years        Failed - Normal serum creatinine on file in past 12 months     Recent Labs   Lab Test 12/11/19  1018  02/22/19  1216   CR 0.48*   < >  --    CREAT  --   --  0.6    < > = values in this interval not displayed.             Passed - Blood pressure under 140/90 in past 12 months     BP Readings from Last 3 Encounters:   12/11/19 134/66   10/30/19 132/84   05/15/19 130/80             Passed - " "Normal ALT on file in past 12 months     Recent Labs   Lab Test 02/22/19  1259   ALT 17             Passed - Normal AST on file in past 12 months     Recent Labs   Lab Test 02/22/19  1259   AST 17             Passed - Recent (12 mo) or future (30 days) visit within the authorizing provider's specialty     Patient has had an office visit with the authorizing provider or a provider within the authorizing providers department within the previous 12 mos or has a future within next 30 days. See \"Patient Info\" tab in inbasket, or \"Choose Columns\" in Meds & Orders section of the refill encounter.            Passed - Normal CBC on file in past 12 months     Recent Labs   Lab Test 02/22/19  1259   WBC 6.7   RBC 4.42   HGB 12.6   HCT 39.3                Passed - Medication is active on med list        Passed - No active pregnancy on record        Passed - No positive pregnancy test in past 12 months          "

## 2020-05-01 ENCOUNTER — TRANSFERRED RECORDS (OUTPATIENT)
Dept: HEALTH INFORMATION MANAGEMENT | Facility: CLINIC | Age: 85
End: 2020-05-01

## 2020-05-03 DIAGNOSIS — I10 HYPERTENSION GOAL BP (BLOOD PRESSURE) < 140/90: ICD-10-CM

## 2020-05-04 RX ORDER — LOSARTAN POTASSIUM 50 MG/1
TABLET ORAL
Qty: 90 TABLET | Refills: 0 | Status: SHIPPED | OUTPATIENT
Start: 2020-05-04 | End: 2020-07-20

## 2020-05-04 NOTE — TELEPHONE ENCOUNTER
"Routing refill request to provider for review/approval because:  Requested Prescriptions   Pending Prescriptions Disp Refills    losartan (COZAAR) 50 MG tablet [Pharmacy Med Name: LOSARTAN POTASSIUM 50 MG Tablet] 90 tablet 1     Sig: TAKE 1 TABLET EVERY DAY       Angiotensin-II Receptors Failed - 5/3/2020  1:30 AM        Failed - Normal serum creatinine on file in past 12 months     Recent Labs   Lab Test 12/11/19  1018  02/22/19  1216   CR 0.48*   < >  --    CREAT  --   --  0.6    < > = values in this interval not displayed.       Ok to refill medication if creatinine is low          Passed - Last blood pressure under 140/90 in past 12 months     BP Readings from Last 3 Encounters:   12/11/19 134/66   10/30/19 132/84   05/15/19 130/80                 Passed - Recent (12 mo) or future (30 days) visit within the authorizing provider's specialty     Patient has had an office visit with the authorizing provider or a provider within the authorizing providers department within the previous 12 mos or has a future within next 30 days. See \"Patient Info\" tab in inbasket, or \"Choose Columns\" in Meds & Orders section of the refill encounter.              Passed - Medication is active on med list        Passed - Patient is age 18 or older        Passed - No active pregnancy on record        Passed - Normal serum potassium on file in past 12 months     Recent Labs   Lab Test 12/11/19  1018   POTASSIUM 3.6                    Passed - No positive pregnancy test in past 12 months                 Rubina JUAREZN, RN, CPN          "

## 2020-07-13 ENCOUNTER — TELEPHONE (OUTPATIENT)
Dept: FAMILY MEDICINE | Facility: CLINIC | Age: 85
End: 2020-07-13

## 2020-07-13 ENCOUNTER — VIRTUAL VISIT (OUTPATIENT)
Dept: FAMILY MEDICINE | Facility: OTHER | Age: 85
End: 2020-07-13

## 2020-07-13 NOTE — PROGRESS NOTES
"Date: 2020 11:22:07  Clinician: Brendan Niño  Clinician NPI: [id number]  Patient: Kate Isaac  Patient : 1935  Patient Address: [address] [place] [address]  Patient Phone: [phone]  Visit Protocol: URI  Patient Summary:  Kate is a 84 year old ( : 1935 ) female who initiated a Visit for COVID-19 (Coronavirus) evaluation and screening. When asked the question \"Please sign me up to receive news, health information and promotions. \", Kate responded \"No\".    Kate states her symptoms started today.   Her symptoms consist of. Kate also feels feverish.   Symptom details   Temperature: Her current temperature is 98 degrees Fahrenheit.    Kate denies having wheezing, nausea, teeth pain, ageusia, diarrhea, vomiting, rhinitis, malaise, ear pain, headache, chills, sore throat, enlarged lymph nodes, myalgias, anosmia, facial pain or pressure, cough, and nasal congestion. She also denies having recent facial or sinus surgery in the past 60 days and taking antibiotic medication in the past month. She is not experiencing dyspnea.    Pertinent COVID-19 (Coronavirus) information  In the past 14 days, Kate has not worked in a congregate living setting.   She does not work or volunteer as healthcare worker or a  and does not work or volunteer in a healthcare facility.   Kate also has not lived in a congregate living setting in the past 14 days. She does not live with a healthcare worker.   Kate has not had a close contact with a laboratory-confirmed COVID-19 patient within 14 days of symptom onset.   Pertinent medical history  Kate does not get yeast infections when she takes antibiotics.   Kate does not need a return to work/school note.   Weight: 129 lbs   Kate does not smoke or use smokeless tobacco.   Additional information as reported by the patient (free text): this morning at 9:30,  I went for to see Dr. Escoto at [facility]. My temp was 100.7, after 5 min. i " was 101, after 5 min it was 100.2.  I was told I couldn't see the doctor for two weeks.  Dr Waters has suggested I do this online   Weight: 129 lbs    MEDICATIONS: amlodipine oral, losartan oral, meloxicam oral, ALLERGIES: Sulfa (Sulfonamide Antibiotics)  Clinician Response:  Dear Kate,   Your symptoms show that you may have coronavirus (COVID-19). This illness can cause fever, cough and trouble breathing. Many people get a mild case and get better on their own. Some people can get very sick.  What should I do?  We would like to test you for this virus.   1. Please call [phone] to schedule your visit. Explain that you were referred by [facility] to have a COVID-19 test. [facility].  The following will serve as your written order for this COVID Test, ordered by me, for the indication of suspected COVID [Z20.828]: The test will be ordered in H[employer], our electronic health record, after you are scheduled. It will show as ordered and authorized by Jimmy Haynes MD.  Order: COVID-19 (Coronavirus) PCR for SYMPTOMATIC testing from [facility].      2. When it's time for your COVID test:  Stay at least 6 feet away from others. (If someone will drive you to your test, stay in the backseat, as far away from the  as you can.)   Cover your mouth and nose with a mask, tissue or washcloth.  Go straight to the testing site. Don't make any stops on the way there or back.      3.Starting now: Stay home and away from others (self-isolate) until:   You've had no fever---and no medicine that reduces fever---for 3 full days (72 hours). And...   Your other symptoms have gotten better. For example, your cough or breathing has improved. And...   At least 10 days have passed since your symptoms started.       During this time, don't leave the house except for testing or medical care.   Stay in your own room, even for meals. Use your own bathroom if you can.   Stay away from others in your home. No hugging,  "kissing or shaking hands. No visitors.  Don't go to work, school or anywhere else.    Clean \"high touch\" surfaces often (doorknobs, counters, handles, etc.). Use a household cleaning spray or wipes. You'll find a full list of  on the EPA website: www.epa.gov/pesticide-registration/list-n-disinfectants-use-against-sars-cov-2.   Cover your mouth and nose with a mask, tissue or washcloth to avoid spreading germs.  Wash your hands and face often. Use soap and water.  Caregivers in these groups are at risk for severe illness due to COVID-19:  o People 65 years and older  o People who live in a nursing home or long-term care facility  o People with chronic disease (lung, heart, cancer, diabetes, kidney, liver, immunologic)  o People who have a weakened immune system, including those who:   Are in cancer treatment  Take medicine that weakens the immune system, such as corticosteroids  Had a bone marrow or organ transplant  Have an immune deficiency  Have poorly controlled HIV or AIDS  Are obese (body mass index of 40 or higher)  Smoke regularly   o Caregivers should wear gloves while washing dishes, handling laundry and cleaning bedrooms and bathrooms.  o Use caution when washing and drying laundry: Don't shake dirty laundry, and use the warmest water setting that you can.  o For more tips, go to www.cdc.gov/coronavirus/2019-ncov/downloads/10Things.pdf.    4.Sign up for youmag. We know it's scary to hear that you might have COVID-19. We want to track your symptoms to make sure you're okay over the next 2 weeks. Please look for an email from youmag---this is a free, online program that we'll use to keep in touch. To sign up, follow the link in the email. Learn more at [email or web address]  How can I take care of myself?   Get lots of rest. Drink extra fluids (unless a doctor has told you not to).   Take Tylenol (acetaminophen) for fever or pain. If you have liver or kidney problems, ask your " family doctor if it's okay to take Tylenol.   Adults can take either:    650 mg (two 325 mg pills) every 4 to 6 hours, or...   1,000 mg (two 500 mg pills) every 8 hours as needed.    Note: Don't take more than 3,000 mg in one day. Acetaminophen is found in many medicines (both prescribed and over-the-counter medicines). Read all labels to be sure you don't take too much.   For children, check the Tylenol bottle for the right dose. The dose is based on the child's age or weight.    If you have other health problems (like cancer, heart failure, an organ transplant or severe kidney disease): Call your specialty clinic if you don't feel better in the next 2 days.       Know when to call 911. Emergency warning signs include:    Trouble breathing or shortness of breath Pain or pressure in the chest that doesn't go away Feeling confused like you haven't felt before, or not being able to wake up Bluish-colored lips or face.  Where can I get more information?   [facility] -- About COVID-19: [email or web address]   CDC -- What to Do If You're Sick: www.cdc.gov/coronavirus/2019-ncov/about/steps-when-sick.html   CDC -- Ending Home Isolation: www.cdc.gov/coronavirus/2019-ncov/hcp/disposition-in-home-patients.html   CDC -- Caring for Someone: www.cdc.gov/coronavirus/2019-ncov/if-you-are-sick/care-for-someone.html   [facility] -- Interim Guidance for Hospital Discharge to Home: www.health.[facility].mn.us/diseases/coronavirus/hcp/hospdischarge.pdf   [facility] clinical trials (COVID-19 research studies): clinicalaffairs.[facility].edu/umn-clinical-trials    Below are the COVID-19 hotlines at the [place] Department of Health ([facility]). Interpreters are available.    For health questions: Call 367-869-6786 or 1-882.336.3775 (7 a.m. to 7 p.m.) For questions about schools and childcare: Call 700-233-9101 or 1-127.776.4883 (7 a.m. to 7 p.m.)    Diagnosis: Chills (without fever)  Diagnosis ICD: R68.83

## 2020-07-13 NOTE — TELEPHONE ENCOUNTER
Reason for Call:  Other fever    Detailed comments: patient went to appointment this AM for ortho had a fever of 101 could not be seen since she has this fever patient is wondering if she needs to be tested or just stay home for 14 days.   Please call to advice  Thank you     Phone Number Patient can be reached at: Home number on file [phone] (home)    Best Time: any    Can we leave a detailed message on this number? YES    Call taken on 7/13/2020 at 10:34 AM by Olga Weller

## 2020-07-13 NOTE — TELEPHONE ENCOUNTER
Returned call to patient.     She went into an orthopedics appointment today and they took her temperature 3 times. The highest was 101.0.  Patient is asymptomatic currently except for the fever.  She is feeling well.   Patient wondering if she should be tested for covid or just quarantine for 2 weeks.   This RN advised she submit an [facility] appointment to be screened and follow their recommendations.   She has a computer at home and will do this now.     Massiel JUAREZN, RN

## 2020-07-15 DIAGNOSIS — Z20.822 SUSPECTED COVID-19 VIRUS INFECTION: Primary | ICD-10-CM

## 2020-07-15 PROCEDURE — U0003 INFECTIOUS AGENT DETECTION BY NUCLEIC ACID (DNA OR RNA); SEVERE ACUTE RESPIRATORY SYNDROME CORONAVIRUS 2 (SARS-COV-2) (CORONAVIRUS DISEASE [COVID-19]), AMPLIFIED PROBE TECHNIQUE, MAKING USE OF HIGH THROUGHPUT TECHNOLOGIES AS DESCRIBED BY CMS-2020-01-R: HCPCS | Performed by: FAMILY MEDICINE

## 2020-07-15 NOTE — LETTER
July 20, 2020        Kate Isaac  [address] [facility] [address]    This letter provides a written record that you were tested for COVID-19 on 7/15/20.       Your result was negative. This means that we didn t find the virus that causes COVID-19 in your sample. A test may show negative when you do actually have the virus. This can happen when the virus is in the early stages of infection, before you feel illness symptoms.    If you have symptoms   Stay home and away from others (self-isolate) until you meet ALL of the guidelines below:    You ve had no fever--and no medicine that reduces fever--for 3 full days (72 hours). And      Your other symptoms have gotten better. For example, your cough or breathing has improved. And     At least 10 days have passed since your symptoms started.    During this time:    Stay home. Don t go to work, school or anywhere else.     Stay in your own room, including for meals. Use your own bathroom if you can.    Stay away from others in your home. No hugging, kissing or shaking hands. No visitors.    Clean  high touch  surfaces often (doorknobs, counters, handles, etc.). Use a household cleaning spray or wipes. You can find a full list on the EPA website at www.epa.gov/pesticide-registration/list-n-disinfectants-use-against-sars-cov-2.    Cover your mouth and nose with a mask, tissue or washcloth to avoid spreading germs.    Wash your hands and face often with soap and water.    Going back to work  Check with your employer for any guidelines to follow for going back to work.    Employers: This document serves as formal notice that your employee tested negative for COVID-19, as of the testing date shown above.

## 2020-07-16 LAB
SARS-COV-2 RNA SPEC QL NAA+PROBE: NOT DETECTED
SPECIMEN SOURCE: NORMAL

## 2020-07-19 DIAGNOSIS — I10 HYPERTENSION GOAL BP (BLOOD PRESSURE) < 140/90: ICD-10-CM

## 2020-07-19 NOTE — LETTER
July 20, 2020    Kate Isaac  [address] [facility] [address]    Dear Kate,       We recently received a refill request for losartan (COZAAR) 50 MG tablet .  We have refilled this for a one time 90 day supply only because you are due for a:     lab appointment        Please call at your earliest convenience so that there will not be a delay with your future refills.        Thank you,   Your [facility] Team/sp  [phone]

## 2020-07-20 RX ORDER — LOSARTAN POTASSIUM 50 MG/1
TABLET ORAL
Qty: 90 TABLET | Refills: 1 | Status: SHIPPED | OUTPATIENT
Start: 2020-07-20 | End: 2020-12-09

## 2020-07-20 NOTE — TELEPHONE ENCOUNTER
Routing refill request to provider for review/approval because:  Labs not current:  Creatinine    Massiel Tuttle BSN, RN

## 2020-07-24 DIAGNOSIS — I10 HYPERTENSION GOAL BP (BLOOD PRESSURE) < 140/90: ICD-10-CM

## 2020-07-24 LAB
ANION GAP SERPL CALCULATED.3IONS-SCNC: 6 MMOL/L (ref 3–14)
BUN SERPL-MCNC: 15 MG/DL (ref 7–30)
CALCIUM SERPL-MCNC: 8.9 MG/DL (ref 8.5–10.1)
CHLORIDE SERPL-SCNC: 104 MMOL/L (ref 94–109)
CO2 SERPL-SCNC: 28 MMOL/L (ref 20–32)
CREAT SERPL-MCNC: 0.53 MG/DL (ref 0.52–1.04)
GFR SERPL CREATININE-BSD FRML MDRD: 88 ML/MIN/{1.73_M2}
GLUCOSE SERPL-MCNC: 93 MG/DL (ref 70–99)
POTASSIUM SERPL-SCNC: 4.2 MMOL/L (ref 3.4–5.3)
SODIUM SERPL-SCNC: 138 MMOL/L (ref 133–144)

## 2020-07-24 PROCEDURE — 36415 COLL VENOUS BLD VENIPUNCTURE: CPT | Performed by: FAMILY MEDICINE

## 2020-07-24 PROCEDURE — 80048 BASIC METABOLIC PNL TOTAL CA: CPT | Performed by: FAMILY MEDICINE

## 2020-07-27 ENCOUNTER — TRANSFERRED RECORDS (OUTPATIENT)
Dept: HEALTH INFORMATION MANAGEMENT | Facility: CLINIC | Age: 85
End: 2020-07-27

## 2020-07-31 ENCOUNTER — TRANSFERRED RECORDS (OUTPATIENT)
Dept: HEALTH INFORMATION MANAGEMENT | Facility: CLINIC | Age: 85
End: 2020-07-31

## 2020-08-05 DIAGNOSIS — I10 ESSENTIAL HYPERTENSION WITH GOAL BLOOD PRESSURE LESS THAN 140/90: Primary | ICD-10-CM

## 2020-08-05 DIAGNOSIS — M17.12 PRIMARY OSTEOARTHRITIS OF LEFT KNEE: ICD-10-CM

## 2020-08-05 NOTE — LETTER
August 6, 2020    Kate Isaac  [address] [facility] [address]    Dear Kate,       We recently received a refill request for meloxicam (MOBIC) 15 MG tablet .  We have refilled this for a one time 90 day supply only because you are due for a:    Non-Fasting lab appointment Due Now  This can be done In Clinic or at our Drive up Tent in the parking lot      Please call [phone] at your earliest convenience so that there will not be a delay with your future refills.        Thank you,   Your [facility] Team/mkr  [phone]

## 2020-08-06 RX ORDER — MELOXICAM 15 MG/1
TABLET ORAL
Qty: 90 TABLET | Refills: 1 | Status: SHIPPED | OUTPATIENT
Start: 2020-08-06 | End: 2021-01-11

## 2020-08-06 NOTE — TELEPHONE ENCOUNTER
"Requested Prescriptions   Pending Prescriptions Disp Refills    meloxicam (MOBIC) 15 MG tablet [Pharmacy Med Name: MELOXICAM 15 MG Tablet] 90 tablet 1     Sig: TAKE 1 TABLET EVERY DAY       NSAID Medications Failed - 8/5/2020  9:26 PM        Failed - Normal ALT on file in past 12 months     Recent Labs   Lab Test 02/22/19  1259   ALT 17             Failed - Normal AST on file in past 12 months     Recent Labs   Lab Test 02/22/19  1259   AST 17             Failed - Patient is age 6-64 years        Failed - Normal CBC on file in past 12 months     Recent Labs   Lab Test 02/22/19  1259   WBC 6.7   RBC 4.42   HGB 12.6   HCT 39.3                    Passed - Blood pressure under 140/90 in past 12 months     BP Readings from Last 3 Encounters:   12/11/19 134/66   10/30/19 132/84   05/15/19 130/80                 Passed - Recent (12 mo) or future (30 days) visit within the authorizing provider's specialty     Patient has had an office visit with the authorizing provider or a provider within the authorizing providers department within the previous 12 mos or has a future within next 30 days. See \"Patient Info\" tab in inbasket, or \"Choose Columns\" in Meds & Orders section of the refill encounter.              Passed - Medication is active on med list        Passed - No active pregnancy on record        Passed - Normal serum creatinine on file in past 12 months     Recent Labs   Lab Test 07/24/20  0804  02/22/19  1216   CR 0.53   < >  --    CREAT  --   --  0.6    < > = values in this interval not displayed.       Ok to refill medication if creatinine is low          Passed - No positive pregnancy test in past 12 months             "

## 2020-08-11 ENCOUNTER — TELEPHONE (OUTPATIENT)
Dept: FAMILY MEDICINE | Facility: CLINIC | Age: 85
End: 2020-08-11

## 2020-08-11 NOTE — TELEPHONE ENCOUNTER
Reason for Call:  Other prescription    Detailed comments: pt renewed a medication and a lab was required but she had a lab done on 7/24 and she was wondering if those results could be used.    Phone Number Patient can be reached at: Home number on file [phone] (home)    Best Time: any      Can we leave a detailed message on this number? YES    Call taken on 8/11/2020 at 2:37 PM by Vianney South

## 2020-08-11 NOTE — TELEPHONE ENCOUNTER
No, the lab tests done 7/24/20 are not the ones Dr Waters currently needs her to have done.     We refilled her medication for 90 days so she should have the labs done a few days before she sees Dr Waters 9/24/20.    Massiel Tuttle BSN, RN

## 2020-08-12 NOTE — TELEPHONE ENCOUNTER
LM informing patient of below message. Please assist patient in scheduling lab appointment. Tatyana Gamino TC/Pt Rep

## 2020-08-13 NOTE — TELEPHONE ENCOUNTER
Called and spoke to patient. She wants her pre-visit labs and the labs she needs for the meloxicam (MOBIC) , done at the same lab appointment. Please place labs for the meloxicam (MOBIC) , if not already ordered as future.Sheila Nielsen MA/TC

## 2020-08-13 NOTE — TELEPHONE ENCOUNTER
Patient wants to know if she can get  Labs done when she comes in next month for her physical. She will be fasting.Will this be ok for the test she needs? Ok to leave a detailed message.

## 2020-08-14 NOTE — TELEPHONE ENCOUNTER
Lab orders are in  Her last physical was 12/11/19 si she may be to early for her yearly physical depending on her insurance.    Lani Valencia MD

## 2020-08-14 NOTE — TELEPHONE ENCOUNTER
Yes it can cause stomach ulcers and be hard on your kidneys which is why I want the labwork. Please have her keep her lab appt.    Lani Valencia MD

## 2020-08-14 NOTE — TELEPHONE ENCOUNTER
Called the patient @ [phone], I gave her Dr. Licea's message that it may be too soon for her Wellness visit due to the fact her last one was 12/11/19 of last year. Patient did not realize that, so we have canceled that appt until the appropriate time.      Patient has a question about taking the medication,  Meloxicam (MOBIC) long term as she read it can cause problems. Needs advisement please. And also, will she need to keep her lab appt. (9/16/20)  for medication refills or any other reason?      Thank you,  Brigitte Liriano TC

## 2020-08-14 NOTE — TELEPHONE ENCOUNTER
Called patient and gave providers message/ instructions.  Patient states (s)he understands this.     Massiel JUAREZN, RN

## 2020-08-21 ENCOUNTER — MYC MEDICAL ADVICE (OUTPATIENT)
Dept: FAMILY MEDICINE | Facility: CLINIC | Age: 85
End: 2020-08-21

## 2020-08-31 ENCOUNTER — OFFICE VISIT (OUTPATIENT)
Dept: FAMILY MEDICINE | Facility: CLINIC | Age: 85
End: 2020-08-31
Payer: COMMERCIAL

## 2020-08-31 VITALS
SYSTOLIC BLOOD PRESSURE: 138 MMHG | OXYGEN SATURATION: 97 % | TEMPERATURE: 98.1 F | BODY MASS INDEX: 23.57 KG/M2 | HEIGHT: 63 IN | DIASTOLIC BLOOD PRESSURE: 78 MMHG | WEIGHT: 133 LBS | HEART RATE: 84 BPM

## 2020-08-31 DIAGNOSIS — M25.511 ACUTE PAIN OF RIGHT SHOULDER: Primary | ICD-10-CM

## 2020-08-31 PROCEDURE — 99213 OFFICE O/P EST LOW 20 MIN: CPT | Performed by: NURSE PRACTITIONER

## 2020-08-31 ASSESSMENT — MIFFLIN-ST. JEOR: SCORE: 1032.41

## 2020-08-31 NOTE — PROGRESS NOTES
"Subjective     Kate Isaac is a 84 year old female who presents to clinic today for the following health issues:    HPI       Musculoskeletal problem/pain  Onset/Duration: 4 weeks  Description  Location: arm - right  Joint Swelling: no  Redness: no  Pain: YES  Warmth: no  Intensity:  moderate  Progression of Symptoms:  worsening  Accompanying signs and symptoms:   Fevers: no  Numbness/tingling/weakness: no  History  Trauma to the area: helped  move boat,   Recent illness:  no  Previous similar problem: no  Previous evaluation:  no  Precipitating or alleviating factors:  Aggravating factors include: overuse  Therapies tried and outcome: wears a sleeve for lymphodema    Patient reports right upper arm pain x 4 weeks.  This started after helping her  pull a boat out of the garage.  Pain is worse with movement.  Pain is located in her upper arm and shoulder area.  She denies any obvious swelling, redness, or warmth.  No numbness or tingling.  No recent fever, chills, malaise or fatigue.  She has lymphedema in this arm due to history of right breast cancer treatment.  She denies any worsening of her chronic lymphedema, which is mild.  She does wear a lymphedema sleeve.  She takes meloxicam 15 mg chronically for history of osteoarthritis in her joints.  With this recent new pain in her arm she has also tried Tylenol, the maximum dose she has taken is 500 mg once daily.  She purchased some topical Voltaren gel, but was apprehensive to use it without advice.  She has not tried ice or heat for the pain.    Review of Systems   Constitutional, HEENT, cardiovascular, pulmonary, gi and gu systems are negative, except as otherwise noted.      Objective    /78   Pulse 84   Temp 98.1  F (36.7  C)   Ht 1.6 m (5' 3\")   Wt 60.3 kg (133 lb)   SpO2 97%   BMI 23.56 kg/m    Body mass index is 23.56 kg/m .     Exam:  GENERAL: healthy, alert and no distress  NECK: no adenopathy, no asymmetry, masses, or scars " and thyroid normal to palpation  RESP: lungs clear to auscultation - no rales, rhonchi or wheezes  CV: regular rate and rhythm, normal S1 S2, no S3 or S4, no murmur, click or rub, no peripheral edema and peripheral pulses strong  MS: extremities normal- no gross deformities noted  Shoulder Musculoskeletal Exam     Inspection      Inspection - Right        Right shoulder inspection is normal.    Palpation      Palpation - Right        Crepitus: no crepitus      Increased warmth: none      Tenderness: present        Anterior shoulder: mild        Posterior shoulder: none        Clavicle: mild        AC joint: moderate    Strength      Strength - Right        Right shoulder strength is normal.    Neurovascular      Neurovascular - Right        Radial pulse: normal      Capillary refill: brisk    SKIN: no suspicious lesions or rashes  NEURO: Normal strength and tone, mentation intact and speech normal  PSYCH: mentation appears normal, affect normal/bright          Assessment & Plan     Kate was seen today for musculoskeletal problem.    Diagnoses and all orders for this visit:    Acute pain of right shoulder      I think the pain in her right upper arm is differed from the shoulder.  She was quite tender at the AC joint and with range of motion of her shoulder.  Suspect inflammation from arthritis versus tendinitis versus bursitis.  I have asked her to alternate heat and ice.  Also recommended she increase Tylenol to 1000 mg 3 times daily as needed.  She can continue the meloxicam that she takes chronically.  She could also use the Voltaren gel very sparingly in this area, she has already purchased OTC.  If she does not improve with these measures consider referral to physical therapy and or orthopedics.    See Patient Instructions  Patient Instructions   Right shoulder pain-  Likely from inflammation from arthritis or overuse.   Continue Meloxicam.   You can take Extra Strength Tylenol 1000 mg (2 tabs) up to 3 x daily  as needed.    Okay to use Voltaren gel sparingly.   Alternate heat and ice over the shoulder, no more than 15 minutes at a time.   If not improving, we could consider physical therapy and/or referral to orthopedics.          Return in about 2 weeks (around 9/14/2020) for If failure to improve.    Missy Siegel, [facility]

## 2020-08-31 NOTE — PATIENT INSTRUCTIONS
Right shoulder pain-  Likely from inflammation from arthritis or overuse.   Continue Meloxicam.   You can take Extra Strength Tylenol 1000 mg (2 tabs) up to 3 x daily as needed.    Okay to use Voltaren gel sparingly.   Alternate heat and ice over the shoulder, no more than 15 minutes at a time.   If not improving, we could consider physical therapy and/or referral to orthopedics.

## 2020-09-16 DIAGNOSIS — M17.12 PRIMARY OSTEOARTHRITIS OF LEFT KNEE: ICD-10-CM

## 2020-09-16 DIAGNOSIS — I10 ESSENTIAL HYPERTENSION WITH GOAL BLOOD PRESSURE LESS THAN 140/90: ICD-10-CM

## 2020-09-16 LAB
ALBUMIN SERPL-MCNC: 4 G/DL (ref 3.4–5)
ALP SERPL-CCNC: 83 U/L (ref 40–150)
ALT SERPL W P-5'-P-CCNC: 19 U/L (ref 0–50)
ANION GAP SERPL CALCULATED.3IONS-SCNC: 4 MMOL/L (ref 3–14)
AST SERPL W P-5'-P-CCNC: 18 U/L (ref 0–45)
BILIRUB SERPL-MCNC: 0.9 MG/DL (ref 0.2–1.3)
BUN SERPL-MCNC: 14 MG/DL (ref 7–30)
CALCIUM SERPL-MCNC: 9 MG/DL (ref 8.5–10.1)
CHLORIDE SERPL-SCNC: 105 MMOL/L (ref 94–109)
CO2 SERPL-SCNC: 29 MMOL/L (ref 20–32)
CREAT SERPL-MCNC: 0.52 MG/DL (ref 0.52–1.04)
ERYTHROCYTE [DISTWIDTH] IN BLOOD BY AUTOMATED COUNT: 13.3 % (ref 10–15)
GFR SERPL CREATININE-BSD FRML MDRD: 88 ML/MIN/{1.73_M2}
GLUCOSE SERPL-MCNC: 96 MG/DL (ref 70–99)
HCT VFR BLD AUTO: 40.5 % (ref 35–47)
HGB BLD-MCNC: 13.3 G/DL (ref 11.7–15.7)
MCH RBC QN AUTO: 29.7 PG (ref 26.5–33)
MCHC RBC AUTO-ENTMCNC: 32.8 G/DL (ref 31.5–36.5)
MCV RBC AUTO: 90 FL (ref 78–100)
PLATELET # BLD AUTO: 311 10E9/L (ref 150–450)
POTASSIUM SERPL-SCNC: 4.4 MMOL/L (ref 3.4–5.3)
PROT SERPL-MCNC: 7.6 G/DL (ref 6.8–8.8)
RBC # BLD AUTO: 4.48 10E12/L (ref 3.8–5.2)
SODIUM SERPL-SCNC: 138 MMOL/L (ref 133–144)
WBC # BLD AUTO: 5.3 10E9/L (ref 4–11)

## 2020-09-16 PROCEDURE — 80053 COMPREHEN METABOLIC PANEL: CPT | Performed by: FAMILY MEDICINE

## 2020-09-16 PROCEDURE — 36415 COLL VENOUS BLD VENIPUNCTURE: CPT | Performed by: FAMILY MEDICINE

## 2020-09-16 PROCEDURE — 85027 COMPLETE CBC AUTOMATED: CPT | Performed by: FAMILY MEDICINE

## 2020-09-25 ENCOUNTER — THERAPY VISIT (OUTPATIENT)
Dept: PHYSICAL THERAPY | Facility: CLINIC | Age: 85
End: 2020-09-25
Payer: COMMERCIAL

## 2020-09-25 ENCOUNTER — TELEPHONE (OUTPATIENT)
Dept: ORTHOPEDICS | Facility: CLINIC | Age: 85
End: 2020-09-25

## 2020-09-25 ENCOUNTER — OFFICE VISIT (OUTPATIENT)
Dept: ORTHOPEDICS | Facility: CLINIC | Age: 85
End: 2020-09-25
Payer: COMMERCIAL

## 2020-09-25 ENCOUNTER — ANCILLARY PROCEDURE (OUTPATIENT)
Dept: GENERAL RADIOLOGY | Facility: CLINIC | Age: 85
End: 2020-09-25
Attending: PEDIATRICS
Payer: COMMERCIAL

## 2020-09-25 VITALS
DIASTOLIC BLOOD PRESSURE: 76 MMHG | BODY MASS INDEX: 23.57 KG/M2 | HEIGHT: 63 IN | WEIGHT: 133 LBS | SYSTOLIC BLOOD PRESSURE: 142 MMHG

## 2020-09-25 DIAGNOSIS — M25.511 ACUTE PAIN OF RIGHT SHOULDER: Primary | ICD-10-CM

## 2020-09-25 DIAGNOSIS — M25.511 ACUTE PAIN OF RIGHT SHOULDER: ICD-10-CM

## 2020-09-25 DIAGNOSIS — M75.101 ROTATOR CUFF SYNDROME OF RIGHT SHOULDER: ICD-10-CM

## 2020-09-25 PROCEDURE — 97110 THERAPEUTIC EXERCISES: CPT | Mod: GP | Performed by: PHYSICAL THERAPIST

## 2020-09-25 PROCEDURE — 20610 DRAIN/INJ JOINT/BURSA W/O US: CPT | Mod: RT | Performed by: PEDIATRICS

## 2020-09-25 PROCEDURE — 97161 PT EVAL LOW COMPLEX 20 MIN: CPT | Mod: GP | Performed by: PHYSICAL THERAPIST

## 2020-09-25 PROCEDURE — 99214 OFFICE O/P EST MOD 30 MIN: CPT | Mod: 25 | Performed by: PEDIATRICS

## 2020-09-25 PROCEDURE — 73030 X-RAY EXAM OF SHOULDER: CPT | Mod: RT

## 2020-09-25 RX ORDER — TRIAMCINOLONE ACETONIDE 40 MG/ML
40 INJECTION, SUSPENSION INTRA-ARTICULAR; INTRAMUSCULAR
Status: SHIPPED | OUTPATIENT
Start: 2020-09-25

## 2020-09-25 RX ORDER — LIDOCAINE HYDROCHLORIDE 10 MG/ML
2 INJECTION, SOLUTION INFILTRATION; PERINEURAL
Status: SHIPPED | OUTPATIENT
Start: 2020-09-25

## 2020-09-25 RX ADMIN — LIDOCAINE HYDROCHLORIDE 2 ML: 10 INJECTION, SOLUTION INFILTRATION; PERINEURAL at 11:32

## 2020-09-25 RX ADMIN — TRIAMCINOLONE ACETONIDE 40 MG: 40 INJECTION, SUSPENSION INTRA-ARTICULAR; INTRAMUSCULAR at 11:32

## 2020-09-25 ASSESSMENT — MIFFLIN-ST. JEOR: SCORE: 1032.41

## 2020-09-25 NOTE — PROGRESS NOTES
Sports Medicine Clinic Visit    PCP: Lani Valencia Marlin is a 84 year old female who is seen  as a self referral presenting with right shoulder pain.  Pain has been present for about 2 months.  Pain began in her biceps and triceps, but now has pain with shoulder motion.  HX of lymphedema on her right side following breast cancer treatments.   Does have any appointment later today to start therapy.    She is right hand dominant.    Has been using meloxicam     Injury: gradual onset   **  Notes shoulder and upper arm pain with use.  Not much pain with golfing, but more pain afterward.  + pain at rest currently; notes lateral shoulder and upper arm.    Pain with lying on right side.      Location of Pain: right shoulder   Duration of Pain: 2 month(s)  Rating of Pain at worst: 8/10  Rating of Pain Currently: 6/10  Symptoms are better with: Tylenol and Rest  Symptoms are worse with: use   Additional Features:   Positive: grinding   Negative: swelling, bruising, popping, catching, locking, instability, paresthesias, numbness, weakness, pain in other joints and systemic symptoms  Other evaluation and/or treatments so far consists of: Nothing  Prior History of related problems: denies     Social History: retired     Review of Systems  Musculoskeletal: as above  Remainder of review of systems is negative including constitutional, CV, pulmonary, GI, Skin and Neurologic except as noted in HPI or medical history.    Past Medical History:   Diagnosis Date     Arthritis 02/01/2014    Per pt had it since FEB     Basal cell cancer     sees dermatologist     Breast cancer (H) 1996    invasive ductal     HTN      Osteopenia      Recurrent UTI      Past Surgical History:   Procedure Laterality Date     BIOPSY  1996 2004,2010    basal cell cancers     BUNIONECTOMY RT/LT       [facility] AFF SURGICAL PATHOLOGY      ganglion cyst removal     COLONOSCOPY       GENITOURINARY SURGERY       HC EXCISION BREAST LESION, OPEN >=1       right breast     SLING TRANSVAGINAL  2013    Procedure: SLING TRANSVAGINAL;  Cysto with TVT;  Surgeon: Denia Shultz MD;  Location: [facility] OR     SURGICAL HISTORY OF -       right thigh tumor removal/benign     Family History   Problem Relation Age of Onset     Heart Disease Mother         cad at age 70s     Cancer Mother         KIDNEY     Diabetes Mother      Coronary Artery Disease Mother      Hypertension Mother      Heart Disease Father         chf     Neurologic Disorder Father         PARKINSONS     Alzheimer Disease Father      Heart Disease Maternal Grandmother         chf     Coronary Artery Disease Maternal Grandmother      Cancer Maternal Grandfather         ?     Heart Disease Paternal Grandmother      Coronary Artery Disease Paternal Grandmother      Heart Disease Paternal Grandfather      Neurologic Disorder Paternal Grandfather         PARKINSONS     Blood Disease Brother         LEUKEMIA     Breast Cancer Other      Diabetes Other         Mother's sister's daughter     Cancer Daughter         CERVICAL     Cancer Other         THYROID     Cerebrovascular Disease Other      Breast Cancer Daughter      Social History     Socioeconomic History     Marital status:      Spouse name: Not on file     Number of children: Not on file     Years of education: Not on file     Highest education level: Not on file   Occupational History     Not on file   Social Needs     Financial resource strain: Not on file     Food insecurity     Worry: Not on file     Inability: Not on file     Transportation needs     Medical: Not on file     Non-medical: Not on file   Tobacco Use     Smoking status: Former Smoker     Packs/day: 1.00     Years: 10.00     Pack years: 10.00     Types: Cigarettes     Start date: 1956     Last attempt to quit: 1985     Years since quittin.7     Smokeless tobacco: Never Used     Tobacco comment: I stopped smoking several times from  to    Substance and  "Sexual Activity     Alcohol use: Yes     Alcohol/week: 2.5 - 3.3 standard drinks     Comment: Red wine     Drug use: No     Sexual activity: Yes     Partners: Male     Birth control/protection: Post-menopausal, Female Surgical     Comment: Tubal in 1965 & now I am post-menopausal   Lifestyle     Physical activity     Days per week: Not on file     Minutes per session: Not on file     Stress: Not on file   Relationships     Social connections     Talks on phone: Not on file     Gets together: Not on file     Attends Mandaen service: Not on file     Active member of club or organization: Not on file     Attends meetings of clubs or organizations: Not on file     Relationship status: Not on file     Intimate partner violence     Fear of current or ex partner: Not on file     Emotionally abused: Not on file     Physically abused: Not on file     Forced sexual activity: Not on file   Other Topics Concern      Service No     Blood Transfusions No     Caffeine Concern No     Occupational Exposure No     Hobby Hazards No     Sleep Concern No     Stress Concern No     Weight Concern No     Special Diet No     Back Care No     Exercise No     Bike Helmet No     Seat Belt No     Self-Exams No     Parent/sibling w/ CABG, MI or angioplasty before 65F 55M? No   Social History Narrative     Not on file       Objective  BP (!) 142/76   Ht 1.6 m (5' 3\")   Wt 60.3 kg (133 lb)   BMI 23.56 kg/m      GENERAL APPEARANCE: healthy, alert and no distress   GAIT: NORMAL  SKIN: no suspicious lesions or rashes  NEURO: Normal strength and tone, mentation intact and speech normal  PSYCH:  mentation appears normal and affect normal/bright  HEENT: no scleral icterus  CV: distal perfusion intact  RESP: nonlabored breathing      Right Shoulder exam    ROM:        forward flexion grossly full, painful arc        abduction lacking 5-10 deg compared to left, pain at/above shoulder level       internal rotation mild limitation       " external rotation mild limitation  Pain also with ER, less with IR    Strength:        abduction 4+/5       internal rotation 5/5       external rotation 4/5  Pain with ER    Impingement testing:        positive (+) Huggins       positive (+) empty can, mild, able to resist    Skin:       no visible deformities       well perfused       capillary refill brisk    Sensation:        normal sensation over shoulder and upper extremity       Radiology  Visualized radiographs as noted below, and reviewed the images with the patient; if the report was available at the time of the visit, the report was reviewed as well.  AC arthrosis. Small calcification noted, appears chronic.    Recent Results (from the past 24 hour(s))   XR Shoulder Right G/E 3 Views    Narrative    RIGHT SHOULDER THREE OR MORE VIEWS   9/25/2020 11:01 AM     HISTORY: Acute pain of right shoulder.    COMPARISON: None.      Impression    IMPRESSION: Moderate acromioclavicular degenerative changes.  Glenohumeral joint is maintained. Small calcification inferior to the  acromioclavicular joint is likely soft tissue calcification or area of  calcific tendinitis. No evidence of fracture.    ELIZABETH HUNT MD         Assessment:  1. Acute pain of right shoulder    2. Rotator cuff syndrome of right shoulder        Plan:  Discussed the assessment with the patient.  Consistent with cuff source, more impingement. May have some component calcific tendinitis.  We discussed the following: symptom treatment, activity modification/rest, imaging, rehab and injection therapy. Following discussion, plan:    Topical Treatments: Ice, Heat or Topical Analgesics as needed  Over the counter medication: PRN  X-ray of the shoulder reviewed. We discussed consideration of additional imaging of the affected area, but this is not required currently given assessment and plan for other intervention.  Activity Modification: reviewed  Rehab: Physical Therapy: planned.  Has visit arranged  already.  Steroid injection of the right shoulder: subacromial space was performed today in clinic. Discussed risks, benefits, and alternatives to injection, including during current the viral outbreak. The patient understands and desires to proceed with injection.  Icing for the next 1-2 days may be helpful for pain. Injection may take 10-14 days to see the full effect.  Follow up: 1 month if not improving with injection therapy, sooner if needed.  Questions answered. Discussed signs and symptoms that may indicate more serious issues; the patient was instructed to seek appropriate care if noted. Kate indicates understanding of these issues and agrees with the plan.      Casey Lucia DO, JOHN          Large Joint Injection/Arthocentesis: R subacromial bursa    Date/Time: 9/25/2020 11:32 AM  Performed by: Casey Lucia DO  Authorized by: Casey Lucia DO     Indications:  Pain  Needle Size:  25 G  Guidance: landmark guided    Approach:  Anterolateral  Location:  Shoulder      Site:  R subacromial bursa  Medications:  2 mL lidocaine 1 %; 40 mg triamcinolone 40 MG/ML  Outcome:  Tolerated well, no immediate complications  Procedure discussed: discussed risks, benefits, and alternatives    Consent Given by:  Patient  Timeout: timeout called immediately prior to procedure    Prep: patient was prepped and draped in usual sterile fashion          This note consists of symbols derived from keyboarding, dictation and/or voice recognition software. As a result, there may be errors in the script that have gone undetected. Please consider this when interpreting information found in this chart.

## 2020-09-25 NOTE — TELEPHONE ENCOUNTER
Patient was seen by PT after her appt today and wondering if PT order can be placed for Rt shoulder.

## 2020-09-25 NOTE — PATIENT INSTRUCTIONS
Kate to follow up with Primary Care provider regarding elevated blood pressure.          Injection Discharge Instructions      You may shower, however avoid swimming, tub baths or hot tubs for 24 hours following your procedure    You may have a mild to moderate increase in pain for several days following the injection.    It may take up to 14 days for the steroid medication to start working although you may feel the effect as early as a few days after the procedure.    You may use ice packs for 10-15 minutes, 3 to 4 times a day at the injection site for comfort    You may use anti-inflammatory medications (such as Ibuprofen or Aleve or Advil) if you are able to take safely, or acetaminophen (Tylenol) for pain control if necessary    If you were fasting, you may resume your normal diet and medications after the procedure    If you have diabetes, check your blood sugar more frequently than usual as your blood sugar may be higher than normal for 10-14 days following a steroid injection. Contact your doctor who manages your diabetes if your blood sugar is higher than usual      If you experience any of the following, call the clinic @ [phone]  - Fever over 100 degree F  - Swelling, bleeding, redness, drainage, warmth at the injection site  - New or worsening pain

## 2020-09-25 NOTE — LETTER
9/25/2020         RE: Kate Isaac  [address] [facility] [address]        Dear Colleague,    Thank you for referring your patient, Kate Isaac, to the [facility] [place]. Please see a copy of my visit note below.    Sports Medicine Clinic Visit    PCP: Lani Valencia    Kate Isaac is a 84 year old female who is seen  as a self referral presenting with right shoulder pain.  Pain has been present for about 2 months.  Pain began in her biceps and triceps, but now has pain with shoulder motion.  HX of lymphedema on her right side following breast cancer treatments.   Does have any appointment later today to start therapy.    She is right hand dominant.    Has been using meloxicam     Injury: gradual onset   **  Notes shoulder and upper arm pain with use.  Not much pain with golfing, but more pain afterward.  + pain at rest currently; notes lateral shoulder and upper arm.    Pain with lying on right side.      Location of Pain: right shoulder   Duration of Pain: 2 month(s)  Rating of Pain at worst: 8/10  Rating of Pain Currently: 6/10  Symptoms are better with: Tylenol and Rest  Symptoms are worse with: use   Additional Features:   Positive: grinding   Negative: swelling, bruising, popping, catching, locking, instability, paresthesias, numbness, weakness, pain in other joints and systemic symptoms  Other evaluation and/or treatments so far consists of: Nothing  Prior History of related problems: denies     Social History: retired     Review of Systems  Musculoskeletal: as above  Remainder of review of systems is negative including constitutional, CV, pulmonary, GI, Skin and Neurologic except as noted in HPI or medical history.    Past Medical History:   Diagnosis Date     Arthritis 02/01/2014    Per pt had it since FEB     Basal cell cancer     sees dermatologist     Breast cancer (H) 1996    invasive ductal     HTN      Osteopenia      Recurrent UTI      Past  Surgical History:   Procedure Laterality Date     BIOPSY  1996 2004,2010    basal cell cancers     BUNIONECTOMY RT/LT       [facility]OLOG[facility]      ganglion cyst removal     COLONOSCOPY       GENITOURINARY SURGERY       HC EXCISION BREAST LESION, OPEN >=1  1996    right breast     SLING TRANSVAGINAL  12/9/2013    Procedure: SLING TRANSVAGINAL;  Cysto with TVT;  Surgeon: Denia Shultz MD;  Location: [facility] OR     SURGICAL HISTORY OF -   1959    right thigh tumor removal/benign     Family History   Problem Relation Age of Onset     Heart Disease Mother         cad at age 70s     Cancer Mother         KIDNEY     Diabetes Mother      Coronary Artery Disease Mother      Hypertension Mother      Heart Disease Father         chf     Neurologic Disorder Father         PARKINSONS     Alzheimer Disease Father      Heart Disease Maternal Grandmother         chf     Coronary Artery Disease Maternal Grandmother      Cancer Maternal Grandfather         ?     Heart Disease Paternal Grandmother      Coronary Artery Disease Paternal Grandmother      Heart Disease Paternal Grandfather      Neurologic Disorder Paternal Grandfather         PARKINSONS     Blood Disease Brother         LEUKEMIA     Breast Cancer Other      Diabetes Other         Mother's sister's daughter     Cancer Daughter         CERVICAL     Cancer Other         THYROID     Cerebrovascular Disease Other      Breast Cancer Daughter      Social History     Socioeconomic History     Marital status:      Spouse name: Not on file     Number of children: Not on file     Years of education: Not on file     Highest education level: Not on file   Occupational History     Not on file   Social Needs     Financial resource strain: Not on file     Food insecurity     Worry: Not on file     Inability: Not on file     Transportation needs     Medical: Not on file     Non-medical: Not on file   Tobacco Use     Smoking status: Former Smoker     Packs/day: 1.00      "Years: 10.00     Pack years: 10.00     Types: Cigarettes     Start date: 1956     Last attempt to quit: 1985     Years since quittin.7     Smokeless tobacco: Never Used     Tobacco comment: I stopped smoking several times from  to    Substance and Sexual Activity     Alcohol use: Yes     Alcohol/week: 2.5 - 3.3 standard drinks     Comment: Red wine     Drug use: No     Sexual activity: Yes     Partners: Male     Birth control/protection: Post-menopausal, Female Surgical     Comment: Tubal in  & now I am post-menopausal   Lifestyle     Physical activity     Days per week: Not on file     Minutes per session: Not on file     Stress: Not on file   Relationships     Social connections     Talks on phone: Not on file     Gets together: Not on file     Attends Congregation service: Not on file     Active member of club or organization: Not on file     Attends meetings of clubs or organizations: Not on file     Relationship status: Not on file     Intimate partner violence     Fear of current or ex partner: Not on file     Emotionally abused: Not on file     Physically abused: Not on file     Forced sexual activity: Not on file   Other Topics Concern      Service No     Blood Transfusions No     Caffeine Concern No     Occupational Exposure No     Hobby Hazards No     Sleep Concern No     Stress Concern No     Weight Concern No     Special Diet No     Back Care No     Exercise No     Bike Helmet No     Seat Belt No     Self-Exams No     Parent/sibling w/ CABG, MI or angioplasty before 65F 55M? No   Social History Narrative     Not on file       Objective  BP (!) 142/76   Ht 1.6 m (5' 3\")   Wt 60.3 kg (133 lb)   BMI 23.56 kg/m      GENERAL APPEARANCE: healthy, alert and no distress   GAIT: NORMAL  SKIN: no suspicious lesions or rashes  NEURO: Normal strength and tone, mentation intact and speech normal  PSYCH:  mentation appears normal and affect normal/bright  HEENT: no scleral icterus  CV: " distal perfusion intact  RESP: nonlabored breathing      Right Shoulder exam    ROM:        forward flexion grossly full, painful arc        abduction lacking 5-10 deg compared to left, pain at/above shoulder level       internal rotation mild limitation       external rotation mild limitation  Pain also with ER, less with IR    Strength:        abduction 4+/5       internal rotation 5/5       external rotation 4/5  Pain with ER    Impingement testing:        positive (+) Huggins       positive (+) empty can, mild, able to resist    Skin:       no visible deformities       well perfused       capillary refill brisk    Sensation:        normal sensation over shoulder and upper extremity       Radiology  Visualized radiographs as noted below, and reviewed the images with the patient; if the report was available at the time of the visit, the report was reviewed as well.  AC arthrosis. Small calcification noted, appears chronic.    Recent Results (from the past 24 hour(s))   XR Shoulder Right G/E 3 Views    Narrative    RIGHT SHOULDER THREE OR MORE VIEWS   9/25/2020 11:01 AM     HISTORY: Acute pain of right shoulder.    COMPARISON: None.      Impression    IMPRESSION: Moderate acromioclavicular degenerative changes.  Glenohumeral joint is maintained. Small calcification inferior to the  acromioclavicular joint is likely soft tissue calcification or area of  calcific tendinitis. No evidence of fracture.    ELIZABETH HUNT MD         Assessment:  1. Acute pain of right shoulder    2. Rotator cuff syndrome of right shoulder        Plan:  Discussed the assessment with the patient.  Consistent with cuff source, more impingement. May have some component calcific tendinitis.  We discussed the following: symptom treatment, activity modification/rest, imaging, rehab and injection therapy. Following discussion, plan:    Topical Treatments: Ice, Heat or Topical Analgesics as needed  Over the counter medication: PRN  X-ray of the  shoulder reviewed. We discussed consideration of additional imaging of the affected area, but this is not required currently given assessment and plan for other intervention.  Activity Modification: reviewed  Rehab: Physical Therapy: planned.  Has visit arranged already.  Steroid injection of the right shoulder: subacromial space was performed today in clinic. Discussed risks, benefits, and alternatives to injection, including during current the viral outbreak. The patient understands and desires to proceed with injection.  Icing for the next 1-2 days may be helpful for pain. Injection may take 10-14 days to see the full effect.  Follow up: 1 month if not improving with injection therapy, sooner if needed.  Questions answered. Discussed signs and symptoms that may indicate more serious issues; the patient was instructed to seek appropriate care if noted. Kate indicates understanding of these issues and agrees with the plan.      Casey Lucia DO, CAQ          Large Joint Injection/Arthocentesis: R subacromial bursa    Date/Time: 9/25/2020 11:32 AM  Performed by: Casey Lucia DO  Authorized by: Casey Lucia DO     Indications:  Pain  Needle Size:  25 G  Guidance: landmark guided    Approach:  Anterolateral  Location:  Shoulder      Site:  R subacromial bursa  Medications:  2 mL lidocaine 1 %; 40 mg triamcinolone 40 MG/ML  Outcome:  Tolerated well, no immediate complications  Procedure discussed: discussed risks, benefits, and alternatives    Consent Given by:  Patient  Timeout: timeout called immediately prior to procedure    Prep: patient was prepped and draped in usual sterile fashion          This note consists of symbols derived from keyboarding, dictation and/or voice recognition software. As a result, there may be errors in the script that have gone undetected. Please consider this when interpreting information found in this chart.        Again, thank you for allowing me to participate  in the care of your patient.        Sincerely,        Casey Lucia, DO

## 2020-09-25 NOTE — PROGRESS NOTES
Physical Therapy Initial Evaluation  September 24, 2020     MD Instructions/Precautions/Restrictions: PT eval and treat.     Therapist Impression:   Kate Isaac presents with findings consistent with right shoulder pain, with related impairments limiting her ability to dress, sleep on the right shoulder, and lift. Skilled PT services are necessary in order to reduce impairments and improve independent function.     Subjective:   C/C: ongoing shoulder pain started in back of arm along tricep, radiating to bicep, and now present at the front of the shoulder. Achy at rest but worse with elevating the arm. Since onset, symptoms are worsening. RHD. Feels better in the morning. No hx of right shoulder pain.    DOI/onset: 9/25/20 (MD's orders)  Red Flags:none reported per patient  Location:anterior and top of shoulder, tricep, bicep stops at the elbow Quality: aching.   Frequency: intermittent pain is worse as day goes on with use. Pain scale: 4/10.   Previous Treatment: injection intra-articular (today) Effect of Previous Treatment: no relief  Worsened by: dressing (pullover head), lifting the arm initially, laying on the right shoulder when sleeping.  Alleviated by: feels best first thing in the morning   Pertinent medical/surgical history: breast cancer resulting in right UE lymphedema Imaging: x-ray. Current occupational status: retired. Current Exercise Regimen: golf, walking Patient's goals are: decrease pain, get back to regular life (golf, dressing) . Return to MD:  PRN.     Objective:  SHOULDER:    Alignment: elevated entire shoulder girdle at rest ; moderate thoracic kyphosis    Correction Secondary Test mild decrease in pain with scapular assist into UR    Shoulder:   PROM L PROM R AROM L AROM R   Flex 170 170* 170 170 pain to initiate until reach end-range   Abd 170 90* 170 170*   IR 90 20*  S1*   ER 65 30* 45 20*   All passive ROM empty end-feel limited by pain    Deferred MMT due to injection immediately  prior to PT appointment    Assessment/Plan:    The patient is a 84 year old female with chief complaint of right shoulder pain.    The patient has the following significant findings with corresponding treatment plan.  Diagnosis 1:  Right shoulder pain    Pain -  manual therapy, self management, education and home program  Decreased ROM/flexibility - manual therapy, therapeutic exercise and home program  Decreased joint mobility - manual therapy, therapeutic exercise and home program  Decreased strength - therapeutic exercise, therapeutic activities and home program  Impaired balance - neuro re-education, therapeutic activities and home program  Decreased proprioception - neuro re-education and therapeutic activities  Impaired gait - gait training and assistive devices  Impaired muscle performance - neuro re-education and home program  Decreased function - therapeutic activities and home program  Impaired posture - neuro re-education, therapeutic activities and home program  Instability -  Therapeutic Activity, Therapeutic Exercise, Neuromuscular Re-education, Splinting/Taping/Bracing/Orthotic, home program    Therapy Evaluation Codes:   1) History comprised of:   Personal factors that impact the plan of care:      Please refer to health history in EMR.    Comorbidity factors that impact the plan of care are:      Please refer to health history in EMR.     Medications impacting care: None.  2) Examination of Body Systems comprised of:   Body structures and functions that impact the plan of care:      Shoulder.   Activity limitations that impact the plan of care are:      Dressing, Sitting, Working and Sleeping.   Clinical presentation characteristics are:    Stable/Uncomplicated.  3) Presentation comprised of:   Presentation scored as Low complexity with uncomplicated characteristics..  4) Decision-Making    Low complexity using standardized patient assessment instrument and/or measureable assessment of functional  outcome.  Cumulative Therapy Evaluation is: Low complexity.    Previous and current functional limitations:  (See Goal Flow Sheet for this information)    Short term and Long term goals: (See Goal Flow Sheet for this information)     Communication ability:  Patient appears to be able to clearly communicate and understand verbal and written communication and follow directions correctly.  Treatment Explanation - The following has been discussed with the patient: RX ordered/plan of care, anticipated outcomes, and possible risks and side effects.  This patient would benefit from PT intervention to resume normal activities.   Rehab potential is good.    Frequency:  1 X week, once daily  Duration:  for 6 weeks  Discharge Plan: Achieve all LTGs, be independent in home treatment program, and reach maximal therapeutic benefit.    Please refer to the daily flowsheet for treatment today, total treatment time and time spent performing 1:1 timed codes.

## 2020-10-05 ENCOUNTER — THERAPY VISIT (OUTPATIENT)
Dept: PHYSICAL THERAPY | Facility: CLINIC | Age: 85
End: 2020-10-05
Payer: COMMERCIAL

## 2020-10-05 ENCOUNTER — MYC MEDICAL ADVICE (OUTPATIENT)
Dept: FAMILY MEDICINE | Facility: CLINIC | Age: 85
End: 2020-10-05

## 2020-10-05 DIAGNOSIS — M25.511 ACUTE PAIN OF RIGHT SHOULDER: Primary | ICD-10-CM

## 2020-10-05 DIAGNOSIS — I10 HYPERTENSION GOAL BP (BLOOD PRESSURE) < 140/90: ICD-10-CM

## 2020-10-05 PROCEDURE — 97112 NEUROMUSCULAR REEDUCATION: CPT | Mod: GP | Performed by: PHYSICAL THERAPIST

## 2020-10-05 PROCEDURE — 97110 THERAPEUTIC EXERCISES: CPT | Mod: GP | Performed by: PHYSICAL THERAPIST

## 2020-10-05 RX ORDER — AMLODIPINE BESYLATE 5 MG/1
TABLET ORAL
Qty: 90 TABLET | Refills: 0 | Status: SHIPPED | OUTPATIENT
Start: 2020-10-05 | End: 2020-12-09

## 2020-10-08 ENCOUNTER — TRANSFERRED RECORDS (OUTPATIENT)
Dept: HEALTH INFORMATION MANAGEMENT | Facility: CLINIC | Age: 85
End: 2020-10-08

## 2020-10-12 ENCOUNTER — THERAPY VISIT (OUTPATIENT)
Dept: PHYSICAL THERAPY | Facility: CLINIC | Age: 85
End: 2020-10-12
Payer: COMMERCIAL

## 2020-10-12 DIAGNOSIS — M25.511 ACUTE PAIN OF RIGHT SHOULDER: Primary | ICD-10-CM

## 2020-10-12 PROCEDURE — 97110 THERAPEUTIC EXERCISES: CPT | Mod: GP | Performed by: PHYSICAL THERAPIST

## 2020-10-12 PROCEDURE — 97112 NEUROMUSCULAR REEDUCATION: CPT | Mod: GP | Performed by: PHYSICAL THERAPIST

## 2020-10-19 ENCOUNTER — THERAPY VISIT (OUTPATIENT)
Dept: PHYSICAL THERAPY | Facility: CLINIC | Age: 85
End: 2020-10-19
Payer: COMMERCIAL

## 2020-10-19 DIAGNOSIS — M25.511 ACUTE PAIN OF RIGHT SHOULDER: ICD-10-CM

## 2020-10-19 PROCEDURE — 97112 NEUROMUSCULAR REEDUCATION: CPT | Mod: GP | Performed by: PHYSICAL THERAPIST

## 2020-10-19 PROCEDURE — 97110 THERAPEUTIC EXERCISES: CPT | Mod: GP | Performed by: PHYSICAL THERAPIST

## 2020-10-20 ENCOUNTER — MYC MEDICAL ADVICE (OUTPATIENT)
Dept: FAMILY MEDICINE | Facility: CLINIC | Age: 85
End: 2020-10-20

## 2020-10-29 ENCOUNTER — THERAPY VISIT (OUTPATIENT)
Dept: PHYSICAL THERAPY | Facility: CLINIC | Age: 85
End: 2020-10-29
Payer: COMMERCIAL

## 2020-10-29 DIAGNOSIS — M25.511 ACUTE PAIN OF RIGHT SHOULDER: ICD-10-CM

## 2020-10-29 PROCEDURE — 97112 NEUROMUSCULAR REEDUCATION: CPT | Mod: GP | Performed by: PHYSICAL THERAPIST

## 2020-10-29 PROCEDURE — 97110 THERAPEUTIC EXERCISES: CPT | Mod: GP | Performed by: PHYSICAL THERAPIST

## 2020-11-03 ENCOUNTER — THERAPY VISIT (OUTPATIENT)
Dept: PHYSICAL THERAPY | Facility: CLINIC | Age: 85
End: 2020-11-03
Payer: COMMERCIAL

## 2020-11-03 DIAGNOSIS — M25.511 ACUTE PAIN OF RIGHT SHOULDER: ICD-10-CM

## 2020-11-03 PROCEDURE — 97112 NEUROMUSCULAR REEDUCATION: CPT | Mod: GP | Performed by: PHYSICAL THERAPIST

## 2020-11-03 PROCEDURE — 97110 THERAPEUTIC EXERCISES: CPT | Mod: GP | Performed by: PHYSICAL THERAPIST

## 2020-11-03 NOTE — PROGRESS NOTES
"Subjective:  HPI  Physical Exam                    Objective:  System    Physical Exam    General     ROS    Assessment/Plan:    DISCHARGE REPORT    Progress reporting period is from 9/25/2020 to 11/03/2020.       SUBJECTIVE  Patient reports mild improvement in her right shoulder since the start of therapy.  She notices less pain when reaching overhead to perform ADLs, but continues to experience some difficulty reaching across her body.  Remains cautious \"not to over-do it.\"    Current pain level is 2/10  .     Previous pain level was  4/10  .   Changes in function:  Yes (See Goal flowsheet attached for changes in current functional level)  Adverse reaction to treatment or activity: experienced some increased pain with golfing a couple weeks ago.      OBJECTIVE    * denotes pain  RIGHT SHOULDER AROM PROM STRENGTH   Flexion 135 WNL 5-/5*   Abduction 145 WNL 5-/5*   ER 72* 76* 4-/5**   IR T8 WNL 5/5     Special Tests:  Huggins-Chava positive, ER Lag sign negative        ASSESSMENT/PLAN  STG/LTGs have been met or progress has been made towards goals:  Yes (See Goal flow sheet completed today.)  Assessment of Progress: The patient's condition is improving. Patient is meeting STG and progressing toward LTG  Self Management Plans:  Patient has been instructed in a home exercise program.  Kate continues to require the following intervention to meet STG and LTG's:  Home exercise program    Recommendations:  Patient would like to focus independently on her shoulder exercises for now, so she can prepare for upcoming knee joint replacement surgery scheduled for 11/24/2020. Will consequently discharge from physical therapy.  Encouraged patient to call if she has any questions or concerns.      Please refer to the daily flowsheet for treatment today, total treatment time and time spent performing 1:1 timed codes.          "

## 2020-11-04 NOTE — PATIENT INSTRUCTIONS

## 2020-11-04 NOTE — PROGRESS NOTES
[facility]  [address] [facility] [address]  Phone: [phone]  Primary Provider: Lani Valencia  Pre-op Performing Provider: LANI VALENCIA    PREOPERATIVE EVALUATION:  Today's date: 11/11/2020    Kate Isaac is a 84 year old female who presents for a preoperative evaluation.    Surgical Information:  Surgery/Procedure: total left knee replacement   Surgery Location: [facility]  Surgeon: Malik Escoto  Surgery Date: 11/24/20   Time of Surgery: TBD   Where patient plans to recover: At home with family  Fax number for surgical facility: [facility]  [phone]    Type of Anesthesia Anticipated: General    Subjective     HPI related to upcoming procedure: pt to undergo left knee replacement due to djd      Preop Questions 11/11/2020   1. Have you ever had a heart attack or stroke? No   2. Have you ever had surgery on your heart or blood vessels, such as a stent placement, a coronary artery bypass, or surgery on an artery in your head, neck, heart, or legs? No   3. Do you have chest pain with activity? No   4. Do you have a history of  heart failure? No   5. Do you currently have a cold, bronchitis or symptoms of other infection? No   6. Do you have a cough, shortness of breath, or wheezing? No   7. Do you or anyone in your family have previous history of blood clots? No   8. Do you or does anyone in your family have a serious bleeding problem such as prolonged bleeding following surgeries or cuts? No   9. Have you ever had problems with anemia or been told to take iron pills? No   10. Have you had any abnormal blood loss such as black, tarry or bloody stools, or abnormal vaginal bleeding? No   11. Have you ever had a blood transfusion? No   12. Are you willing to have a blood transfusion if it is medically needed before, during, or after your surgery? Yes   13. Have you or any of your relatives ever had problems with anesthesia? No   14.  Do you have sleep apnea, excessive snoring or daytime drowsiness? No   15. Do you have any artifical heart valves or other implanted medical devices like a pacemaker, defibrillator, or continuous glucose monitor? No   16. Do you have artificial joints? No   17. Are you allergic to latex? No     Health Care Directive:  Patient has a Health Care Directive on file      Preoperative Review of :   reviewed - no record of controlled substances prescribed.      Status of Chronic Conditions:  HYPERTENSION - Patient has longstanding history of HTN , currently denies any symptoms referable to elevated blood pressure. Specifically denies chest pain, palpitations, dyspnea, orthopnea, PND or peripheral edema. Blood pressure readings have been in normal range. Current medication regimen is as listed below. Patient denies any side effects of medication.       Review of Systems  Constitutional, neuro, ENT, endocrine, pulmonary, cardiac, gastrointestinal, genitourinary, musculoskeletal, integument and psychiatric systems are negative, except as otherwise noted.    Patient Active Problem List    Diagnosis Date Noted     Essential hypertension with goal blood pressure less than 140/90 05/17/2016     Priority: Medium     Female stress incontinence 07/22/2013     Priority: Medium     Urge incontinence 07/30/2012     Priority: Medium     Advance Care Planning 07/06/2011     Priority: Medium     Advance Care Planning: Receipt of ACP document:  Received: Health Care Directive which was witnessed or notarized on 9-10-93.  Document previously scanned on 12-9-13.  Validation form completed and sent to be scanned.  Code Status needs to be updated to reflect choices in most recent ACP document..  Confirmed/documented designated decision maker(s). See permanent comments section of demographics in clinical tab. View document(s) and details by clicking on code status. Added by Yesenia Oro RN, System ACP Coordinator on 3/28/2014.              Seasonal allergies 07/06/2011     Priority: Medium     CARDIOVASCULAR SCREENING; LDL GOAL LESS THAN 130 07/06/2011     Priority: Medium     Osteopenia      Priority: Medium     Recurrent UTI      Priority: Medium     Breast cancer (H)      Priority: Medium     invasive ductal       Malignant basal cell neoplasm of skin      Priority: Medium     sees dermatologist  Do you wish to do the replacement in the background? yes          Past Medical History:   Diagnosis Date     Arthritis 02/01/2014    Per pt had it since FEB     Basal cell cancer     sees dermatologist     Breast cancer (H) 1996    invasive ductal     HTN      Osteopenia      Recurrent UTI      Past Surgical History:   Procedure Laterality Date     BIOPSY  1996 2004,2010    basal cell cancers     BUNIONECTOMY RT/LT       CL AFF SURGICAL PATHOLOGY      ganglion cyst removal     COLONOSCOPY       GENITOURINARY SURGERY       HC EXCISION BREAST LESION, OPEN >=1  1996    right breast     SLING TRANSVAGINAL  12/9/2013    Procedure: SLING TRANSVAGINAL;  Cysto with TVT;  Surgeon: Denia Shultz MD;  Location: [facility] OR     SURGICAL HISTORY OF -   1959    right thigh tumor removal/benign     Current Outpatient Medications   Medication Sig Dispense Refill     amLODIPine (NORVASC) 5 MG tablet TAKE 1 TABLET EVERY DAY 90 tablet 0     calcium carb 1250 mg, 500 mg [place],/vitamin D 200 units (OSCAL WITH D) 500-200 MG-UNIT per tablet Take 1 tablet by mouth 2 times daily (with meals)       FIBER PO Take 2 capsules by mouth At Bedtime        Flaxseed, Linseed, (FLAX PO)        losartan (COZAAR) 50 MG tablet TAKE 1 TABLET EVERY DAY 90 tablet 1     meloxicam (MOBIC) 15 MG tablet TAKE 1 TABLET EVERY DAY 90 tablet 1     Multiple Vitamins-Minerals (PRESERVISION AREDS 2) CAPS One in AM and one in PM       GLUCOSAMINE CHONDROITIN OR TABS 1 twice daily         Allergies   Allergen Reactions     Ceftriaxone Swelling     Sulfa Drugs Rash     Lisinopril Fatigue        Social History  "    Tobacco Use     Smoking status: Former Smoker     Packs/day: 1.00     Years: 10.00     Pack years: 10.00     Types: Cigarettes     Start date: 1956     Quit date: 1985     Years since quittin.8     Smokeless tobacco: Never Used     Tobacco comment: I stopped smoking several times from  to    Substance Use Topics     Alcohol use: Yes     Alcohol/week: 2.5 - 3.3 standard drinks     Comment: Red wine     Family History   Problem Relation Age of Onset     Heart Disease Mother         cad at age 70s     Cancer Mother         KIDNEY     Diabetes Mother      Coronary Artery Disease Mother      Hypertension Mother      Heart Disease Father         chf     Neurologic Disorder Father         PARKINSONS     Alzheimer Disease Father      Heart Disease Maternal Grandmother         chf     Coronary Artery Disease Maternal Grandmother      Cancer Maternal Grandfather         ?     Heart Disease Paternal Grandmother      Coronary Artery Disease Paternal Grandmother      Heart Disease Paternal Grandfather      Neurologic Disorder Paternal Grandfather         PARKINSONS     Blood Disease Brother         LEUKEMIA     Breast Cancer Other      Diabetes Other         Mother's sister's daughter     Cancer Daughter         CERVICAL     Cancer Other         THYROID     Cerebrovascular Disease Other      Breast Cancer Daughter      History   Drug Use No         Objective     /77   Pulse 89   Temp 97.9  F (36.6  C) (Oral)   Ht 1.613 m (5' 3.5\")   Wt 59.4 kg (131 lb)   BMI 22.84 kg/m      Physical Exam    GENERAL APPEARANCE: healthy, alert and no distress     EYES: EOMI, PERRL     HENT: ear canals and TM's normal and nose and mouth without ulcers or lesions     NECK: no adenopathy, no asymmetry, masses, or scars and thyroid normal to palpation     RESP: lungs clear to auscultation - no rales, rhonchi or wheezes     CV: regular rates and rhythm, normal S1 S2, no S3 or S4 and no murmur, click or rub     " ABDOMEN:  soft, nontender, no HSM or masses and bowel sounds normal     MS: extremities normal- no gross deformities noted, no evidence of inflammation in joints, FROM in all extremities.     SKIN: no suspicious lesions or rashes     NEURO: Normal strength and tone, sensory exam grossly normal, mentation intact and speech normal     PSYCH: mentation appears normal. and affect normal/bright     LYMPHATICS: No cervical adenopathy    Recent Labs   Lab Test 09/16/20  0814 07/24/20  0804 02/22/19  1259 02/22/19  1259   HGB 13.3  --   --  12.6     --   --  297    138   < > 136   POTASSIUM 4.4 4.2   < > 4.0   CR 0.52 0.53   < > 0.49*    < > = values in this interval not displayed.        Diagnostics:  Labs pending at this time.  Results will be reviewed when available. BMP  EKG: appears normal, NSR, normal axis, normal intervals, no acute ST/T changes c/w ischemia, no LVH by voltage criteria, unchanged from previous tracings    Revised Cardiac Risk Index (RCRI):  The patient has the following serious cardiovascular risks for perioperative complications:   - No serious cardiac risks = 0 points     RCRI Interpretation: 0 points: Class I (very low risk - 0.4% complication rate)           Assessment & Plan   The proposed surgical procedure is considered INTERMEDIATE risk.    Preop general physical exam    - EKG 12-lead complete w/read - Clinics  - Basic metabolic panel    Primary osteoarthritis of left knee      Essential hypertension with goal blood pressure less than 140/90           Risks and Recommendations:  The patient has the following additional risks and recommendations for perioperative complications:   - No identified additional risk factors other than previously addressed    Medication Instructions:   - ACE/ARB: May be continued on the day of surgery.    - Calcium Channel Blockers: May be continued on the day of surgery.   - meloxicam (Mobic): HOLD 10 days before surgery.     RECOMMENDATION:  APPROVAL  GIVEN to proceed with proposed procedure pending review of diagnostic evaluation.    Signed Electronically by: Lani Valencia MD    Copy of this evaluation report is provided to requesting physician.    [facility] Pre[facility]    Revised Cardiac Risk Index

## 2020-11-11 ENCOUNTER — OFFICE VISIT (OUTPATIENT)
Dept: FAMILY MEDICINE | Facility: CLINIC | Age: 85
End: 2020-11-11
Payer: COMMERCIAL

## 2020-11-11 VITALS
HEIGHT: 64 IN | BODY MASS INDEX: 22.36 KG/M2 | DIASTOLIC BLOOD PRESSURE: 77 MMHG | WEIGHT: 131 LBS | TEMPERATURE: 97.9 F | SYSTOLIC BLOOD PRESSURE: 134 MMHG | HEART RATE: 89 BPM

## 2020-11-11 DIAGNOSIS — Z01.818 PREOP GENERAL PHYSICAL EXAM: Primary | ICD-10-CM

## 2020-11-11 DIAGNOSIS — M17.12 PRIMARY OSTEOARTHRITIS OF LEFT KNEE: ICD-10-CM

## 2020-11-11 DIAGNOSIS — I10 ESSENTIAL HYPERTENSION WITH GOAL BLOOD PRESSURE LESS THAN 140/90: ICD-10-CM

## 2020-11-11 LAB
ANION GAP SERPL CALCULATED.3IONS-SCNC: 3 MMOL/L (ref 3–14)
BUN SERPL-MCNC: 16 MG/DL (ref 7–30)
CALCIUM SERPL-MCNC: 8.9 MG/DL (ref 8.5–10.1)
CHLORIDE SERPL-SCNC: 103 MMOL/L (ref 94–109)
CO2 SERPL-SCNC: 30 MMOL/L (ref 20–32)
CREAT SERPL-MCNC: 0.52 MG/DL (ref 0.52–1.04)
GFR SERPL CREATININE-BSD FRML MDRD: 88 ML/MIN/{1.73_M2}
GLUCOSE SERPL-MCNC: 95 MG/DL (ref 70–99)
POTASSIUM SERPL-SCNC: 4.1 MMOL/L (ref 3.4–5.3)
SODIUM SERPL-SCNC: 136 MMOL/L (ref 133–144)

## 2020-11-11 PROCEDURE — 80048 BASIC METABOLIC PNL TOTAL CA: CPT | Performed by: FAMILY MEDICINE

## 2020-11-11 PROCEDURE — 36415 COLL VENOUS BLD VENIPUNCTURE: CPT | Performed by: FAMILY MEDICINE

## 2020-11-11 PROCEDURE — 99214 OFFICE O/P EST MOD 30 MIN: CPT | Performed by: FAMILY MEDICINE

## 2020-11-11 PROCEDURE — 93000 ELECTROCARDIOGRAM COMPLETE: CPT | Performed by: FAMILY MEDICINE

## 2020-11-11 ASSESSMENT — MIFFLIN-ST. JEOR: SCORE: 1026.27

## 2020-12-07 ENCOUNTER — TRANSFERRED RECORDS (OUTPATIENT)
Dept: HEALTH INFORMATION MANAGEMENT | Facility: CLINIC | Age: 85
End: 2020-12-07

## 2020-12-08 DIAGNOSIS — I10 HYPERTENSION GOAL BP (BLOOD PRESSURE) < 140/90: ICD-10-CM

## 2020-12-09 RX ORDER — LOSARTAN POTASSIUM 50 MG/1
TABLET ORAL
Qty: 90 TABLET | Refills: 1 | Status: SHIPPED | OUTPATIENT
Start: 2020-12-09 | End: 2021-06-01

## 2020-12-09 RX ORDER — AMLODIPINE BESYLATE 5 MG/1
TABLET ORAL
Qty: 90 TABLET | Refills: 1 | Status: SHIPPED | OUTPATIENT
Start: 2020-12-09 | End: 2021-06-01

## 2021-01-06 ENCOUNTER — TELEPHONE (OUTPATIENT)
Dept: FAMILY MEDICINE | Facility: CLINIC | Age: 86
End: 2021-01-06

## 2021-01-06 NOTE — TELEPHONE ENCOUNTER
Patient has an ultrasound with [facility] next week to check for blood clots before a knee replacement. Per patient, new insurance is out of network at . Would like PCP to enter an order for this to be done with [place], and assistance scheduling this ASAP. Tatyana DOCKERY -

## 2021-01-06 NOTE — TELEPHONE ENCOUNTER
Called patient.     She said she is unsure specifically what area her surgeon wants ultrasound of (ie leg, calf, left, right, etc).   She is having no symptoms right now and thinks it's only being ordered as part of the pre-op.    She has left a message for her surgeon's office to see if he can order the US or not.   I gave patient our fax number for Dr Valencia so if surgeon cannot order and needs us to, he can fax over the requested information.    Massiel Tuttle BSN, RN

## 2021-01-06 NOTE — TELEPHONE ENCOUNTER
I'm assuming she wants an ultrasound of her leg. Which leg and does she have any symptoms like swelling?    Lani Valencia MD

## 2021-01-07 NOTE — PATIENT INSTRUCTIONS

## 2021-01-07 NOTE — PROGRESS NOTES
[facility]  [address] [facility] [address]  Phone: [phone]  Primary Provider: Lani Valencia  Pre-op Performing Provider: LANI VALENCIA    PREOPERATIVE EVALUATION:  Today's date: 1/11/2021    Kate Isaac is a 85 year old female who presents for a preoperative evaluation.    Surgical Information:  Surgery/Procedure:right total knee replacement   Surgery Location: [facility]  Surgeon: Sarina Escoto  Surgery Date: 1/19/21  Time of Surgery: TBD  Where patient plans to recover: At home with family  Fax number for surgical facility: [phone]    Type of Anesthesia Anticipated: General    Subjective     HPI related to upcoming procedure: Pt to undergo right knee replacement      Preop Questions 1/9/2021   1. Have you ever had a heart attack or stroke? No   2. Have you ever had surgery on your heart or blood vessels, such as a stent placement, a coronary artery bypass, or surgery on an artery in your head, neck, heart, or legs? No   3. Do you have chest pain with activity? No   4. Do you have a history of  heart failure? No   5. Do you currently have a cold, bronchitis or symptoms of other infection? No   6. Do you have a cough, shortness of breath, or wheezing? No   7. Do you or anyone in your family have previous history of blood clots? No   8. Do you or does anyone in your family have a serious bleeding problem such as prolonged bleeding following surgeries or cuts? No   9. Have you ever had problems with anemia or been told to take iron pills? No   10. Have you had any abnormal blood loss such as black, tarry or bloody stools, or abnormal vaginal bleeding? No   11. Have you ever had a blood transfusion? No   12. Are you willing to have a blood transfusion if it is medically needed before, during, or after your surgery? Yes   13. Have you or any of your relatives ever had problems with anesthesia? No   14. Do you have sleep apnea, excessive  snoring or daytime drowsiness? No   15. Do you have any artifical heart valves or other implanted medical devices like a pacemaker, defibrillator, or continuous glucose monitor? No   16. Do you have artificial joints? YES - left knee replacement   17. Are you allergic to latex? No     Health Care Directive:  Patient has a Health Care Directive on file      Preoperative Review of :   reviewed - controlled substances prescribed by other outside provider(s).      Status of Chronic Conditions:  HYPERTENSION - Patient has longstanding history of HTN , currently denies any symptoms referable to elevated blood pressure. Specifically denies chest pain, palpitations, dyspnea, orthopnea, PND or peripheral edema. Blood pressure readings have been in normal range. Current medication regimen is as listed below. Patient denies any side effects of medication.       Review of Systems  Constitutional, neuro, ENT, endocrine, pulmonary, cardiac, gastrointestinal, genitourinary, musculoskeletal, integument and psychiatric systems are negative, except as otherwise noted.    Patient Active Problem List    Diagnosis Date Noted     Essential hypertension with goal blood pressure less than 140/90 05/17/2016     Priority: Medium     Female stress incontinence 07/22/2013     Priority: Medium     Urge incontinence 07/30/2012     Priority: Medium     Advance Care Planning 07/06/2011     Priority: Medium     Advance Care Planning: Receipt of ACP document:  Received: Health Care Directive which was witnessed or notarized on 9-10-93.  Document previously scanned on 12-9-13.  Validation form completed and sent to be scanned.  Code Status needs to be updated to reflect choices in most recent ACP document..  Confirmed/documented designated decision maker(s). See permanent comments section of demographics in clinical tab. View document(s) and details by clicking on code status. Added by Yesenia Oro RN, System ACP Coordinator on  3/28/2014.             Seasonal allergies 07/06/2011     Priority: Medium     CARDIOVASCULAR SCREENING; LDL GOAL LESS THAN 130 07/06/2011     Priority: Medium     Osteopenia      Priority: Medium     Recurrent UTI      Priority: Medium     Breast cancer (H)      Priority: Medium     invasive ductal       Malignant basal cell neoplasm of skin      Priority: Medium     sees dermatologist  Do you wish to do the replacement in the background? yes          Past Medical History:   Diagnosis Date     Arthritis 02/01/2014    Per pt had it since FEB     Basal cell cancer     sees dermatologist     Breast cancer (H) 1996    invasive ductal     HTN      Osteopenia      Recurrent UTI      Past Surgical History:   Procedure Laterality Date     BIOPSY  1996 2004,2010    basal cell cancers     BUNIONECTOMY RT/LT       CL AFF SURGICAL PATHOLOGY      ganglion cyst removal     COLONOSCOPY       GENITOURINARY SURGERY       HC EXCISION BREAST LESION, OPEN >=1  1996    right breast     SLING TRANSVAGINAL  12/9/2013    Procedure: SLING TRANSVAGINAL;  Cysto with TVT;  Surgeon: Denia Shultz MD;  Location: [facility] OR     SURGICAL HISTORY OF -   1959    right thigh tumor removal/benign     Current Outpatient Medications   Medication Sig Dispense Refill     amLODIPine (NORVASC) 5 MG tablet TAKE 1 TABLET EVERY DAY 90 tablet 1     calcium carb 1250 mg, 500 mg [place],/vitamin D 200 units (OSCAL WITH D) 500-200 MG-UNIT per tablet Take 1 tablet by mouth 2 times daily (with meals)       FIBER PO Take 2 capsules by mouth At Bedtime        Flaxseed, Linseed, (FLAX PO)        GLUCOSAMINE CHONDROITIN OR TABS 1 twice daily       losartan (COZAAR) 50 MG tablet TAKE 1 TABLET EVERY DAY 90 tablet 1     Multiple Vitamins-Minerals (PRESERVISION AREDS 2) CAPS One in AM and one in PM         Allergies   Allergen Reactions     Ceftriaxone Swelling     Sulfa Drugs Rash     Lisinopril Fatigue        Social History     Tobacco Use     Smoking status: Former Smoker  "    Packs/day: 1.00     Years: 10.00     Pack years: 10.00     Types: Cigarettes     Start date: 1956     Quit date: 1985     Years since quittin.0     Smokeless tobacco: Never Used     Tobacco comment: I stopped smoking several times from  to    Substance Use Topics     Alcohol use: Yes     Alcohol/week: 2.5 - 3.3 standard drinks     Comment: Red wine     Family History   Problem Relation Age of Onset     Heart Disease Mother         cad at age 70s     Cancer Mother         KIDNEY     Diabetes Mother      Coronary Artery Disease Mother      Hypertension Mother      Heart Disease Father         chf     Neurologic Disorder Father         PARKINSONS     Alzheimer Disease Father      Heart Disease Maternal Grandmother         chf     Coronary Artery Disease Maternal Grandmother      Cancer Maternal Grandfather         ?     Heart Disease Paternal Grandmother      Coronary Artery Disease Paternal Grandmother      Heart Disease Paternal Grandfather      Neurologic Disorder Paternal Grandfather         PARKINSONS     Blood Disease Brother         LEUKEMIA     Breast Cancer Other      Diabetes Other         Mother's sister's daughter     Cancer Daughter         CERVICAL     Cancer Other         THYROID     Cerebrovascular Disease Other      Breast Cancer Daughter      History   Drug Use No         Objective     /83   Pulse 90   Temp 98.1  F (36.7  C) (Oral)   Ht 1.613 m (5' 3.5\")   Wt 59.4 kg (131 lb)   BMI 22.84 kg/m      Physical Exam    GENERAL APPEARANCE: healthy, alert and no distress     EYES: EOMI, PERRL     HENT: ear canals and TM's normal and nose and mouth without ulcers or lesions     NECK: no adenopathy, no asymmetry, masses, or scars and thyroid normal to palpation     RESP: lungs clear to auscultation - no rales, rhonchi or wheezes     CV: regular rates and rhythm, normal S1 S2, no S3 or S4 and no murmur, click or rub     ABDOMEN:  soft, nontender, no HSM or masses and bowel " sounds normal     MS: extremities normal- no gross deformities noted, no evidence of inflammation in joints, FROM in all extremities.     SKIN: no suspicious lesions or rashes     NEURO: Normal strength and tone, sensory exam grossly normal, mentation intact and speech normal     PSYCH: mentation appears normal. and affect normal/bright     LYMPHATICS: No cervical adenopathy    Recent Labs   Lab Test 11/11/20  0950 09/16/20  0814 02/22/19  1259 02/22/19  1259   HGB  --  13.3  --  12.6   PLT  --  311  --  297    138   < > 136   POTASSIUM 4.1 4.4   < > 4.0   CR 0.52 0.52   < > 0.49*    < > = values in this interval not displayed.        Diagnostics:  Labs pending at this time.  Results will be reviewed when available.   EKG: appears normal, NSR, normal axis, normal intervals, no acute ST/T changes c/w ischemia, no LVH by voltage criteria, unchanged from previous tracings    Revised Cardiac Risk Index (RCRI):  The patient has the following serious cardiovascular risks for perioperative complications:   - No serious cardiac risks = 0 points     RCRI Interpretation: 0 points: Class I (very low risk - 0.4% complication rate)           Assessment & Plan   The proposed surgical procedure is considered INTERMEDIATE risk.    Preop general physical exam    - Basic metabolic panel    Osteoarthritis of right knee, unspecified osteoarthritis type  Replacement per surgery        Risks and Recommendations:  The patient has the following additional risks and recommendations for perioperative complications:   - No identified additional risk factors other than previously addressed    Medication Instructions:  Patient is to take all scheduled medications on the day of surgery EXCEPT for modifications listed below:   - ACE/ARB: May be continued on the day of surgery.    - Calcium Channel Blockers: May be continued on the day of surgery.    RECOMMENDATION:  APPROVAL GIVEN to proceed with proposed procedure pending review of  diagnostic evaluation.    Signed Electronically by: Lani Valencia MD    Copy of this evaluation report is provided to requesting physician.    Preop [facility] Preop Guidelines    Revised Cardiac Risk Index

## 2021-01-11 ENCOUNTER — OFFICE VISIT (OUTPATIENT)
Dept: FAMILY MEDICINE | Facility: CLINIC | Age: 86
End: 2021-01-11
Payer: COMMERCIAL

## 2021-01-11 VITALS
BODY MASS INDEX: 22.36 KG/M2 | HEIGHT: 64 IN | TEMPERATURE: 98.1 F | DIASTOLIC BLOOD PRESSURE: 83 MMHG | SYSTOLIC BLOOD PRESSURE: 139 MMHG | HEART RATE: 90 BPM | WEIGHT: 131 LBS

## 2021-01-11 DIAGNOSIS — M17.11 OSTEOARTHRITIS OF RIGHT KNEE, UNSPECIFIED OSTEOARTHRITIS TYPE: ICD-10-CM

## 2021-01-11 DIAGNOSIS — Z01.818 PREOP GENERAL PHYSICAL EXAM: Primary | ICD-10-CM

## 2021-01-11 LAB
ANION GAP SERPL CALCULATED.3IONS-SCNC: 3 MMOL/L (ref 3–14)
BUN SERPL-MCNC: 14 MG/DL (ref 7–30)
CALCIUM SERPL-MCNC: 9.2 MG/DL (ref 8.5–10.1)
CHLORIDE SERPL-SCNC: 103 MMOL/L (ref 94–109)
CO2 SERPL-SCNC: 30 MMOL/L (ref 20–32)
CREAT SERPL-MCNC: 0.48 MG/DL (ref 0.52–1.04)
GFR SERPL CREATININE-BSD FRML MDRD: >90 ML/MIN/{1.73_M2}
GLUCOSE SERPL-MCNC: 92 MG/DL (ref 70–99)
POTASSIUM SERPL-SCNC: 4.5 MMOL/L (ref 3.4–5.3)
SODIUM SERPL-SCNC: 136 MMOL/L (ref 133–144)

## 2021-01-11 PROCEDURE — 80048 BASIC METABOLIC PNL TOTAL CA: CPT | Performed by: FAMILY MEDICINE

## 2021-01-11 PROCEDURE — 36415 COLL VENOUS BLD VENIPUNCTURE: CPT | Performed by: FAMILY MEDICINE

## 2021-01-11 PROCEDURE — 99214 OFFICE O/P EST MOD 30 MIN: CPT | Performed by: FAMILY MEDICINE

## 2021-01-11 ASSESSMENT — MIFFLIN-ST. JEOR: SCORE: 1026.27

## 2021-01-11 NOTE — LETTER
January 11, 2021      Kate Isaac  [address] [place] [address]              Dear ,    We are writing to inform you of your test results.        If you have any questions or concerns, please call the clinic at the number listed above.       Sincerely,      Lani Valencia MD/[facility]    Resulted Orders   Basic metabolic panel   Result Value Ref Range    Sodium 136 133 - 144 mmol/L    Potassium 4.5 3.4 - 5.3 mmol/L    Chloride 103 94 - 109 mmol/L    Carbon Dioxide 30 20 - 32 mmol/L    Anion Gap 3 3 - 14 mmol/L    Glucose 92 70 - 99 mg/dL      Comment:      Non Fasting    Urea Nitrogen 14 7 - 30 mg/dL    Creatinine 0.48 (L) 0.52 - 1.04 mg/dL    GFR Estimate >90 >60 mL/min/[1.73_m2]      Comment:      Non  GFR Calc  Starting 12/18/2018, serum creatinine based estimated GFR (eGFR) will be   calculated using the Chronic Kidney Disease Epidemiology Collaboration   (CKD-EPI) equation.      GFR Estimate If Black >90 >60 mL/min/[1.73_m2]      Comment:       GFR Calc  Starting 12/18/2018, serum creatinine based estimated GFR (eGFR) will be   calculated using the Chronic Kidney Disease Epidemiology Collaboration   (CKD-EPI) equation.      Calcium 9.2 8.5 - 10.1 mg/dL

## 2021-01-15 ENCOUNTER — HEALTH MAINTENANCE LETTER (OUTPATIENT)
Age: 86
End: 2021-01-15

## 2021-01-25 ENCOUNTER — DOCUMENTATION ONLY (OUTPATIENT)
Dept: LAB | Facility: CLINIC | Age: 86
End: 2021-01-25

## 2021-01-25 DIAGNOSIS — D50.9 IRON DEFICIENCY ANEMIA, UNSPECIFIED IRON DEFICIENCY ANEMIA TYPE: Primary | ICD-10-CM

## 2021-01-26 ENCOUNTER — MYC MEDICAL ADVICE (OUTPATIENT)
Dept: FAMILY MEDICINE | Facility: CLINIC | Age: 86
End: 2021-01-26

## 2021-01-26 DIAGNOSIS — M25.561 RIGHT KNEE PAIN: ICD-10-CM

## 2021-01-26 DIAGNOSIS — D50.9 IRON DEFICIENCY ANEMIA, UNSPECIFIED IRON DEFICIENCY ANEMIA TYPE: ICD-10-CM

## 2021-01-26 LAB
BASOPHILS # BLD AUTO: 0.1 10E9/L (ref 0–0.2)
BASOPHILS NFR BLD AUTO: 0.9 %
DIFFERENTIAL METHOD BLD: ABNORMAL
EOSINOPHIL # BLD AUTO: 0.3 10E9/L (ref 0–0.7)
EOSINOPHIL NFR BLD AUTO: 3.2 %
ERYTHROCYTE [DISTWIDTH] IN BLOOD BY AUTOMATED COUNT: 13.4 % (ref 10–15)
HCT VFR BLD AUTO: 29.6 % (ref 35–47)
HGB BLD-MCNC: 9.2 G/DL (ref 11.7–15.7)
IMM GRANULOCYTES # BLD: 0.1 10E9/L (ref 0–0.4)
IMM GRANULOCYTES NFR BLD: 0.9 %
LYMPHOCYTES # BLD AUTO: 1.1 10E9/L (ref 0.8–5.3)
LYMPHOCYTES NFR BLD AUTO: 12.4 %
MCH RBC QN AUTO: 28.5 PG (ref 26.5–33)
MCHC RBC AUTO-ENTMCNC: 31.1 G/DL (ref 31.5–36.5)
MCV RBC AUTO: 92 FL (ref 78–100)
MONOCYTES # BLD AUTO: 1.2 10E9/L (ref 0–1.3)
MONOCYTES NFR BLD AUTO: 14 %
NEUTROPHILS # BLD AUTO: 6 10E9/L (ref 1.6–8.3)
NEUTROPHILS NFR BLD AUTO: 68.6 %
PLATELET # BLD AUTO: 427 10E9/L (ref 150–450)
RBC # BLD AUTO: 3.23 10E12/L (ref 3.8–5.2)
WBC # BLD AUTO: 8.8 10E9/L (ref 4–11)

## 2021-01-26 PROCEDURE — 36415 COLL VENOUS BLD VENIPUNCTURE: CPT | Performed by: FAMILY MEDICINE

## 2021-01-26 PROCEDURE — 85025 COMPLETE CBC W/AUTO DIFF WBC: CPT | Performed by: FAMILY MEDICINE

## 2021-01-28 DIAGNOSIS — M25.561 RIGHT KNEE PAIN: ICD-10-CM

## 2021-01-28 DIAGNOSIS — Z00.00 ROUTINE GENERAL MEDICAL EXAMINATION AT A HEALTH CARE FACILITY: Primary | ICD-10-CM

## 2021-02-01 ENCOUNTER — VIRTUAL VISIT (OUTPATIENT)
Dept: FAMILY MEDICINE | Facility: CLINIC | Age: 86
End: 2021-02-01
Payer: COMMERCIAL

## 2021-02-01 DIAGNOSIS — I10 ESSENTIAL HYPERTENSION WITH GOAL BLOOD PRESSURE LESS THAN 140/90: Primary | ICD-10-CM

## 2021-02-01 DIAGNOSIS — D64.9 ANEMIA, UNSPECIFIED TYPE: ICD-10-CM

## 2021-02-01 PROCEDURE — 99213 OFFICE O/P EST LOW 20 MIN: CPT | Mod: 95 | Performed by: FAMILY MEDICINE

## 2021-02-01 NOTE — PROGRESS NOTES
Kate is a 85 year old who is being evaluated via a billable video visit.      How would you like to obtain your AVS? MyChart  If the video visit is dropped, the invitation should be resent by: Text to cell phone: [phone]  Will anyone else be joining your video visit? No    Video Start Time: 11:13 AM  Assessment & Plan     Essential hypertension with goal blood pressure less than 140/90  At goal on meds, continue current meds, labs UTD    Anemia, unspecified type  Improving post surgery. Continue to follow til normal                               No follow-ups on file.    Lani Valencia MD  [facility] [place]    Subjective     Kate is a 85 year old who presents to clinic today for the following health issues     HPI       Follow up on labs    Was told to take half of the amlodipine while in hospital, has not done that. Wondering if she should      Pt with htn on losartan 50 mg and amlodipine  Pt with recent hospital stay for right knee replacement  She was told to take 1/2 her dose of amlodipine.   She has been taking her full dose and her BP is fine  Will have her continue on amlodipine 5 mg    Pt with anemia after her knee replacement.  Last check it was improving  She is trying to eat high iron foods  Will have her recheck it in one month.     .    Review of Systems   Constitutional, HEENT, cardiovascular, pulmonary, gi and gu systems are negative, except as otherwise noted.      Objective           Vitals:  No vitals were obtained today due to virtual visit.    Physical Exam   GENERAL: Healthy, alert and no distress  EYES: Eyes grossly normal to inspection.  No discharge or erythema, or obvious scleral/conjunctival abnormalities.  RESP: No audible wheeze, cough, or visible cyanosis.  No visible retractions or increased work of breathing.    SKIN: Visible skin clear. No significant rash, abnormal pigmentation or lesions.  NEURO: Cranial nerves grossly intact.  Mentation and speech  appropriate for age.  PSYCH: Mentation appears normal, affect normal/bright, judgement and insight intact, normal speech and appearance well-groomed.    No results found for this or any previous visit (from the past 24 hour(s)).            Video-Visit Details    Type of service:  Video Visit    Video End Time:11:25 AM    Originating Location (pt. Location): Home    Distant Location (provider location):  [facility]     Platform used for Video Visit: [employer]

## 2021-02-02 ENCOUNTER — TRANSFERRED RECORDS (OUTPATIENT)
Dept: HEALTH INFORMATION MANAGEMENT | Facility: CLINIC | Age: 86
End: 2021-02-02

## 2021-02-05 ENCOUNTER — IMMUNIZATION (OUTPATIENT)
Dept: PEDIATRICS | Facility: CLINIC | Age: 86
End: 2021-02-05
Payer: COMMERCIAL

## 2021-02-05 PROCEDURE — 0001A PR COVID VAC PFIZER DIL RECON 30 MCG/0.3 ML IM: CPT

## 2021-02-05 PROCEDURE — 91300 PR COVID VAC PFIZER DIL RECON 30 MCG/0.3 ML IM: CPT

## 2021-02-21 ENCOUNTER — MYC MEDICAL ADVICE (OUTPATIENT)
Dept: FAMILY MEDICINE | Facility: CLINIC | Age: 86
End: 2021-02-21

## 2021-02-22 ENCOUNTER — ANCILLARY PROCEDURE (OUTPATIENT)
Dept: GENERAL RADIOLOGY | Facility: CLINIC | Age: 86
End: 2021-02-22
Attending: FAMILY MEDICINE
Payer: COMMERCIAL

## 2021-02-22 ENCOUNTER — TRANSFERRED RECORDS (OUTPATIENT)
Dept: HEALTH INFORMATION MANAGEMENT | Facility: CLINIC | Age: 86
End: 2021-02-22

## 2021-02-22 ENCOUNTER — TELEPHONE (OUTPATIENT)
Dept: FAMILY MEDICINE | Facility: CLINIC | Age: 86
End: 2021-02-22

## 2021-02-22 ENCOUNTER — OFFICE VISIT (OUTPATIENT)
Dept: FAMILY MEDICINE | Facility: CLINIC | Age: 86
End: 2021-02-22
Payer: COMMERCIAL

## 2021-02-22 VITALS
TEMPERATURE: 98.1 F | BODY MASS INDEX: 23.36 KG/M2 | HEART RATE: 98 BPM | DIASTOLIC BLOOD PRESSURE: 80 MMHG | SYSTOLIC BLOOD PRESSURE: 174 MMHG | WEIGHT: 134 LBS | OXYGEN SATURATION: 99 %

## 2021-02-22 DIAGNOSIS — R21 RASH: ICD-10-CM

## 2021-02-22 DIAGNOSIS — M79.89 LEG SWELLING: Primary | ICD-10-CM

## 2021-02-22 DIAGNOSIS — Z96.651 S/P TOTAL KNEE ARTHROPLASTY, RIGHT: ICD-10-CM

## 2021-02-22 DIAGNOSIS — M25.561 RIGHT KNEE PAIN: ICD-10-CM

## 2021-02-22 DIAGNOSIS — Z00.00 ROUTINE GENERAL MEDICAL EXAMINATION AT A HEALTH CARE FACILITY: ICD-10-CM

## 2021-02-22 LAB
BASOPHILS # BLD AUTO: 0.1 10E9/L (ref 0–0.2)
BASOPHILS NFR BLD AUTO: 1 %
CRP SERPL-MCNC: <2.9 MG/L (ref 0–8)
DIFFERENTIAL METHOD BLD: ABNORMAL
EOSINOPHIL # BLD AUTO: 0.4 10E9/L (ref 0–0.7)
EOSINOPHIL NFR BLD AUTO: 5.6 %
ERYTHROCYTE [DISTWIDTH] IN BLOOD BY AUTOMATED COUNT: 14.3 % (ref 10–15)
ERYTHROCYTE [SEDIMENTATION RATE] IN BLOOD BY WESTERGREN METHOD: 15 MM/H (ref 0–30)
HCT VFR BLD AUTO: 39.4 % (ref 35–47)
HGB BLD-MCNC: 12.2 G/DL (ref 11.7–15.7)
LYMPHOCYTES # BLD AUTO: 1.3 10E9/L (ref 0.8–5.3)
LYMPHOCYTES NFR BLD AUTO: 16.5 %
MCH RBC QN AUTO: 28.2 PG (ref 26.5–33)
MCHC RBC AUTO-ENTMCNC: 31 G/DL (ref 31.5–36.5)
MCV RBC AUTO: 91 FL (ref 78–100)
MONOCYTES # BLD AUTO: 0.9 10E9/L (ref 0–1.3)
MONOCYTES NFR BLD AUTO: 11.3 %
NEUTROPHILS # BLD AUTO: 5.1 10E9/L (ref 1.6–8.3)
NEUTROPHILS NFR BLD AUTO: 65.6 %
PLATELET # BLD AUTO: 406 10E9/L (ref 150–450)
RBC # BLD AUTO: 4.32 10E12/L (ref 3.8–5.2)
WBC # BLD AUTO: 7.8 10E9/L (ref 4–11)

## 2021-02-22 PROCEDURE — 99215 OFFICE O/P EST HI 40 MIN: CPT | Performed by: FAMILY MEDICINE

## 2021-02-22 PROCEDURE — 85025 COMPLETE CBC W/AUTO DIFF WBC: CPT | Performed by: ORTHOPAEDIC SURGERY

## 2021-02-22 PROCEDURE — 86140 C-REACTIVE PROTEIN: CPT | Performed by: FAMILY MEDICINE

## 2021-02-22 PROCEDURE — 85652 RBC SED RATE AUTOMATED: CPT | Performed by: FAMILY MEDICINE

## 2021-02-22 PROCEDURE — 36415 COLL VENOUS BLD VENIPUNCTURE: CPT | Performed by: FAMILY MEDICINE

## 2021-02-22 PROCEDURE — 73562 X-RAY EXAM OF KNEE 3: CPT | Mod: RT | Performed by: RADIOLOGY

## 2021-02-22 RX ORDER — CEPHALEXIN 500 MG/1
500 CAPSULE ORAL 2 TIMES DAILY
Qty: 20 CAPSULE | Refills: 0 | Status: SHIPPED | OUTPATIENT
Start: 2021-02-22 | End: 2021-03-04

## 2021-02-22 RX ORDER — TRIAMCINOLONE ACETONIDE 1 MG/G
CREAM TOPICAL 2 TIMES DAILY
Qty: 15 G | Refills: 0 | Status: SHIPPED | OUTPATIENT
Start: 2021-02-22 | End: 2021-03-01

## 2021-02-22 NOTE — TELEPHONE ENCOUNTER
Reason for call: patient calling regarding US order and [facility] can not see patient today at 2:55 pm but wants to go to [facility], please fax order to [phone]    Can we leave a detailed message: Yes     Patient  can be reached at: [phone]    Call taken at 1:20 on 2/22/2021    Madhuri Ambriz [facility]  2nd Floor  Primary Care

## 2021-02-22 NOTE — PATIENT INSTRUCTIONS
1. Will obtain X ray and US of the right leg to make malick there is no blood clot of fluid in the knee joint     2. Labs today CPR and ESR - CBC already ordered     3. Start Cephalexin 500 mg BID for 10 days for concern of cellulitis     4. Start Kenalog cream to help with rash and itching     5. Call orthopedic surgeon tomorrow for follow up

## 2021-02-22 NOTE — TELEPHONE ENCOUNTER
[facility] to the fax number the patient provided. Called and informed patient that this was done.Sheila Nielsen MA/CHENTE

## 2021-02-22 NOTE — PROGRESS NOTES
Assessment & Plan       Rash  - triamcinolone (KENALOG) 0.1 % external cream; Apply topically 2 times daily for 7 days  S/P total knee arthroplasty, right  Leg swelling    Patient is doing well   Her symptoms are improving slowly. discussed with patient expected course   She is going to reach out to orthopedic surgeon for further  Recommendation and will let us know   I advised her complete 10 days of  Keflex then stop   continue Kenalo for 7 more days   Continue with leg elevation and icing   Patient verbalized understanding and agreed on the plan of care. All questions answered.                  Return in about 3 months (around 6/1/2021), or if symptoms worsen or fail to improve.    Elyssa Rabago MD  [facility]    Allegra Love is a 85 year old who presents for the following health issues     HPI       Patient is here for right knee and leg swelling and rash started more than 2 weeks ago   She had Right knee replacement on 01/19/2021 , followed by PT.   Had similar rash on the left knee after the left knee replacement went away with antibiotics   She was dennis to contact her orthopedic team and spoke with the PA today  who advised  her to use Aquaphor and aveeno lotion to help with itching and rash.   She took aspirin 325 mg BID for 32  Days after surgery. No lovenox.   She also took Cephalexin 500 mg 4 times for 1 week , completed on 02/16/2021  She denies any fever, chills, shortness of breath or chest pain   She reports right calf pain since the surgery she thinks it  is most likely related to her muscles . She also had calf pain before the surgery.   - XR Knee Right 3 Views: showed: Unremarkable appearance of total knee arthroplasty. Joint  effusion.  - US Lower Extremity Venous Duplex Right: negative for DVT  - ESR: Erythrocyte sedimentation rate: normal   - CRP, inflammation: normal    Patient was started on  cephALEXin (KEFLEX) 500 MG capsule; Take 1 capsule (500 mg) by mouth  2 times daily for 10 days.   She was also prescribed Kenalog cream   Today she reports her symptoms are better but completley resolved       Review of Systems   Constitutional, HEENT, cardiovascular, pulmonary, gi and gu systems are negative, except as otherwise noted.      Objective    BP (!) 155/80   Pulse 89   Temp 97.6  F (36.4  C) (Tympanic)   Wt 60.3 kg (133 lb)   SpO2 99%   BMI 23.19 kg/m    Body mass index is 23.19 kg/m .  Physical Exam  Musculoskeletal:        Legs:

## 2021-02-22 NOTE — PROGRESS NOTES
Assessment & Plan   Patient is here for right knee and leg swelling and rash started more than 1 week ago   Right knee replacement on 01/19/2021 , followed by PT.   Had similar rash on the left knee after the left knee replacement went away with antibiotics   She was dennis to contact her orthopedic team and spoke with the PA today  who advised  her to use Aquaphor and aveeno lotion to help with itching and rash.   She took aspirin 325 mg BID for 32  Days after surgery. No lovenox.   She also took Cephalexin 500 mg 4 times for 1 week , completed on 02/16/2021  She denies any fever, chills, shortness of breath or chest pain   She reports right calf pain since the surgery she thinks it  is most likely related to her muscles . She also had calf pain before the surgery.   Patient would like to send lab results to her surgeon, she has an appointment with orthopedic surgeon in 05/2021     I had a long discussion with the patient about her condition , diagnosis treatment options. We discussed diffenetial diagnosis which includes septic arthritis which is less  Likely, DVT ( US duplx to r/o) , cellulitis ( prescriebed Cephalexin for 10 day ) and allergic reaction ( prescribed Kenalog)  Red flags and warnign signs discussed in details , go to the ED if she develops any of these.   Call orthopedic surgery tomorrow to updated them   Leg swelling    - XR Knee Right 3 Views  - US Lower Extremity Venous Duplex Right  - ESR: Erythrocyte sedimentation rate  - CRP, inflammation  - cephALEXin (KEFLEX) 500 MG capsule; Take 1 capsule (500 mg) by mouth 2 times daily for 10 days    S/P total knee arthroplasty, right    - XR Knee Right 3 Views    Rash    - triamcinolone (KENALOG) 0.1 % external cream; Apply topically 2 times daily for 5 days      Patient Instructions   1. Will obtain X ray and US of the right leg to make malick there is no blood clot of fluid in the knee joint     2. Labs today CPR and ESR - CBC already ordered     3.  Start Cephalexin 500 mg BID for 10 days for concern of cellulitis     4. Start Kenalog cream to help with rash and itching     5. Call orthopedic surgeon tomorrow for follow up           52 minutes spent on the date of the encounter doing chart review, history and exam, documentation and further activities as noted above           Return in about 1 week (around 3/1/2021).    Elyssa Rabago MD  [facility] MARCELINO Love is a 85 year old who presents for the following health issues     HPI       Rash  Onset/Duration: a week after knee surgery on 1/19/2021  Description  Location: right knee  Character: red, warm, scaly, swollen  Itching: moderate  Intensity:  moderate  Progression of Symptoms:  same  Accompanying signs and symptoms:   Fever: No   Body aches or joint pain: no  Sore throat symptoms: no  Recent cold symptoms: no  History:           Previous episodes of similar rash: had a similar rash when had left knee replacement  New exposures:  None  Recent travel: no  Exposure to similar rash: no  Precipitating or alleviating factors:   Therapies tried and outcome: cephalexin    Patient is here for right knee and leg swelling and rash started more than 1 week ago   Right knee replacement on 01/19/2021 , followed by PT.   Had similar rash on the left knee after the left knee replacement went away with antibiotics   She was dennis to contact her orthopedic team and spoke with the PA today  who advised  her to use Aquaphor and aveeno lotion to help with itching and rash.   She took aspirin 325 mg BID for 32  Days after surgery. No lovenox.   She also took Cephalexin 500 mg 4 times for 1 week , completed on 02/16/2021  She denies any fever, chills, shortness of breath or chest pain   She reports right calf pain since the surgery she thinks it  is most likely related to her muscles . She also had calf pain before the surgery.   Patient would like to send lab results to her surgeon, she has an  appointment with orthopedic surgeon in 05/2021    She is planning to go to [place] in the second week in March  Review of Systems   Constitutional, HEENT, cardiovascular, pulmonary, gi and gu systems are negative, except as otherwise noted.      Objective    BP (!) 174/80   Pulse 98   Temp 98.1  F (36.7  C) (Tympanic)   Wt 60.8 kg (134 lb)   SpO2 99%   BMI 23.36 kg/m    Body mass index is 23.36 kg/m .  Physical Exam  Vitals signs and nursing note reviewed.   Constitutional:       General: She is not in acute distress.     Appearance: She is not ill-appearing, toxic-appearing or diaphoretic.   Musculoskeletal:        Legs:    Neurological:      Mental Status: She is alert.

## 2021-02-26 ENCOUNTER — IMMUNIZATION (OUTPATIENT)
Dept: PEDIATRICS | Facility: CLINIC | Age: 86
End: 2021-02-26
Attending: INTERNAL MEDICINE
Payer: COMMERCIAL

## 2021-02-26 PROCEDURE — 0002A PR COVID VAC PFIZER DIL RECON 30 MCG/0.3 ML IM: CPT

## 2021-02-26 PROCEDURE — 91300 PR COVID VAC PFIZER DIL RECON 30 MCG/0.3 ML IM: CPT

## 2021-03-01 ENCOUNTER — OFFICE VISIT (OUTPATIENT)
Dept: FAMILY MEDICINE | Facility: CLINIC | Age: 86
End: 2021-03-01
Payer: COMMERCIAL

## 2021-03-01 VITALS
DIASTOLIC BLOOD PRESSURE: 80 MMHG | SYSTOLIC BLOOD PRESSURE: 155 MMHG | BODY MASS INDEX: 23.19 KG/M2 | TEMPERATURE: 97.6 F | HEART RATE: 89 BPM | WEIGHT: 133 LBS | OXYGEN SATURATION: 99 %

## 2021-03-01 DIAGNOSIS — M79.89 LEG SWELLING: ICD-10-CM

## 2021-03-01 DIAGNOSIS — R21 RASH: Primary | ICD-10-CM

## 2021-03-01 DIAGNOSIS — Z96.651 S/P TOTAL KNEE ARTHROPLASTY, RIGHT: ICD-10-CM

## 2021-03-01 PROCEDURE — 99213 OFFICE O/P EST LOW 20 MIN: CPT | Performed by: FAMILY MEDICINE

## 2021-03-01 RX ORDER — TRIAMCINOLONE ACETONIDE 1 MG/G
CREAM TOPICAL 2 TIMES DAILY
Qty: 15 G | Refills: 0 | Status: SHIPPED | OUTPATIENT
Start: 2021-03-01 | End: 2021-03-08

## 2021-03-02 NOTE — NURSING NOTE
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 No   BMI 23.89 kg/m   Estimated body mass index is 23.89 kg/m  as calculated from the following:    Height as of this encounter: 1.613 m (5' 3.5\").    Weight as of this encounter: 62.1 kg (137 lb). Body surface area is 1.67 meters squared.  No Pain (0) Comment: Data Unavailable   No LMP recorded. Patient is postmenopausal.  Allergies reviewed: Yes  Medications reviewed: Yes    Medications: Medication refills not needed today.  Pharmacy name entered into [employer]:    [facility] [address] [place], [place] - [address] [facility] AT [facility] [address] [place] [facility] [address] [place] [id number] [address] [facility] [place], [place] [address] [facility] [address] - [place] [address] [place] [address] PHARMACY MAIL DELIVERY - [facility] [address] [facility] [address]  [facility] [address] [place], [place] [facility] [address] [facility]    Clinical concerns: Patient has concerns regarding her diagnosis and treatment plans but no new concerns.  Dr. Durand  was notified.    10 minutes for nursing intake (face to face time)     Missy Hammond              " No

## 2021-03-03 ENCOUNTER — MYC MEDICAL ADVICE (OUTPATIENT)
Dept: FAMILY MEDICINE | Facility: CLINIC | Age: 86
End: 2021-03-03

## 2021-03-03 NOTE — TELEPHONE ENCOUNTER
Message routed to provider for the update as below       - please fax results as per patient request below    Rubina JUAREZN, RN, CPN

## 2021-03-30 ENCOUNTER — TRANSFERRED RECORDS (OUTPATIENT)
Dept: HEALTH INFORMATION MANAGEMENT | Facility: CLINIC | Age: 86
End: 2021-03-30

## 2021-06-02 ASSESSMENT — ENCOUNTER SYMPTOMS
WEAKNESS: 0
PALPITATIONS: 0
CHILLS: 0
SHORTNESS OF BREATH: 0
ABDOMINAL PAIN: 0
NAUSEA: 0
HEMATURIA: 0
JOINT SWELLING: 1
PARESTHESIAS: 0
SORE THROAT: 0
FREQUENCY: 1
COUGH: 0
ARTHRALGIAS: 0
EYE PAIN: 0
NERVOUS/ANXIOUS: 0
FEVER: 0
CONSTIPATION: 0
DIZZINESS: 0
HEARTBURN: 0
HEMATOCHEZIA: 0
HEADACHES: 0
BREAST MASS: 0
DYSURIA: 0
DIARRHEA: 0
MYALGIAS: 0

## 2021-06-02 ASSESSMENT — ACTIVITIES OF DAILY LIVING (ADL): CURRENT_FUNCTION: NO ASSISTANCE NEEDED

## 2021-06-02 NOTE — PROGRESS NOTES
"  SUBJECTIVE:   Kate Isaac is a 85 year old female who presents for Preventive Visit.    {Split Bill scripting  The purpose of this visit is to discuss your medical history and prevent health problems before you are sick. You may be responsible for a co-pay, coinsurance, or deductible if your visit today includes services such as checking on a sore throat, having an x-ray or lab test, or treating and evaluating a new or existing condition :522690}  Patient has been advised of split billing requirements and indicates understanding: {Yes and No:284250}  Are you in the first 12 months of your Medicare Part B coverage?  { :632895::\"No\"}    Physical Health:    In general, how would you rate your overall physical health? { :206641}    Outside of work, how many days during the week do you exercise? { :135379}    Outside of work, approximately how many minutes a day do you exercise?{ :107907}    If you drink alcohol do you typically have >3 drinks per day or >7 drinks per week? { :483905}    Do you usually eat at least 4 servings of fruit and vegetables a day, include whole grains & fiber and avoid regularly eating high fat or \"junk\" foods? { :172848::\"Yes\"}    Do you have any problems taking medications regularly?  { :648094::\"No\"}    Do you have any side effects from medications? { :987048}    Needs assistance for the following daily activities: { :939797}    Which of the following safety concerns are present in your home?  { :603268::\"none identified\"}     Hearing impairment: { :825337}    In the past 6 months, have you been bothered by leaking of urine? { :307533}    Mental Health:    In general, how would you rate your overall mental or emotional health? { :352327}  PHQ-2 Score: (P) 0    Do you feel safe in your environment? { :541306}    Have you ever done Advance Care Planning? (For example, a Health Directive, POLST, or a discussion with a medical provider or your loved ones about your wishes): { " ":280556}    Additional concerns to address?  {If YES, notify patient they may be responsible for a copay, coinsurance or deductible if the visit today includes services such as checking on a sore throat, having an x-ray or lab test, or treating and evaluating a new or existing condition :692428::\"No\"}    Fall risk:  { :400761}  {If any of the above assessments are answered yes, consider ordering appropriate referrals (Optional):842853::\"click delete button to remove this line now\"}  Cognitive Screening: { :810339}    {Do you have sleep apnea, excessive snoring or daytime drowsiness? (Optional):555803}    {Outside tests to abstract? :669180}    {additional problems to add (Optional):289212}    Reviewed and updated as needed this visit by clinical staff                 Reviewed and updated as needed this visit by Provider                Social History     Tobacco Use     Smoking status: Former Smoker     Packs/day: 1.00     Years: 32.00     Pack years: 32.00     Types: Cigarettes     Start date: 1956     Quit date: 1985     Years since quittin.4     Smokeless tobacco: Never Used     Tobacco comment: I stopped smoking several times from  to    Substance Use Topics     Alcohol use: Yes     Alcohol/week: 2.5 - 3.3 standard drinks     Comment: Red wine                           Current providers sharing in care for this patient include: {Rooming staff:  Please update Care Team in Rooming Activity, refresh this note and then delete this statement}  Patient Care Team:  Lani Valencia MD as PCP - General  Lani Valencia MD as Assigned PCP  Meredith Humphries RN as Clinic Care Coordinator (Nurse)  Casey Lucia DO as Assigned Musculoskeletal Provider    The following health maintenance items are reviewed in Epic and correct as of today:  Health Maintenance   Topic Date Due     ANNUAL REVIEW OF HM ORDERS  Never done     COLORECTAL CANCER SCREENING  2018     MEDICARE ANNUAL " "WELLNESS VISIT  2020     DEXA  2021     FALL RISK ASSESSMENT  2022     DTAP/TDAP/TD IMMUNIZATION (4 - Td) 2024     ADVANCE CARE PLANNING  2024     PHQ-2  Completed     INFLUENZA VACCINE  Completed     Pneumococcal Vaccine: 65+ Years  Completed     ZOSTER IMMUNIZATION  Completed     COVID-19 Vaccine  Completed     IPV IMMUNIZATION  Aged Out     MENINGITIS IMMUNIZATION  Aged Out     HEPATITIS B IMMUNIZATION  Aged Out     {Chronicprobdata (Optional):619051}  {Decision Support (Optional):076490}    ROS:  {ROS COMP:258024}    OBJECTIVE:   There were no vitals taken for this visit. Estimated body mass index is 23.19 kg/m  as calculated from the following:    Height as of 21: 1.613 m (5' 3.5\").    Weight as of 3/1/21: 60.3 kg (133 lb).  EXAM:   {Exam :477652}    {Diagnostic Test Results (Optional):542249::\"Diagnostic Test Results:\",\"Labs reviewed in Epic\"}    ASSESSMENT / PLAN:   {Diag Picklist:396607}    Patient has been advised of split billing requirements and indicates understanding: {YES / NO:047618::\"Yes\"}    COUNSELING:  {Medicare Counselin}    Estimated body mass index is 23.19 kg/m  as calculated from the following:    Height as of 21: 1.613 m (5' 3.5\").    Weight as of 3/1/21: 60.3 kg (133 lb).    {Weight Management Plan (ACO) Complete if BMI is abnormal-  Ages 18-64  BMI >24.9.  Age 65+ with BMI <23 or >30 (Optional):696521}    She reports that she quit smoking about 36 years ago. Her smoking use included cigarettes. She started smoking about 65 years ago. She has a 32.00 pack-year smoking history. She has never used smokeless tobacco.    Appropriate preventive services were discussed with this patient, including applicable screening as appropriate for cardiovascular disease, diabetes, osteopenia/osteoporosis, and glaucoma.  As appropriate for age/gender, discussed screening for colorectal cancer, prostate cancer, breast cancer, and cervical cancer. Checklist " reviewing preventive services available has been given to the patient.    Reviewed patients plan of care and provided an AVS. The {CarePlan:457459} for Kate meets the Care Plan requirement. This Care Plan has been established and reviewed with the {PATIENT, FAMILY MEMBER, CAREGIVER:068084}.    Counseling Resources:  ATP IV Guidelines  Pooled Cohorts Equation Calculator  Breast Cancer Risk Calculator  BRCA-Related Cancer Risk Assessment: FHS-7 Tool  FRAX Risk Assessment  ICSI Preventive Guidelines  Dietary Guidelines for Americans, 2010  USDA's MyPlate  ASA Prophylaxis  Lung CA Screening    Lani Valencia MD  [facility]

## 2021-06-02 NOTE — PROGRESS NOTES
"SUBJECTIVE:   Kate Isaac is a 85 year old female who presents for Preventive Visit.      Patient has been advised of split billing requirements and indicates understanding: Yes   Are you in the first 12 months of your Medicare coverage?  No    Healthy Habits:     In general, how would you rate your overall health?  Good    Frequency of exercise:  6-7 days/week    Duration of exercise:  30-45 minutes    Do you usually eat at least 4 servings of fruit and vegetables a day, include whole grains    & fiber and avoid regularly eating high fat or \"junk\" foods?  Yes    Taking medications regularly:  Yes    Medication side effects:  Not applicable    Ability to successfully perform activities of daily living:  No assistance needed    Home Safety:  No safety concerns identified    Hearing Impairment:  Difficulty following a conversation in a noisy restaurant or crowded room    In the past 6 months, have you been bothered by leaking of urine?  No    In general, how would you rate your overall mental or emotional health?  Good      PHQ-2 Total Score: 0    Additional concerns today:  Yes    Do you feel safe in your environment? YES    Have you ever done Advance Care Planning? (For example, a Health Directive, POLST, or a discussion with a medical provider or your loved ones about your wishes): Yes, advance care planning is on file.       Fall risk       Cognitive Screening   1) Repeat 3 items (Leader, Season, Table)    2) Clock draw: NORMAL  3) 3 item recall: Recalls 3 objects  Results: 3 items recalled: COGNITIVE IMPAIRMENT LESS LIKELY    Mini-CogTM Copyright STACEY Jacques. Licensed by the author for use in [facility]; reprinted with permission (dipti@.[facility]). All rights reserved.      Do you have sleep apnea, excessive snoring or daytime drowsiness?: no    Reviewed and updated as needed this visit by clinical staff  Tobacco  Allergies  Meds              Reviewed and updated as needed this visit by Provider    "             Social History     Tobacco Use     Smoking status: Former Smoker     Packs/day: 1.00     Years: 32.00     Pack years: 32.00     Types: Cigarettes     Start date: 1956     Quit date: 1985     Years since quittin.4     Smokeless tobacco: Never Used     Tobacco comment: I stopped smoking several times from  to    Substance Use Topics     Alcohol use: Yes     Alcohol/week: 2.5 - 3.3 standard drinks     Comment: Red wine         Alcohol Use 2021   Prescreen: >3 drinks/day or >7 drinks/week? No   Prescreen: >3 drinks/day or >7 drinks/week? -               Current providers sharing in care for this patient include:   Patient Care Team:  Lani Valencia MD as PCP - General  Lani Valencia MD as Assigned PCP  Meredith Humphries RN as Clinic Care Coordinator (Nurse)  Casey Lucia DO as Assigned Musculoskeletal Provider    The following health maintenance items are reviewed in Epic and correct as of today:  Health Maintenance Due   Topic Date Due     ANNUAL REVIEW OF HM ORDERS  Never done     COLORECTAL CANCER SCREENING  2018     Lab work is in process  Mammogram Screening: Mammogram Screening - Patient over age 75, has elected to continue with screening.    Mammogram Screening - Patient over age 75, has elected to continue with screening.  Pertinent mammograms are reviewed under the imaging tab.    Review of Systems   Constitutional: Negative for chills and fever.   HENT: Negative for congestion, ear pain, hearing loss and sore throat.    Eyes: Negative for pain and visual disturbance.   Respiratory: Negative for cough and shortness of breath.    Cardiovascular: Positive for peripheral edema. Negative for chest pain and palpitations.   Gastrointestinal: Negative for abdominal pain, constipation, diarrhea, heartburn, hematochezia and nausea.   Breasts:  Negative for tenderness, breast mass and discharge.   Genitourinary: Positive for frequency. Negative for  "dysuria, genital sores, hematuria, pelvic pain, urgency, vaginal bleeding and vaginal discharge.   Musculoskeletal: Positive for joint swelling. Negative for arthralgias and myalgias.   Skin: Positive for rash.   Neurological: Negative for dizziness, weakness, headaches and paresthesias.   Psychiatric/Behavioral: Negative for mood changes. The patient is not nervous/anxious.      Constitutional, HEENT, cardiovascular, pulmonary, gi and gu systems are negative, except as otherwise noted.    OBJECTIVE:   /74   Pulse 87   Temp 98.2  F (36.8  C) (Oral)   Ht 1.613 m (5' 3.5\")   Wt 58.5 kg (129 lb)   BMI 22.49 kg/m   Estimated body mass index is 22.49 kg/m  as calculated from the following:    Height as of this encounter: 1.613 m (5' 3.5\").    Weight as of this encounter: 58.5 kg (129 lb).  Physical Exam  GENERAL: healthy, alert and no distress  EYES: Eyes grossly normal to inspection, PERRL and conjunctivae and sclerae normal  HENT: ear canals and TM's normal, nose and mouth without ulcers or lesions  NECK: no adenopathy, no asymmetry, masses, or scars and thyroid normal to palpation  RESP: lungs clear to auscultation - no rales, rhonchi or wheezes  CV: regular rate and rhythm, normal S1 S2, no S3 or S4, no murmur, click or rub, no peripheral edema and peripheral pulses strong  ABDOMEN: soft, nontender, no hepatosplenomegaly, no masses and bowel sounds normal  MS: no gross musculoskeletal defects noted, no edema  SKIN: no suspicious lesions or rashes  NEURO: Normal strength and tone, mentation intact and speech normal  PSYCH: mentation appears normal, affect normal/bright    Diagnostic Test Results:  Labs reviewed in Epic    ASSESSMENT / PLAN:     3. Encounter for Medicare annual wellness exam  completed    4. Malignant neoplasm of female breast, unspecified estrogen receptor status, unspecified laterality, unspecified site of breast (H)  Diagnosed in 1990s, gets yealrly mammograms    5. Hypertension goal BP " "(blood pressure) < 140/90  At goal on meds, due for refill and labwork  - amLODIPine (NORVASC) 5 MG tablet; Take 1 tablet (5 mg) by mouth daily  Dispense: 90 tablet; Refill: 3  - losartan (COZAAR) 50 MG tablet; Take 1 tablet (50 mg) by mouth daily  Dispense: 90 tablet; Refill: 3  - Basic metabolic panel    6. Osteopenia of spine  Due for dexa next year    7. Screening cholesterol level    - Lipid panel reflex to direct LDL Fasting    9. Visit for screening mammogram  scheduled  - *MA Screening Digital Bilateral; Future    Patient has been advised of split billing requirements and indicates understanding: Yes  COUNSELING:  Reviewed preventive health counseling, as reflected in patient instructions       Regular exercise       Healthy diet/nutrition       Vision screening       Dental care    Estimated body mass index is 22.49 kg/m  as calculated from the following:    Height as of this encounter: 1.613 m (5' 3.5\").    Weight as of this encounter: 58.5 kg (129 lb).        She reports that she quit smoking about 36 years ago. Her smoking use included cigarettes. She started smoking about 65 years ago. She has a 32.00 pack-year smoking history. She has never used smokeless tobacco.      Appropriate preventive services were discussed with this patient, including applicable screening as appropriate for cardiovascular disease, diabetes, osteopenia/osteoporosis, and glaucoma.  As appropriate for age/gender, discussed screening for colorectal cancer, prostate cancer, breast cancer, and cervical cancer. Checklist reviewing preventive services available has been given to the patient.    Reviewed patients plan of care and provided an AVS. The Intermediate Care Plan ( asthma action plan, low back pain action plan, and migraine action plan) for Kate meets the Care Plan requirement. This Care Plan has been established and reviewed with the Patient.    Counseling Resources:  ATP IV Guidelines  Pooled Cohorts Equation " Calculator  Breast Cancer Risk Calculator  Breast Cancer: Medication to Reduce Risk  FRAX Risk Assessment  ICSI Preventive Guidelines  Dietary Guidelines for Americans, 2010  [email or web address].Me's MyPlate  ASA Prophylaxis  Lung CA Screening    Lani Valencia MD  [facility]    Identified Health Risks:

## 2021-06-02 NOTE — PATIENT INSTRUCTIONS
Patient Education   Personalized Prevention Plan  You are due for the preventive services outlined below.  Your care team is available to assist you in scheduling these services.  If you have already completed any of these items, please share that information with your care team to update in your medical record.  Health Maintenance Due   Topic Date Due     ANNUAL REVIEW OF  ORDERS  Never done     Colorectal Cancer Screening  2018        Preparing for Your Surgery  Getting started  A nurse will call you to review your health history and instructions. They will give you an arrival time based on your scheduled surgery time.  Please be ready to share the following:    Your doctor's clinic name and phone number    Your medical, surgical and anesthesia history    A list of allergies and sensitivities    A list of medicines, including herbal treatments and over-the-counter drugs    Whether the patient has a legal guardian (ask how to send us the papers in advance)  If you have a child who's having surgery, please ask for a copy of Preparing for Your Child's Surgery.    Preparing for surgery    Within 30 days of surgery: Have a pre-op exam (sometimes called an H&P, or History and Physical). This can be done at a clinic or pre-operative center.  ? If you're having a , you may not need this exam. Talk to your care team    At your pre-op exam, talk to your care team about all medicines you take. If you need to stop any medicines before surgery, ask when to start taking them again.  ? We do this for your safety. Many medicines can make you bleed too much during surgery. Some change how well surgery (anesthesia) drugs work.    Call your insurance company to let them know you're having surgery. (If you don't have insurance, call [phone].)    Call your clinic if there's any change in your health. This includes signs of a cold or flu (sore throat, runny nose, cough, rash, fever). It also includes a scrape or  scratch near the surgery site.    If you have questions on the day of surgery, call your hospital or surgery center.  Eating and drinking guidelines  For your safety: Unless your surgeon tells you otherwise, follow the guidelines below.    Eat and drink as usual until 8 hours before surgery. After that, no food or milk.    Drink clear liquids until 2 hours before surgery. These are liquids you can see through, like water, Gatorade and Propel Water. You may also have black coffee and tea (no cream or milk).    Nothing by mouth within 2 hours of surgery. This includes gum, candy and breath mints.    If you drink, stop drinking alcohol the night before surgery.    If your care team tells you to take medicine on the morning of surgery, it's okay to take it with a sip of water.  Preventing infection    Shower or bathe the night before and morning of your surgery. Follow the instructions your clinic gave you. (If no instructions, use regular soap.)    Don't shave or clip hair near your surgery site. We'll remove the hair if needed.    Don't smoke or vape the morning of surgery. You may chew nicotine gum up to 2 hours before surgery. A nicotine patch is okay.  ? Note: Some surgeries require you to completely quit smoking and nicotine. Check with your surgeon.    Your care team will make every effort to keep you safe from infection. We will:  ? Clean our hands often with soap and water (or an alcohol-based hand rub).  ? Clean the skin at your surgery site with a special soap that kills germs.  ? Give you a special gown to keep you warm. (Cold raises the risk of infection.)  ? Wear special hair covers, masks, gowns and gloves during surgery.  ? Give antibiotic medicine, if prescribed. Not all surgeries need antibiotics.  What to bring on the day of surgery    Photo ID and insurance card    Copy of your health care directive, if you have one    Glasses and hearing aides (bring cases)  ? You can't wear contacts during  surgery    Inhaler and eye drops, if you use them (tell us about these when you arrive)    CPAP machine or breathing device, if you use them    A few personal items, if spending the night    If you have . . .  ? A pacemaker or ICD (cardiac defibrillator): Bring the ID card.  ? An implanted stimulator: Bring the remote control.  ? A legal guardian: Bring a copy of the certified (court-stamped) guardianship papers.  Please remove any jewelry, including body piercings. Leave jewelry and other valuables at home.  If you're going home the day of surgery  Important: If you don't follow the rules below, we must cancel your surgery.     Arrange for someone to drive you home after surgery. You may not drive, take a taxi or take public transportation by yourself (unless you'll have local anesthesia only).    Arrange for a responsible adult to stay with you overnight. If you don't, we may keep you in the hospital overnight, and you may need to pay the costs yourself.  Questions?   If you have any questions for your care team, list them here: _________________________________________________________________________________________________________________________________________________________________________________________________________________________________________________________________________________________________________________________  For informational purposes only. Not to replace the advice of your health care provider. Copyright   2003, 2019 [facility]. All rights reserved. Clinically reviewed by Carol Padron MD. SMARTworks 787882 - REV 4/20.

## 2021-06-03 ENCOUNTER — OFFICE VISIT (OUTPATIENT)
Dept: FAMILY MEDICINE | Facility: CLINIC | Age: 86
End: 2021-06-03
Payer: COMMERCIAL

## 2021-06-03 VITALS
HEIGHT: 64 IN | HEART RATE: 87 BPM | DIASTOLIC BLOOD PRESSURE: 74 MMHG | WEIGHT: 129 LBS | TEMPERATURE: 98.2 F | SYSTOLIC BLOOD PRESSURE: 134 MMHG | BODY MASS INDEX: 22.02 KG/M2

## 2021-06-03 DIAGNOSIS — Z00.00 ENCOUNTER FOR MEDICARE ANNUAL WELLNESS EXAM: ICD-10-CM

## 2021-06-03 DIAGNOSIS — Z01.818 PREOP GENERAL PHYSICAL EXAM: Primary | ICD-10-CM

## 2021-06-03 DIAGNOSIS — Z12.31 VISIT FOR SCREENING MAMMOGRAM: ICD-10-CM

## 2021-06-03 DIAGNOSIS — M85.88 OSTEOPENIA OF SPINE: ICD-10-CM

## 2021-06-03 DIAGNOSIS — C50.919 MALIGNANT NEOPLASM OF FEMALE BREAST, UNSPECIFIED ESTROGEN RECEPTOR STATUS, UNSPECIFIED LATERALITY, UNSPECIFIED SITE OF BREAST (H): ICD-10-CM

## 2021-06-03 DIAGNOSIS — I10 HYPERTENSION GOAL BP (BLOOD PRESSURE) < 140/90: ICD-10-CM

## 2021-06-03 DIAGNOSIS — Z13.220 SCREENING CHOLESTEROL LEVEL: ICD-10-CM

## 2021-06-03 DIAGNOSIS — H26.9 CATARACT, UNSPECIFIED CATARACT TYPE, UNSPECIFIED LATERALITY: ICD-10-CM

## 2021-06-03 LAB
ANION GAP SERPL CALCULATED.3IONS-SCNC: 6 MMOL/L (ref 3–14)
BUN SERPL-MCNC: 12 MG/DL (ref 7–30)
CALCIUM SERPL-MCNC: 9.1 MG/DL (ref 8.5–10.1)
CHLORIDE SERPL-SCNC: 105 MMOL/L (ref 94–109)
CHOLEST SERPL-MCNC: 214 MG/DL
CO2 SERPL-SCNC: 28 MMOL/L (ref 20–32)
CREAT SERPL-MCNC: 0.54 MG/DL (ref 0.52–1.04)
GFR SERPL CREATININE-BSD FRML MDRD: 87 ML/MIN/{1.73_M2}
GLUCOSE SERPL-MCNC: 105 MG/DL (ref 70–99)
HDLC SERPL-MCNC: 80 MG/DL
LDLC SERPL CALC-MCNC: 122 MG/DL
NONHDLC SERPL-MCNC: 134 MG/DL
POTASSIUM SERPL-SCNC: 4.6 MMOL/L (ref 3.4–5.3)
SODIUM SERPL-SCNC: 139 MMOL/L (ref 133–144)
TRIGL SERPL-MCNC: 62 MG/DL

## 2021-06-03 PROCEDURE — 99397 PER PM REEVAL EST PAT 65+ YR: CPT | Performed by: FAMILY MEDICINE

## 2021-06-03 PROCEDURE — 99214 OFFICE O/P EST MOD 30 MIN: CPT | Mod: 25 | Performed by: FAMILY MEDICINE

## 2021-06-03 PROCEDURE — 80061 LIPID PANEL: CPT | Performed by: FAMILY MEDICINE

## 2021-06-03 PROCEDURE — 36415 COLL VENOUS BLD VENIPUNCTURE: CPT | Performed by: FAMILY MEDICINE

## 2021-06-03 PROCEDURE — 80048 BASIC METABOLIC PNL TOTAL CA: CPT | Performed by: FAMILY MEDICINE

## 2021-06-03 RX ORDER — AMLODIPINE BESYLATE 5 MG/1
5 TABLET ORAL DAILY
Qty: 90 TABLET | Refills: 3 | Status: SHIPPED | OUTPATIENT
Start: 2021-06-03 | End: 2022-05-02

## 2021-06-03 RX ORDER — LOSARTAN POTASSIUM 50 MG/1
50 TABLET ORAL DAILY
Qty: 90 TABLET | Refills: 3 | Status: SHIPPED | OUTPATIENT
Start: 2021-06-03 | End: 2022-05-02

## 2021-06-03 ASSESSMENT — ENCOUNTER SYMPTOMS
DIZZINESS: 0
HEMATOCHEZIA: 0
DYSURIA: 0
PALPITATIONS: 0
ARTHRALGIAS: 0
EYE PAIN: 0
DIARRHEA: 0
HEMATURIA: 0
FEVER: 0
SORE THROAT: 0
NAUSEA: 0
BREAST MASS: 0
CONSTIPATION: 0
WEAKNESS: 0
ABDOMINAL PAIN: 0
MYALGIAS: 0
JOINT SWELLING: 1
CHILLS: 0
HEADACHES: 0
FREQUENCY: 1
COUGH: 0
HEARTBURN: 0
NERVOUS/ANXIOUS: 0
PARESTHESIAS: 0
SHORTNESS OF BREATH: 0

## 2021-06-03 ASSESSMENT — MIFFLIN-ST. JEOR: SCORE: 1017.2

## 2021-06-03 ASSESSMENT — ACTIVITIES OF DAILY LIVING (ADL): CURRENT_FUNCTION: NO ASSISTANCE NEEDED

## 2021-06-03 NOTE — PROGRESS NOTES
[facility]  [address] [address] [address]  Phone: [phone]  Primary Provider: Lani Valencia  Pre-op Performing Provider: LANI VALENCIA      PREOPERATIVE EVALUATION:  Today's date: 6/3/2021    Kate Isaac is a 85 year old female who presents for a preoperative evaluation.    Surgical Information:  Surgery/Procedure: cataract  Surgery Location: [facility]  Surgeon: Astrid Vo Date: June 18 and 25  Time of Surgery: TBD   Where patient plans to recover: At home with family  Fax number for surgical facility: [phone]    Type of Anesthesia Anticipated: to be determined    Assessment & Plan     The proposed surgical procedure is considered LOW risk.    Preop general physical exam      Cataract, unspecified cataract type, unspecified laterality  Bilateral replacement      Hypertension goal BP (blood pressure) < 140/90  At goal on meds  - amLODIPine (NORVASC) 5 MG tablet; Take 1 tablet (5 mg) by mouth daily  - losartan (COZAAR) 50 MG tablet; Take 1 tablet (50 mg) by mouth daily  - Basic metabolic panel           Risks and Recommendations:  The patient has the following additional risks and recommendations for perioperative complications:   - No identified additional risk factors other than previously addressed    Medication Instructions:   - ACE/ARB: May be continued on the day of surgery.    - Calcium Channel Blockers: May be continued on the day of surgery.    RECOMMENDATION:  APPROVAL GIVEN to proceed with proposed procedure, without further diagnostic evaluation.                      Subjective     HPI related to upcoming procedure: pt to undergo treatment for cataracts    Preop Questions 6/3/2021   1. Have you ever had a heart attack or stroke? No   2. Have you ever had surgery on your heart or blood vessels, such as a stent placement, a coronary artery bypass, or surgery on an artery in your head, neck, heart, or legs? No    3. Do you have chest pain with activity? No   4. Do you have a history of  heart failure? No   5. Do you currently have a cold, bronchitis or symptoms of other infection? No   6. Do you have a cough, shortness of breath, or wheezing? No   7. Do you or anyone in your family have previous history of blood clots? No   8. Do you or does anyone in your family have a serious bleeding problem such as prolonged bleeding following surgeries or cuts? No   9. Have you ever had problems with anemia or been told to take iron pills? YES - hgb in 2/21 is normal   10. Have you had any abnormal blood loss such as black, tarry or bloody stools, or abnormal vaginal bleeding? No   11. Have you ever had a blood transfusion? No   12. Are you willing to have a blood transfusion if it is medically needed before, during, or after your surgery? Yes   13. Have you or any of your relatives ever had problems with anesthesia? No   14. Do you have sleep apnea, excessive snoring or daytime drowsiness? No   15. Do you have any artifical heart valves or other implanted medical devices like a pacemaker, defibrillator, or continuous glucose monitor? No   16. Do you have artificial joints? YES - bilateral knee replacements   17. Are you allergic to latex? No       Health Care Directive:  Patient has a Health Care Directive on file      Preoperative Review of :   reviewed - no record of controlled substances prescribed.      Status of Chronic Conditions:  HYPERTENSION - Patient has longstanding history of HTN , currently denies any symptoms referable to elevated blood pressure. Specifically denies chest pain, palpitations, dyspnea, orthopnea, PND or peripheral edema. Blood pressure readings have been in normal range. Current medication regimen is as listed below. Patient denies any side effects of medication.       Review of Systems  Constitutional, neuro, ENT, endocrine, pulmonary, cardiac, gastrointestinal, genitourinary, musculoskeletal,  integument and psychiatric systems are negative, except as otherwise noted.    Patient Active Problem List    Diagnosis Date Noted     Essential hypertension with goal blood pressure less than 140/90 05/17/2016     Priority: Medium     Female stress incontinence 07/22/2013     Priority: Medium     Urge incontinence 07/30/2012     Priority: Medium     Advance Care Planning 07/06/2011     Priority: Medium     Advance Care Planning: Receipt of ACP document:  Received: Health Care Directive which was witnessed or notarized on 9-10-93.  Document previously scanned on 12-9-13.  Validation form completed and sent to be scanned.  Code Status needs to be updated to reflect choices in most recent ACP document..  Confirmed/documented designated decision maker(s). See permanent comments section of demographics in clinical tab. View document(s) and details by clicking on code status. Added by Yesenia Oor RN, System ACP Coordinator on 3/28/2014.             Seasonal allergies 07/06/2011     Priority: Medium     CARDIOVASCULAR SCREENING; LDL GOAL LESS THAN 130 07/06/2011     Priority: Medium     Osteopenia      Priority: Medium     Recurrent UTI      Priority: Medium     Breast cancer (H)      Priority: Medium     invasive ductal       Malignant basal cell neoplasm of skin      Priority: Medium     sees dermatologist  Do you wish to do the replacement in the background? yes          Past Medical History:   Diagnosis Date     Arthritis 02/01/2014    Per pt had it since FEB     Basal cell cancer     sees dermatologist     Breast cancer (H) 1996    invasive ductal     HTN      Osteopenia      Recurrent UTI      Past Surgical History:   Procedure Laterality Date     BIOPSY  1996 2004,2010    basal cell cancers     BUNIONECTOMY RT/LT       [facility] AFF SURGICAL PATHOLOGY      ganglion cyst removal     COLONOSCOPY       GENITOURINARY SURGERY       HC EXCISION BREAST LESION, OPEN >=1  1996    right breast     SLING TRANSVAGINAL  12/9/2013     Procedure: SLING TRANSVAGINAL;  Cysto with TVT;  Surgeon: Denia Shultz MD;  Location: [facility] OR     SURGICAL HISTORY OF -       right thigh tumor removal/benign     Current Outpatient Medications   Medication Sig Dispense Refill     amLODIPine (NORVASC) 5 MG tablet Take 1 tablet (5 mg) by mouth daily 90 tablet 3     calcium carb 1250 mg, 500 mg [place],/vitamin D 200 units (OSCAL WITH D) 500-200 MG-UNIT per tablet Take 1 tablet by mouth 2 times daily (with meals)       FIBER PO Take 2 capsules by mouth At Bedtime        Flaxseed, Linseed, (FLAX PO)        GLUCOSAMINE CHONDROITIN OR TABS 1 twice daily       losartan (COZAAR) 50 MG tablet Take 1 tablet (50 mg) by mouth daily 90 tablet 3     Multiple Vitamins-Minerals (PRESERVISION AREDS 2) CAPS One in AM and one in PM         Allergies   Allergen Reactions     Ceftriaxone Swelling     Sulfa Drugs Rash     Lisinopril Fatigue        Social History     Tobacco Use     Smoking status: Former Smoker     Packs/day: 1.00     Years: 32.00     Pack years: 32.00     Types: Cigarettes     Start date: 1956     Quit date: 1985     Years since quittin.4     Smokeless tobacco: Never Used     Tobacco comment: I stopped smoking several times from  to    Substance Use Topics     Alcohol use: Yes     Alcohol/week: 2.5 - 3.3 standard drinks     Comment: Red wine     Family History   Problem Relation Age of Onset     Heart Disease Mother         cad at age 70s     Cancer Mother         KIDNEY     Diabetes Mother      Coronary Artery Disease Mother      Hypertension Mother      Heart Disease Father         chf     Neurologic Disorder Father         PARKINSONS     Alzheimer Disease Father      Heart Disease Maternal Grandmother         chf     Coronary Artery Disease Maternal Grandmother      Cancer Maternal Grandfather         ?     Heart Disease Paternal Grandmother      Coronary Artery Disease Paternal Grandmother      Heart Disease Paternal Grandfather       "Neurologic Disorder Paternal Grandfather         PARKINSONS     Blood Disease Brother         LEUKEMIA     Breast Cancer Other      Diabetes Other         Mother's sister's daughter     Cancer Daughter         CERVICAL     Cancer Other         THYROID     Cerebrovascular Disease Other      Breast Cancer Daughter      History   Drug Use Unknown         Objective     /74   Pulse 87   Temp 98.2  F (36.8  C) (Oral)   Ht 1.613 m (5' 3.5\")   Wt 58.5 kg (129 lb)   BMI 22.49 kg/m      Physical Exam    GENERAL APPEARANCE: healthy, alert and no distress     EYES: EOMI, PERRL     HENT: ear canals and TM's normal and nose and mouth without ulcers or lesions     NECK: no adenopathy, no asymmetry, masses, or scars and thyroid normal to palpation     RESP: lungs clear to auscultation - no rales, rhonchi or wheezes     CV: regular rates and rhythm, normal S1 S2, no S3 or S4 and no murmur, click or rub     ABDOMEN:  soft, nontender, no HSM or masses and bowel sounds normal     MS: extremities normal- no gross deformities noted, no evidence of inflammation in joints, FROM in all extremities.     SKIN: no suspicious lesions or rashes     NEURO: Normal strength and tone, sensory exam grossly normal, mentation intact and speech normal     PSYCH: mentation appears normal. and affect normal/bright     LYMPHATICS: No cervical adenopathy    Recent Labs   Lab Test 02/22/21  1107 01/26/21  1017 01/11/21  0936 11/11/20  0950   HGB 12.2 9.2*  --   --     427  --   --    NA  --   --  136 136   POTASSIUM  --   --  4.5 4.1   CR  --   --  0.48* 0.52        Diagnostics:  Labs pending at this time.  Results will be reviewed when available.   No EKG required for low risk surgery (cataract, skin procedure, breast biopsy, etc).    Revised Cardiac Risk Index (RCRI):  The patient has the following serious cardiovascular risks for perioperative complications:   - No serious cardiac risks = 0 points     RCRI Interpretation: 0 points: Class " I (very low risk - 0.4% complication rate)           Signed Electronically by: Lani Valencia MD  Copy of this evaluation report is provided to requesting physician.

## 2021-06-03 NOTE — PROGRESS NOTES
[facility]  [address] [facility] [address]  Phone: [phone]  Primary Provider: Lani Valencia  {FV AMB Performing Provider (Optional):508034}    {Provider  Link to PREOP SmartSet  Use this to apply standard patient instructions to AVS; includes medication directions, common orders, guidelines for anemia, warfarin, additional testing   :513084}  PREOPERATIVE EVALUATION:  Today's date: 6/3/2021    Kate Isaac is a 85 year old female who presents for a preoperative evaluation.    Surgical Information:  Surgery/Procedure: cataract  Surgery Location: [facility]   Surgeon: Astrid   Surgery Date: June 18 and June 25  Time of Surgery: TBD  Where patient plans to recover: {Preop post recovery plans :207521}  Fax number for surgical facility: [phone]    Type of Anesthesia Anticipated: {ANESTHESIA:109654}    {2021 Provider Charting Preference for Preop :804683}    Subjective     HPI related to upcoming procedure: ***      Preop Questions 1/9/2021   1. Have you ever had a heart attack or stroke? No   2. Have you ever had surgery on your heart or blood vessels, such as a stent placement, a coronary artery bypass, or surgery on an artery in your head, neck, heart, or legs? No   3. Do you have chest pain with activity? No   4. Do you have a history of  heart failure? No   5. Do you currently have a cold, bronchitis or symptoms of other infection? No   6. Do you have a cough, shortness of breath, or wheezing? No   7. Do you or anyone in your family have previous history of blood clots? No   8. Do you or does anyone in your family have a serious bleeding problem such as prolonged bleeding following surgeries or cuts? No   9. Have you ever had problems with anemia or been told to take iron pills? No   10. Have you had any abnormal blood loss such as black, tarry or bloody stools, or abnormal vaginal bleeding? No   11. Have you ever had a blood  transfusion? No   12. Are you willing to have a blood transfusion if it is medically needed before, during, or after your surgery? Yes   13. Have you or any of your relatives ever had problems with anesthesia? No   14. Do you have sleep apnea, excessive snoring or daytime drowsiness? No   15. Do you have any artifical heart valves or other implanted medical devices like a pacemaker, defibrillator, or continuous glucose monitor? No   16. Do you have artificial joints? YES - ***   17. Are you allergic to latex? No     Health Care Directive:  Patient has a Health Care Directive on file      Preoperative Review of :  {Mnpmpreport:531933}  {Review MNPMP for all patients per ICSI MNPMP Profile:894540}    {Chronic problem details (Optional) :832718}    Review of Systems  {ROS Preop Choices:328322}    Patient Active Problem List    Diagnosis Date Noted     Essential hypertension with goal blood pressure less than 140/90 05/17/2016     Priority: Medium     Female stress incontinence 07/22/2013     Priority: Medium     Urge incontinence 07/30/2012     Priority: Medium     Advance Care Planning 07/06/2011     Priority: Medium     Advance Care Planning: Receipt of ACP document:  Received: Health Care Directive which was witnessed or notarized on 9-10-93.  Document previously scanned on 12-9-13.  Validation form completed and sent to be scanned.  Code Status needs to be updated to reflect choices in most recent ACP document..  Confirmed/documented designated decision maker(s). See permanent comments section of demographics in clinical tab. View document(s) and details by clicking on code status. Added by Yesenia Oro RN, System ACP Coordinator on 3/28/2014.             Seasonal allergies 07/06/2011     Priority: Medium     CARDIOVASCULAR SCREENING; LDL GOAL LESS THAN 130 07/06/2011     Priority: Medium     Osteopenia      Priority: Medium     Recurrent UTI      Priority: Medium     Breast cancer (H)      Priority: Medium      invasive ductal       Malignant basal cell neoplasm of skin      Priority: Medium     sees dermatologist  Do you wish to do the replacement in the background? yes          Past Medical History:   Diagnosis Date     Arthritis 2014    Per pt had it since FEB     Basal cell cancer     sees dermatologist     Breast cancer (H)     invasive ductal     HTN      Osteopenia      Recurrent UTI      Past Surgical History:   Procedure Laterality Date     BIOPSY  1996,    basal cell cancers     BUNIONECTOMY RT/LT       CL AFF SURGICAL PATHOLOGY      ganglion cyst removal     COLONOSCOPY       GENITOURINARY SURGERY       HC EXCISION BREAST LESION, OPEN >=1      right breast     SLING TRANSVAGINAL  2013    Procedure: SLING TRANSVAGINAL;  Cysto with TVT;  Surgeon: Denia Shultz MD;  Location: [facility] OR     SURGICAL HISTORY OF -       right thigh tumor removal/benign     Current Outpatient Medications   Medication Sig Dispense Refill     amLODIPine (NORVASC) 5 MG tablet TAKE 1 TABLET EVERY DAY 90 tablet 1     calcium carb 1250 mg, 500 mg [place],/vitamin D 200 units (OSCAL WITH D) 500-200 MG-UNIT per tablet Take 1 tablet by mouth 2 times daily (with meals)       FIBER PO Take 2 capsules by mouth At Bedtime        Flaxseed, Linseed, (FLAX PO)        GLUCOSAMINE CHONDROITIN OR TABS 1 twice daily       losartan (COZAAR) 50 MG tablet TAKE 1 TABLET EVERY DAY 90 tablet 1     Multiple Vitamins-Minerals (PRESERVISION AREDS 2) CAPS One in AM and one in PM         Allergies   Allergen Reactions     Ceftriaxone Swelling     Sulfa Drugs Rash     Lisinopril Fatigue        Social History     Tobacco Use     Smoking status: Former Smoker     Packs/day: 1.00     Years: 32.00     Pack years: 32.00     Types: Cigarettes     Start date: 1956     Quit date: 1985     Years since quittin.4     Smokeless tobacco: Never Used     Tobacco comment: I stopped smoking several times from  to    Substance Use  "Topics     Alcohol use: Yes     Alcohol/week: 2.5 - 3.3 standard drinks     Comment: Red wine     {FAMILY HISTORY (Optional):549497281}  History   Drug Use Unknown         Objective     BP (!) 167/77   Pulse 87   Temp 98.2  F (36.8  C) (Oral)   Ht 1.613 m (5' 3.5\")   Wt 58.5 kg (129 lb)   BMI 22.49 kg/m      Physical Exam  {EXAM Preop Choices:803812}    Recent Labs   Lab Test 21  1107 21  1017 21  0936 20  0950   HGB 12.2 9.2*  --   --     427  --   --    NA  --   --  136 136   POTASSIUM  --   --  4.5 4.1   CR  --   --  0.48* 0.52        Diagnostics:  {LABS:484131}   {EK}    Revised Cardiac Risk Index (RCRI):  The patient has the following serious cardiovascular risks for perioperative complications:  {PREOP REVISED CARDIAC RISK INDEX (RCRI) :027153::\" - No serious cardiac risks = 0 points\"}     RCRI Interpretation: {REVISED CARDIAC RISK INTERPRETATION :823873}         Signed Electronically by: Lani Valencia MD  Copy of this evaluation report is provided to requesting physician.    {Provider Resources  Preop [facility] Preop Guidelines  Revised Cardiac Risk Index :367204}  "

## 2021-08-02 ENCOUNTER — TRANSFERRED RECORDS (OUTPATIENT)
Dept: HEALTH INFORMATION MANAGEMENT | Facility: CLINIC | Age: 86
End: 2021-08-02

## 2021-09-04 ENCOUNTER — HEALTH MAINTENANCE LETTER (OUTPATIENT)
Age: 86
End: 2021-09-04

## 2022-01-25 ENCOUNTER — MYC MEDICAL ADVICE (OUTPATIENT)
Dept: FAMILY MEDICINE | Facility: CLINIC | Age: 87
End: 2022-01-25
Payer: COMMERCIAL

## 2022-01-25 NOTE — TELEPHONE ENCOUNTER
Last visit with PCP was 6/2021 for a pre-op.   When do you want to see her next?  Looks like Medicare Annual Wellness exam is due in June.    Massiel JUAREZN, RN

## 2022-06-27 ENCOUNTER — OFFICE VISIT (OUTPATIENT)
Dept: UROLOGY | Facility: CLINIC | Age: 87
End: 2022-06-27
Payer: COMMERCIAL

## 2022-06-27 DIAGNOSIS — N95.2 ATROPHIC VAGINITIS: ICD-10-CM

## 2022-06-27 DIAGNOSIS — N39.41 URGE INCONTINENCE: Primary | ICD-10-CM

## 2022-06-27 PROCEDURE — 99205 OFFICE O/P NEW HI 60 MIN: CPT | Performed by: UROLOGY

## 2022-06-27 RX ORDER — ESTRADIOL 0.1 MG/G
2 CREAM VAGINAL
Qty: 42.5 G | Refills: 11 | Status: SHIPPED | OUTPATIENT
Start: 2022-06-27 | End: 2023-09-26

## 2022-06-27 NOTE — PROGRESS NOTES
HPI:  Kate Isaac is a 86 year old female with some urinary urge incontinence that has occurred a couple of times.  She is not on any HRT.  She tried Trospium and detrol as well as Ditropan and Myrbetriq in the past.  She then had a midurethral sling with miniarc precise on 12/9/2013 and has been doing well from that standpoint.    Reviewed previous notes from me on 5/20/2013 and  Op note from me on 12/9/2013.    Exam:  There were no vitals taken for this visit.  GENERAL: Healthy, alert and no distress  EYES: Eyes grossly normal to inspection.  No discharge or erythema, or obvious scleral/conjunctival abnormalities.  RESP: No audible wheeze, cough, or visible cyanosis.  No visible retractions or increased work of breathing.    SKIN: Visible skin clear. No significant rash, abnormal pigmentation or lesions.  NEURO: Cranial nerves grossly intact.  Mentation and speech appropriate for age.  PSYCH: Mentation appears normal, affect normal/bright, judgement and insight intact, normal speech and appearance well-groomed.      Review of Labs:  The following labs were reviewed by me and discussed with the patient:  Lab Results   Component Value Date    WBC 7.8 02/22/2021     Lab Results   Component Value Date    RBC 4.32 02/22/2021     Lab Results   Component Value Date    HGB 12.2 02/22/2021     Lab Results   Component Value Date    HCT 39.4 02/22/2021     Lab Results   Component Value Date    MCV 91 02/22/2021     Lab Results   Component Value Date    MCH 28.2 02/22/2021     Lab Results   Component Value Date    MCHC 31.0 02/22/2021     Lab Results   Component Value Date    RDW 14.3 02/22/2021     Lab Results   Component Value Date     02/22/2021        Last Comprehensive Metabolic Panel:  Sodium   Date Value Ref Range Status   06/03/2021 139 133 - 144 mmol/L Final     Potassium   Date Value Ref Range Status   06/03/2021 4.6 3.4 - 5.3 mmol/L Final     Chloride   Date Value Ref Range Status   06/03/2021 105 94 -  109 mmol/L Final     Carbon Dioxide   Date Value Ref Range Status   06/03/2021 28 20 - 32 mmol/L Final     Anion Gap   Date Value Ref Range Status   06/03/2021 6 3 - 14 mmol/L Final     Glucose   Date Value Ref Range Status   06/03/2021 105 (H) 70 - 99 mg/dL Final     Comment:     Fasting specimen     Urea Nitrogen   Date Value Ref Range Status   06/03/2021 12 7 - 30 mg/dL Final     Creatinine   Date Value Ref Range Status   06/03/2021 0.54 0.52 - 1.04 mg/dL Final     GFR Estimate   Date Value Ref Range Status   06/03/2021 87 >60 mL/min/[1.73_m2] Final     Comment:     Non  GFR Calc  Starting 12/18/2018, serum creatinine based estimated GFR (eGFR) will be   calculated using the Chronic Kidney Disease Epidemiology Collaboration   (CKD-EPI) equation.       Calcium   Date Value Ref Range Status   06/03/2021 9.1 8.5 - 10.1 mg/dL Final     Bilirubin Total   Date Value Ref Range Status   09/16/2020 0.9 0.2 - 1.3 mg/dL Final     Alkaline Phosphatase   Date Value Ref Range Status   09/16/2020 83 40 - 150 U/L Final     ALT   Date Value Ref Range Status   09/16/2020 19 0 - 50 U/L Final     AST   Date Value Ref Range Status   09/16/2020 18 0 - 45 U/L Final                Color Urine (no units)   Date Value   07/28/2017 Yellow     Appearance Urine (no units)   Date Value   07/28/2017 Slightly Cloudy     Glucose Urine (mg/dL)   Date Value   07/28/2017 Negative     Bilirubin Urine (no units)   Date Value   07/28/2017 Negative     Ketones Urine (mg/dL)   Date Value   07/28/2017 Negative     Specific Gravity Urine (no units)   Date Value   07/28/2017 <=1.005     pH Urine (pH)   Date Value   07/28/2017 7.0     Protein Albumin Urine (mg/dL)   Date Value   07/28/2017 Negative     Urobilinogen Urine (EU/dL)   Date Value   07/28/2017 0.2     Nitrite Urine (no units)   Date Value   07/28/2017 Negative     Leukocyte Esterase Urine (no units)   Date Value   07/28/2017 Large (A)          Assessment & Plan   85 y/o female  with mild urgency and intermittent urge incontinence.  She has nocturia X 2 as well as atrophic vaginitis with hx of stress incontinence treated with sling in 2013 doing well from that standpoint.  We discussed some estrogen cream.  She has hx of breast cancer in 1996 and is s/p lumpectomy and XRT but has not had any recurrence.  She is interested in the estrogen cream.  -rx for estrogen cream, small amount to use twice per week.  -f/u in 3 months, if still having issues then consider cystoscopy for further evaluation.    Denia Shultz MD  [facility]      ==========================      Additional Coding Information:    Time spent:  67 minutes spent on the date of the encounter doing chart review, history and exam, documentation and further activities per the note

## 2022-06-27 NOTE — PROGRESS NOTES
Kate Isaac's goals for this visit include:   Chief Complaint   Patient presents with     New Patient     Urgency to urinate. Hx sling surgery 2013        She requests these members of her care team be copied on today's visit information:     PCP: Lani Valencia    Referring Provider:  No referring provider defined for this encounter.    There were no vitals taken for this visit.    Do you need any medication refills at today's visit?     PVR: 66    Tesha Balbuena LPN on 6/27/2022 at 11:29 AM

## 2022-06-27 NOTE — PATIENT INSTRUCTIONS
-rx for estrogen cream, small amount to use twice per week.  -f/u in 3 months, if still having issues then consider cystoscopy for further evaluation.

## 2022-07-04 ASSESSMENT — ENCOUNTER SYMPTOMS
FREQUENCY: 0
BREAST MASS: 0
ABDOMINAL PAIN: 0
CONSTIPATION: 0
HEARTBURN: 0
MYALGIAS: 0
SHORTNESS OF BREATH: 0
COUGH: 0
DIZZINESS: 0
HEADACHES: 0
ARTHRALGIAS: 0
EYE PAIN: 0
SORE THROAT: 0
PARESTHESIAS: 0
CHILLS: 0
PALPITATIONS: 0
WEAKNESS: 0
FEVER: 0
NERVOUS/ANXIOUS: 0
DYSURIA: 0
DIARRHEA: 0
HEMATURIA: 0
NAUSEA: 0
JOINT SWELLING: 0
HEMATOCHEZIA: 0

## 2022-07-04 ASSESSMENT — ACTIVITIES OF DAILY LIVING (ADL): CURRENT_FUNCTION: NO ASSISTANCE NEEDED

## 2022-07-05 NOTE — PROGRESS NOTES
"  SUBJECTIVE:   Kate Isaac is a 86 year old female who presents for Preventive Visit.    {Split Bill scripting  The purpose of this visit is to discuss your medical history and prevent health problems before you are sick. You may be responsible for a co-pay, coinsurance, or deductible if your visit today includes services such as checking on a sore throat, having an x-ray or lab test, or treating and evaluating a new or existing condition :165747}  {Patient advised of split billing (Optional):506412}  Are you in the first 12 months of your Medicare Part B coverage?  { :932119::\"No\"}    Physical Health:    In general, how would you rate your overall physical health? { :031613}    Outside of work, how many days during the week do you exercise? { :086232}    Outside of work, approximately how many minutes a day do you exercise?{ :969839}    If you drink alcohol do you typically have >3 drinks per day or >7 drinks per week? { :770075}    Do you usually eat at least 4 servings of fruit and vegetables a day, include whole grains & fiber and avoid regularly eating high fat or \"junk\" foods? { :425787::\"Yes\"}    Do you have any problems taking medications regularly?  { :580528::\"No\"}    Do you have any side effects from medications? { :223525}    Needs assistance for the following daily activities: { :412217}    Which of the following safety concerns are present in your home?  { :405560::\"none identified\"}     Hearing impairment: { :454018}    In the past 6 months, have you been bothered by leaking of urine? { :686508}    Mental Health:    In general, how would you rate your overall mental or emotional health? { :026467}  PHQ-2 Score: 0    Do you feel safe in your environment? { :863762}    Have you ever done Advance Care Planning? (For example, a Health Directive, POLST, or a discussion with a medical provider or your loved ones about your wishes): { :922048}    Additional concerns to address?  {If YES, notify " "patient they may be responsible for a copay, coinsurance or deductible if the visit today includes services such as checking on a sore throat, having an x-ray or lab test, or treating and evaluating a new or existing condition :326474::\"No\"}    Fall risk:  { :210891}  {If any of the above assessments are answered yes, consider ordering appropriate referrals (Optional):690295::\"click delete button to remove this line now\"}  Cognitive Screening: { :241721}    {Do you have sleep apnea, excessive snoring or daytime drowsiness? (Optional):591895}    {Outside tests to abstract? :454530}    {additional problems to add (Optional):450075}    Reviewed and updated as needed this visit by clinical staff                    Reviewed and updated as needed this visit by Provider                   Social History     Tobacco Use     Smoking status: Former Smoker     Packs/day: 1.00     Years: 32.00     Pack years: 32.00     Types: Cigarettes     Start date: 1956     Quit date: 1985     Years since quittin.5     Smokeless tobacco: Never Used     Tobacco comment: I stopped smoking several times from  to    Substance Use Topics     Alcohol use: Yes     Alcohol/week: 2.5 - 3.3 standard drinks     Comment: Red wine                           Current providers sharing in care for this patient include: {Rooming staff:  Please update Care Team in Rooming Activity, refresh this note and then delete this statement}  Patient Care Team:  Lani Valencia MD as PCP - General  Lani Valencia MD as Assigned PCP    The following health maintenance items are reviewed in Epic and correct as of today:  Health Maintenance   Topic Date Due     ANNUAL REVIEW OF HM ORDERS  Never done     COLORECTAL CANCER SCREENING  2018     DEXA  2021     MEDICARE ANNUAL WELLNESS VISIT  2022     INFLUENZA VACCINE (1) 2022     FALL RISK ASSESSMENT  2023     DTAP/TDAP/TD IMMUNIZATION (4 - Td or Tdap) " "2024     ADVANCE CARE PLANNING  2026     PHQ-2 (once per calendar year)  Completed     Pneumococcal Vaccine: 65+ Years  Completed     ZOSTER IMMUNIZATION  Completed     COVID-19 Vaccine  Completed     IPV IMMUNIZATION  Aged Out     MENINGITIS IMMUNIZATION  Aged Out     HEPATITIS B IMMUNIZATION  Aged Out     {Chronicprobdata (Optional):292464}  {Decision Support (Optional):673700}    ROS:  {ROS COMP:864496}    OBJECTIVE:   There were no vitals taken for this visit. Estimated body mass index is 22.49 kg/m  as calculated from the following:    Height as of 6/3/21: 1.613 m (5' 3.5\").    Weight as of 6/3/21: 58.5 kg (129 lb).  EXAM:   {Exam :951956}    {Diagnostic Test Results (Optional):455713::\"Diagnostic Test Results:\",\"Labs reviewed in Epic\"}    ASSESSMENT / PLAN:   {Diag Picklist:493123}    {Patient advised of split billing (Optional):392463}    COUNSELING:  {Medicare Counselin}    Estimated body mass index is 22.49 kg/m  as calculated from the following:    Height as of 6/3/21: 1.613 m (5' 3.5\").    Weight as of 6/3/21: 58.5 kg (129 lb).    {Weight Management Plan (ACO) Complete if BMI is abnormal-  Ages 18-64  BMI >24.9.  Age 65+ with BMI <23 or >30 (Optional):248351}    She reports that she quit smoking about 37 years ago. Her smoking use included cigarettes. She started smoking about 66 years ago. She has a 32.00 pack-year smoking history. She has never used smokeless tobacco.    Appropriate preventive services were discussed with this patient, including applicable screening as appropriate for cardiovascular disease, diabetes, osteopenia/osteoporosis, and glaucoma.  As appropriate for age/gender, discussed screening for colorectal cancer, prostate cancer, breast cancer, and cervical cancer. Checklist reviewing preventive services available has been given to the patient.    Reviewed patients plan of care and provided an AVS. The {CarePlan:488798} for Kate meets the Care Plan requirement. This " Care Plan has been established and reviewed with the {PATIENT, FAMILY MEMBER, CAREGIVER:245618}.    Counseling Resources:  ATP IV Guidelines  Pooled Cohorts Equation Calculator  Breast Cancer Risk Calculator  BRCA-Related Cancer Risk Assessment: FHS-7 Tool  FRAX Risk Assessment  ICSI Preventive Guidelines  Dietary Guidelines for Americans, 2010  USDA's MyPlate  ASA Prophylaxis  Lung CA Screening    Lani Valencia MD  [facility]

## 2022-07-05 NOTE — PATIENT INSTRUCTIONS
Patient Education   Personalized Prevention Plan  You are due for the preventive services outlined below.  Your care team is available to assist you in scheduling these services.  If you have already completed any of these items, please share that information with your care team to update in your medical record.  Health Maintenance Due   Topic Date Due     ANNUAL REVIEW OF HM ORDERS  Never done     Colorectal Cancer Screening  09/16/2018     Osteoporosis Screening  12/20/2021

## 2022-07-06 ENCOUNTER — OFFICE VISIT (OUTPATIENT)
Dept: FAMILY MEDICINE | Facility: CLINIC | Age: 87
End: 2022-07-06
Payer: COMMERCIAL

## 2022-07-06 VITALS
HEART RATE: 89 BPM | BODY MASS INDEX: 23.21 KG/M2 | SYSTOLIC BLOOD PRESSURE: 138 MMHG | DIASTOLIC BLOOD PRESSURE: 80 MMHG | HEIGHT: 63 IN | WEIGHT: 131 LBS | OXYGEN SATURATION: 99 % | RESPIRATION RATE: 17 BRPM | TEMPERATURE: 99 F

## 2022-07-06 DIAGNOSIS — Z96.653 STATUS POST BILATERAL KNEE REPLACEMENTS: ICD-10-CM

## 2022-07-06 DIAGNOSIS — C50.919 MALIGNANT NEOPLASM OF FEMALE BREAST, UNSPECIFIED ESTROGEN RECEPTOR STATUS, UNSPECIFIED LATERALITY, UNSPECIFIED SITE OF BREAST (H): ICD-10-CM

## 2022-07-06 DIAGNOSIS — Z00.00 ENCOUNTER FOR MEDICARE ANNUAL WELLNESS EXAM: Primary | ICD-10-CM

## 2022-07-06 DIAGNOSIS — I10 HYPERTENSION GOAL BP (BLOOD PRESSURE) < 140/90: ICD-10-CM

## 2022-07-06 LAB
ANION GAP SERPL CALCULATED.3IONS-SCNC: 6 MMOL/L (ref 3–14)
BUN SERPL-MCNC: 14 MG/DL (ref 7–30)
CALCIUM SERPL-MCNC: 9.8 MG/DL (ref 8.5–10.1)
CHLORIDE BLD-SCNC: 103 MMOL/L (ref 94–109)
CHOLEST SERPL-MCNC: 216 MG/DL
CO2 SERPL-SCNC: 28 MMOL/L (ref 20–32)
CREAT SERPL-MCNC: 0.51 MG/DL (ref 0.52–1.04)
FASTING STATUS PATIENT QL REPORTED: NO
GFR SERPL CREATININE-BSD FRML MDRD: >90 ML/MIN/1.73M2
GLUCOSE BLD-MCNC: 97 MG/DL (ref 70–99)
HDLC SERPL-MCNC: 93 MG/DL
LDLC SERPL CALC-MCNC: 108 MG/DL
NONHDLC SERPL-MCNC: 123 MG/DL
POTASSIUM BLD-SCNC: 4.7 MMOL/L (ref 3.4–5.3)
SODIUM SERPL-SCNC: 137 MMOL/L (ref 133–144)
TRIGL SERPL-MCNC: 77 MG/DL

## 2022-07-06 PROCEDURE — 99213 OFFICE O/P EST LOW 20 MIN: CPT | Mod: 25 | Performed by: FAMILY MEDICINE

## 2022-07-06 PROCEDURE — 36415 COLL VENOUS BLD VENIPUNCTURE: CPT | Performed by: FAMILY MEDICINE

## 2022-07-06 PROCEDURE — 99397 PER PM REEVAL EST PAT 65+ YR: CPT | Performed by: FAMILY MEDICINE

## 2022-07-06 PROCEDURE — 80048 BASIC METABOLIC PNL TOTAL CA: CPT | Performed by: FAMILY MEDICINE

## 2022-07-06 PROCEDURE — 80061 LIPID PANEL: CPT | Performed by: FAMILY MEDICINE

## 2022-07-06 RX ORDER — LOSARTAN POTASSIUM 50 MG/1
50 TABLET ORAL DAILY
Qty: 90 TABLET | Refills: 3 | Status: SHIPPED | OUTPATIENT
Start: 2022-07-06 | End: 2023-01-30

## 2022-07-06 RX ORDER — AMLODIPINE BESYLATE 5 MG/1
5 TABLET ORAL DAILY
Qty: 90 TABLET | Refills: 3 | Status: SHIPPED | OUTPATIENT
Start: 2022-07-06 | End: 2023-04-02

## 2022-07-06 RX ORDER — AMOXICILLIN 500 MG/1
2000 CAPSULE ORAL ONCE
Qty: 4 CAPSULE | Refills: 4 | Status: SHIPPED | OUTPATIENT
Start: 2022-07-06 | End: 2022-07-11

## 2022-07-06 ASSESSMENT — ENCOUNTER SYMPTOMS
FREQUENCY: 0
CONSTIPATION: 0
JOINT SWELLING: 0
CHILLS: 0
FEVER: 0
ABDOMINAL PAIN: 0
WEAKNESS: 0
PARESTHESIAS: 0
BREAST MASS: 0
DIZZINESS: 0
PALPITATIONS: 0
NAUSEA: 0
MYALGIAS: 0
SHORTNESS OF BREATH: 0
DYSURIA: 0
DIARRHEA: 0
HEMATOCHEZIA: 0
HEMATURIA: 0
NERVOUS/ANXIOUS: 0
ARTHRALGIAS: 0
COUGH: 0
HEADACHES: 0
SORE THROAT: 0
EYE PAIN: 0
HEARTBURN: 0

## 2022-07-06 ASSESSMENT — ACTIVITIES OF DAILY LIVING (ADL): CURRENT_FUNCTION: NO ASSISTANCE NEEDED

## 2022-07-06 ASSESSMENT — PAIN SCALES - GENERAL: PAINLEVEL: NO PAIN (0)

## 2022-07-06 NOTE — PROGRESS NOTES
"SUBJECTIVE:   Kate Isaac is a 86 year old female who presents for Preventive Visit.      Patient has been advised of split billing requirements and indicates understanding: Yes  Are you in the first 12 months of your Medicare coverage?  No    Healthy Habits:     In general, how would you rate your overall health?  Good    Frequency of exercise:  4-5 days/week    Duration of exercise:  30-45 minutes    Do you usually eat at least 4 servings of fruit and vegetables a day, include whole grains    & fiber and avoid regularly eating high fat or \"junk\" foods?  Yes    Medication side effects:  None    Ability to successfully perform activities of daily living:  No assistance needed    Home Safety:  No safety concerns identified    Hearing Impairment:  Difficulty following a conversation in a noisy restaurant or crowded room and difficulty understanding soft or whispered speech    In the past 6 months, have you been bothered by leaking of urine? Yes    In general, how would you rate your overall mental or emotional health?  Good      PHQ-2 Total Score: 0    Additional concerns today:  Yes  would she benefit form taking b 12?  Has Rx for amoxil prior to dental appointments. Who should prescribe that?    Right ear had wax please check    Follow up from test with home insurance on veins of right leg  ? Abnormal HUSAM. Pt is asymptomatic  Pt could walk a mile without difficulty  Walks on treadmill for 50 minutes  Walks 9 holes on golf course    No wax in right ear    Needs dental prophylaxis forever per ortho. Pt called and asked      Do you feel safe in your environment? YES    Have you ever done Advance Care Planning? (For example, a Health Directive, POLST, or a discussion with a medical provider or your loved ones about your wishes): Yes, advance care planning is on file.       Fall risk  Fallen 2 or more times in the past year?: No  Any fall with injury in the past year?: No    Cognitive Screening   1) Repeat 3 items " (Leader, Season, Table)    2) Clock draw: NORMAL  3) 3 item recall: Recalls 2 objects   Results: NORMAL clock, 1-2 items recalled: COGNITIVE IMPAIRMENT LESS LIKELY    Mini-CogTM Copyright S Georgie. Licensed by the author for use in [facility]; reprinted with permission (dipti@[facility]). All rights reserved.      Do you have sleep apnea, excessive snoring or daytime drowsiness?: no    Reviewed and updated as needed this visit by clinical staff   Tobacco  Allergies  Meds                Reviewed and updated as needed this visit by Provider                   Social History     Tobacco Use     Smoking status: Former Smoker     Packs/day: 1.00     Years: 32.00     Pack years: 32.00     Types: Cigarettes     Start date: 1956     Quit date: 1985     Years since quittin.5     Smokeless tobacco: Never Used     Tobacco comment: I stopped smoking several times from  to    Substance Use Topics     Alcohol use: Yes     Alcohol/week: 2.5 - 3.3 standard drinks     Comment: Red wine     If you drink alcohol do you typically have >3 drinks per day or >7 drinks per week? No    Alcohol Use 2022   Prescreen: >3 drinks/day or >7 drinks/week? No   Prescreen: >3 drinks/day or >7 drinks/week? -               Current providers sharing in care for this patient include:   Patient Care Team:  Lani Valencia MD as PCP - General  Lani Valencia MD as Assigned PCP    The following health maintenance items are reviewed in Epic and correct as of today:  Health Maintenance Due   Topic Date Due     ANNUAL REVIEW OF  ORDERS  Never done     COLORECTAL CANCER SCREENING  2018     DEXA  2021     MEDICARE ANNUAL WELLNESS VISIT  2022     Lab work is in process      Mammogram Screening - Patient over age 75, has elected to continue with screening.  Pertinent mammograms are reviewed under the imaging tab.    Review of Systems   Constitutional: Negative for chills and fever.   HENT:  "Negative for congestion, ear pain, hearing loss and sore throat.    Eyes: Negative for pain and visual disturbance.   Respiratory: Negative for cough and shortness of breath.    Cardiovascular: Negative for chest pain, palpitations and peripheral edema.   Gastrointestinal: Negative for abdominal pain, constipation, diarrhea, heartburn, hematochezia and nausea.   Breasts:  Negative for tenderness, breast mass and discharge.   Genitourinary: Negative for dysuria, frequency, genital sores, hematuria, pelvic pain, urgency, vaginal bleeding and vaginal discharge.   Musculoskeletal: Negative for arthralgias, joint swelling and myalgias.   Skin: Negative for rash.   Neurological: Negative for dizziness, weakness, headaches and paresthesias.   Psychiatric/Behavioral: Negative for mood changes. The patient is not nervous/anxious.      Constitutional, HEENT, cardiovascular, pulmonary, gi and gu systems are negative, except as otherwise noted.    OBJECTIVE:   BP (!) 151/84   Pulse 89   Temp 99  F (37.2  C) (Oral)   Resp 17   Ht 1.6 m (5' 3\")   Wt 59.4 kg (131 lb)   SpO2 99%   BMI 23.21 kg/m   Estimated body mass index is 23.21 kg/m  as calculated from the following:    Height as of this encounter: 1.6 m (5' 3\").    Weight as of this encounter: 59.4 kg (131 lb).  Physical Exam  GENERAL: healthy, alert and no distress  EYES: Eyes grossly normal to inspection, PERRL and conjunctivae and sclerae normal  HENT: ear canals and TM's normal, nose and mouth without ulcers or lesions  NECK: no adenopathy, no asymmetry, masses, or scars and thyroid normal to palpation  RESP: lungs clear to auscultation - no rales, rhonchi or wheezes  CV: regular rate and rhythm, normal S1 S2, no S3 or S4, no murmur, click or rub, no peripheral edema and peripheral pulses strong  ABDOMEN: soft, nontender, no hepatosplenomegaly, no masses and bowel sounds normal  MS: no gross musculoskeletal defects noted, no edema  SKIN: no suspicious lesions or " "rashes  NEURO: Normal strength and tone, mentation intact and speech normal  PSYCH: mentation appears normal, affect normal/bright    Diagnostic Test Results:  Labs reviewed in Epic    ASSESSMENT / PLAN:   (Z00.00) Encounter for Medicare annual wellness exam  (primary encounter diagnosis)  Comment:   Plan:     (I10) Hypertension goal BP (blood pressure) < 140/90  Comment: at goal on meds  Plan: amLODIPine (NORVASC) 5 MG tablet, losartan         (COZAAR) 50 MG tablet, Lipid panel reflex to         direct LDL Non-fasting, **Basic metabolic panel        FUTURE 2mo            (C50.919) Malignant neoplasm of female breast, unspecified estrogen receptor status, unspecified laterality, unspecified site of breast (H)  Comment: getting yearly mammograms  Plan:     (Z96.713) Status post bilateral knee replacements  Comment: needs dental prophylaxis per pt  Plan: amoxicillin (AMOXIL) 500 MG capsule                Patient has been advised of split billing requirements and indicates understanding: Yes    COUNSELING:  Reviewed preventive health counseling, as reflected in patient instructions       Regular exercise       Healthy diet/nutrition       Vision screening       Hearing screening       Dental care       Colon cancer screening    Estimated body mass index is 23.21 kg/m  as calculated from the following:    Height as of this encounter: 1.6 m (5' 3\").    Weight as of this encounter: 59.4 kg (131 lb).        She reports that she quit smoking about 37 years ago. Her smoking use included cigarettes. She started smoking about 66 years ago. She has a 32.00 pack-year smoking history. She has never used smokeless tobacco.      Appropriate preventive services were discussed with this patient, including applicable screening as appropriate for cardiovascular disease, diabetes, osteopenia/osteoporosis, and glaucoma.  As appropriate for age/gender, discussed screening for colorectal cancer, prostate cancer, breast cancer, and cervical " cancer. Checklist reviewing preventive services available has been given to the patient.    Reviewed patients plan of care and provided an AVS. The Intermediate Care Plan ( asthma action plan, low back pain action plan, and migraine action plan) for Kate meets the Care Plan requirement. This Care Plan has been established and reviewed with the Patient.    Counseling Resources:  ATP IV Guidelines  Pooled Cohorts Equation Calculator  Breast Cancer Risk Calculator  Breast Cancer: Medication to Reduce Risk  FRAX Risk Assessment  ICSI Preventive Guidelines  Dietary Guidelines for Americans, 2010  Campanja's MyPlate  ASA Prophylaxis  Lung CA Screening    Lani Valencia MD  [facility]    Identified Health Risks:

## 2022-07-11 ENCOUNTER — MYC MEDICAL ADVICE (OUTPATIENT)
Dept: FAMILY MEDICINE | Facility: CLINIC | Age: 87
End: 2022-07-11

## 2022-07-11 DIAGNOSIS — Z96.653 STATUS POST BILATERAL KNEE REPLACEMENTS: ICD-10-CM

## 2022-07-11 RX ORDER — AMOXICILLIN 500 MG/1
2000 CAPSULE ORAL ONCE
Qty: 4 CAPSULE | Refills: 4 | Status: SHIPPED | OUTPATIENT
Start: 2022-07-11 | End: 2022-07-11

## 2022-07-11 NOTE — TELEPHONE ENCOUNTER
Routing refill request to provider for review/approval because:  Drug interaction warning    Ramona Villarreal RN

## 2022-08-05 ENCOUNTER — TRANSFERRED RECORDS (OUTPATIENT)
Dept: HEALTH INFORMATION MANAGEMENT | Facility: CLINIC | Age: 87
End: 2022-08-05

## 2022-08-08 ENCOUNTER — MYC MEDICAL ADVICE (OUTPATIENT)
Dept: FAMILY MEDICINE | Facility: CLINIC | Age: 87
End: 2022-08-08

## 2022-09-26 ENCOUNTER — MYC MEDICAL ADVICE (OUTPATIENT)
Dept: FAMILY MEDICINE | Facility: CLINIC | Age: 87
End: 2022-09-26

## 2022-09-26 DIAGNOSIS — M79.642 BILATERAL HAND PAIN: Primary | ICD-10-CM

## 2022-09-26 DIAGNOSIS — M79.641 BILATERAL HAND PAIN: Primary | ICD-10-CM

## 2022-10-22 ENCOUNTER — HEALTH MAINTENANCE LETTER (OUTPATIENT)
Age: 87
End: 2022-10-22

## 2022-10-24 ENCOUNTER — OFFICE VISIT (OUTPATIENT)
Dept: UROLOGY | Facility: CLINIC | Age: 87
End: 2022-10-24
Payer: COMMERCIAL

## 2022-10-24 DIAGNOSIS — R39.15 URINARY URGENCY: Primary | ICD-10-CM

## 2022-10-24 DIAGNOSIS — N95.2 ATROPHIC VAGINITIS: ICD-10-CM

## 2022-10-24 PROCEDURE — 99213 OFFICE O/P EST LOW 20 MIN: CPT | Performed by: UROLOGY

## 2022-10-24 ASSESSMENT — PAIN SCALES - GENERAL: PAINLEVEL: NO PAIN (0)

## 2022-10-24 NOTE — PROGRESS NOTES
Reason for Visit:  F/u on urinary symptoms.    Clinical Data:  Ms Isaac is a 86 year old female with some urinary urge incontinence that has occurred a couple of times. She was started on estrogen cream and is doing better by waiting when she feels the urge and she has not been having any leakage. She tried Trospium and detrol as well as Ditropan and Myrbetriq in the past.  She then had a midurethral sling with miniarc precise on 12/9/2013 and has been doing well from that standpoint.     Assessment & Plan   87 y/o female with mild urgency and intermittent urge incontinence that is much improved with estrogen cream and behavioral changes.  She has hx of breast cancer in 1996 and is s/p lumpectomy and XRT but has not had any recurrence.  She is interested in the estrogen cream.  -continue estrogen cream, small amount to use twice per week for the atrophic vaginitis.  It may be refilled by primary MD.  -f/u prgifty Shultz MD  [facility]      ==========================      Additional Coding Information:    Time spent:  24 minutes spent on the date of the encounter doing chart review, history and exam, documentation and further activities per the note

## 2022-10-24 NOTE — NURSING NOTE
Kate Isaac's chief complaint for this visit includes:  Chief Complaint   Patient presents with     Urinary Frequency     Urgency to urinate. Having a root canal (on penicillin). All other meds on the list right now are up to date.     PCP: Lani Valencia    Referring Provider:  No referring provider defined for this encounter.    There were no vitals taken for this visit.  No Pain (0)        Allergies   Allergen Reactions     Ceftriaxone Swelling     Sulfa Drugs Rash     Lisinopril Fatigue         Do you need any medication refills at today's visit? Mounika Bentley  In Clinic Visit Facilitator

## 2022-10-24 NOTE — PATIENT INSTRUCTIONS
-continue estrogen cream, small amount to use twice per week for the atrophic vaginitis.  It may be refilled by primary MD.  -f/u prn

## 2023-01-27 NOTE — TELEPHONE ENCOUNTER
DIAGNOSIS: MYLA hand pain   APPOINTMENT DATE: 02/07/2023   NOTES STATUS DETAILS   OFFICE NOTE from referring provider Internal [facility]/26/2022 Dr [facility]    OFFICE NOTE from other specialist N/A    DISCHARGE SUMMARY from hospital N/A    DISCHARGE REPORT from the ER N/A    OPERATIVE REPORT N/A    EMG report N/A    MEDICATION LIST N/A    MRI N/A    DEXA (osteoporosis/bone health) N/A    CT SCAN N/A    XRAYS (IMAGES & REPORTS) N/A

## 2023-01-30 DIAGNOSIS — I10 HYPERTENSION GOAL BP (BLOOD PRESSURE) < 140/90: ICD-10-CM

## 2023-01-30 RX ORDER — LOSARTAN POTASSIUM 50 MG/1
50 TABLET ORAL DAILY
Qty: 90 TABLET | Refills: 1 | Status: SHIPPED | OUTPATIENT
Start: 2023-01-30 | End: 2023-04-23

## 2023-02-07 ENCOUNTER — OFFICE VISIT (OUTPATIENT)
Dept: ORTHOPEDICS | Facility: CLINIC | Age: 88
End: 2023-02-07
Attending: FAMILY MEDICINE
Payer: COMMERCIAL

## 2023-02-07 ENCOUNTER — PRE VISIT (OUTPATIENT)
Dept: ORTHOPEDICS | Facility: CLINIC | Age: 88
End: 2023-02-07

## 2023-02-07 ENCOUNTER — ANCILLARY PROCEDURE (OUTPATIENT)
Dept: GENERAL RADIOLOGY | Facility: CLINIC | Age: 88
End: 2023-02-07
Attending: FAMILY MEDICINE
Payer: COMMERCIAL

## 2023-02-07 DIAGNOSIS — M79.642 BILATERAL HAND PAIN: ICD-10-CM

## 2023-02-07 DIAGNOSIS — M79.641 BILATERAL HAND PAIN: ICD-10-CM

## 2023-02-07 PROCEDURE — 99204 OFFICE O/P NEW MOD 45 MIN: CPT | Performed by: FAMILY MEDICINE

## 2023-02-07 PROCEDURE — 73130 X-RAY EXAM OF HAND: CPT | Mod: RT | Performed by: RADIOLOGY

## 2023-02-07 ASSESSMENT — PAIN SCALES - GENERAL: PAINLEVEL: MODERATE PAIN (4)

## 2023-02-07 NOTE — NURSING NOTE
Chief Complaint   Patient presents with     Right Hand - Pain, New Patient     Left Hand - Pain, New Patient       There were no vitals filed for this visit.    There is no height or weight on file to calculate BMI.      TEREZA Marquez NREMT

## 2023-02-07 NOTE — PROGRESS NOTES
HISTORY OF PRESENT ILLNESS  Ms. Isaac is a pleasant 87 year old female who presents to clinic today with bilateral hand pain.  Kate has bilateral hand pain that is longstanding.  She points to the knuckles of her pointer and middle fingers.  This has gotten worse over time.  She knits and plays cards for fun, she is feeling pain with these activities.        Additional history: as documented    MEDICAL HISTORY  Patient Active Problem List   Diagnosis     Osteopenia     Recurrent UTI     Breast cancer (H)     Malignant basal cell neoplasm of skin     Advance Care Planning     Seasonal allergies     CARDIOVASCULAR SCREENING; LDL GOAL LESS THAN 130     Urge incontinence     Female stress incontinence     Essential hypertension with goal blood pressure less than 140/90       Current Outpatient Medications   Medication Sig Dispense Refill     amLODIPine (NORVASC) 5 MG tablet Take 1 tablet (5 mg) by mouth daily 90 tablet 3     calcium carb 1250 mg, 500 mg [place],/vitamin D 200 units (OSCAL WITH D) 500-200 MG-UNIT per tablet Take 1 tablet by mouth 2 times daily (with meals)       estradiol (ESTRACE) 0.1 MG/GM vaginal cream Place 2 g vaginally twice a week 42.5 g 11     FIBER PO Take 2 capsules by mouth At Bedtime        Flaxseed, Linseed, (FLAX PO)        GLUCOSAMINE CHONDROITIN OR TABS 1 twice daily       losartan (COZAAR) 50 MG tablet Take 1 tablet (50 mg) by mouth daily 90 tablet 1     Multiple Vitamins-Minerals (PRESERVISION AREDS 2) CAPS One in AM and one in PM       TART BOSE PO          Allergies   Allergen Reactions     Ceftriaxone Swelling     Sulfa Drugs Rash     Lisinopril Fatigue       Family History   Problem Relation Age of Onset     Heart Disease Mother         cad at age 70s     Cancer Mother         KIDNEY     Diabetes Mother      Coronary Artery Disease Mother      Hypertension Mother      Heart Disease Father         chf     Neurologic Disorder Father         PARKINSONS     Alzheimer Disease Father       Heart Disease Maternal Grandmother         chf     Coronary Artery Disease Maternal Grandmother      Cancer Maternal Grandfather         ?     Heart Disease Paternal Grandmother      Coronary Artery Disease Paternal Grandmother      Heart Disease Paternal Grandfather      Neurologic Disorder Paternal Grandfather         PARKINSONS     Blood Disease Brother         LEUKEMIA     Breast Cancer Other      Diabetes Other         Mother's sister's daughter     Cancer Daughter         CERVICAL     Cancer Other         THYROID     Cerebrovascular Disease Other      Breast Cancer Daughter        Additional medical/Social/Surgical histories reviewed in [facility] and updated as appropriate.        PHYSICAL EXAM  General  - normal appearance, in no obvious distress    Musculoskeletal - right and left hands  - inspection: swelling at bilateral index and middle finger MCPs  - palpation: tender bilateral middle finger MCPs  - ROM:  MCP 90 deg flexion   0 deg extension    deg flexion   0 deg extension   DIP 80 deg flexion   0 deg extension  - strength: 5/5  strength, 5/5 wrist abduction, 5/5 flexion, extension, pronation, supination, adduction  - special tests:  (-) varus  (-) valgus  Neuro  - no numbness, no motor deficit, grossly normal coordination, normal muscle tone  Skin  - no ecchymosis, erythema, warmth, or induration, no obvious rash             ASSESSMENT & PLAN  Ms. Isaac is a 87 year old female who presents to clinic today with bilateral hand pain.    I ordered and independently reviewed x-rays of her hands, these do reveal bilateral osteoarthritis that is most severe at the MCP joints of the index and middle fingers, most severe at the left middle finger.    We discussed the pathophysiology and treatment strategies for osteoarthritis from a nonoperative perspective.  I do think she will do well with strengthening of the intrinsic hand muscles, I am referring her to hand therapy for this.  We also  discussed topical treatment with diclofenac gel and icing, we also discussed the use of specialized fingerless gloves to use with her activities.    It was a pleasure seeing Kate today.    Jam Lemons DO, [facility]  Primary Care Sports Medicine      This note was constructed using Dragon dictation software, please excuse any minor errors in spelling, grammar, or syntax.

## 2023-02-07 NOTE — LETTER
2/7/2023         RE: Kate Isaac  [address] [place] [id number]        Dear Colleague,    Thank you for referring your patient, Kate Isaac, to the [facility] [place]. Please see a copy of my visit note below.      HISTORY OF PRESENT ILLNESS  Ms. Isaac is a pleasant 87 year old female who presents to clinic today with bilateral hand pain.  Kate has bilateral hand pain that is longstanding.  She points to the knuckles of her pointer and middle fingers.  This has gotten worse over time.  She knits and plays cards for fun, she is feeling pain with these activities.        Additional history: as documented    MEDICAL HISTORY  Patient Active Problem List   Diagnosis     Osteopenia     Recurrent UTI     Breast cancer (H)     Malignant basal cell neoplasm of skin     Advance Care Planning     Seasonal allergies     CARDIOVASCULAR SCREENING; LDL GOAL LESS THAN 130     Urge incontinence     Female stress incontinence     Essential hypertension with goal blood pressure less than 140/90       Current Outpatient Medications   Medication Sig Dispense Refill     amLODIPine (NORVASC) 5 MG tablet Take 1 tablet (5 mg) by mouth daily 90 tablet 3     calcium carb 1250 mg, 500 mg [place],/vitamin D 200 units (OSCAL WITH D) 500-200 MG-UNIT per tablet Take 1 tablet by mouth 2 times daily (with meals)       estradiol (ESTRACE) 0.1 MG/GM vaginal cream Place 2 g vaginally twice a week 42.5 g 11     FIBER PO Take 2 capsules by mouth At Bedtime        Flaxseed, Linseed, (FLAX PO)        GLUCOSAMINE CHONDROITIN OR TABS 1 twice daily       losartan (COZAAR) 50 MG tablet Take 1 tablet (50 mg) by mouth daily 90 tablet 1     Multiple Vitamins-Minerals (PRESERVISION AREDS 2) CAPS One in AM and one in PM       TART BOSE PO          Allergies   Allergen Reactions     Ceftriaxone Swelling     Sulfa Drugs Rash     Lisinopril Fatigue       Family History   Problem Relation Age of Onset     Heart  Disease Mother         cad at age 70s     Cancer Mother         KIDNEY     Diabetes Mother      Coronary Artery Disease Mother      Hypertension Mother      Heart Disease Father         chf     Neurologic Disorder Father         PARKINSONS     Alzheimer Disease Father      Heart Disease Maternal Grandmother         chf     Coronary Artery Disease Maternal Grandmother      Cancer Maternal Grandfather         ?     Heart Disease Paternal Grandmother      Coronary Artery Disease Paternal Grandmother      Heart Disease Paternal Grandfather      Neurologic Disorder Paternal Grandfather         PARKINSONS     Blood Disease Brother         LEUKEMIA     Breast Cancer Other      Diabetes Other         Mother's sister's daughter     Cancer Daughter         CERVICAL     Cancer Other         THYROID     Cerebrovascular Disease Other      Breast Cancer Daughter        Additional medical/Social/Surgical histories reviewed in [facility] and updated as appropriate.        PHYSICAL EXAM  General  - normal appearance, in no obvious distress    Musculoskeletal - right and left hands  - inspection: swelling at bilateral index and middle finger MCPs  - palpation: tender bilateral middle finger MCPs  - ROM:  MCP 90 deg flexion   0 deg extension    deg flexion   0 deg extension   DIP 80 deg flexion   0 deg extension  - strength: 5/5  strength, 5/5 wrist abduction, 5/5 flexion, extension, pronation, supination, adduction  - special tests:  (-) varus  (-) valgus  Neuro  - no numbness, no motor deficit, grossly normal coordination, normal muscle tone  Skin  - no ecchymosis, erythema, warmth, or induration, no obvious rash             ASSESSMENT & PLAN  Ms. Isaac is a 87 year old female who presents to clinic today with bilateral hand pain.    I ordered and independently reviewed x-rays of her hands, these do reveal bilateral osteoarthritis that is most severe at the MCP joints of the index and middle fingers, most severe at the left  middle finger.    We discussed the pathophysiology and treatment strategies for osteoarthritis from a nonoperative perspective.  I do think she will do well with strengthening of the intrinsic hand muscles, I am referring her to hand therapy for this.  We also discussed topical treatment with diclofenac gel and icing, we also discussed the use of specialized fingerless gloves to use with her activities.    It was a pleasure seeing Kate today.    Jam Lemons DO, [facility]  Primary [facility] Medicine      This note was constructed using Dragon dictation software, please excuse any minor errors in spelling, grammar, or syntax.        Again, thank you for allowing me to participate in the care of your patient.        Sincerely,        Jam Lemons DO

## 2023-02-13 ENCOUNTER — MYC MEDICAL ADVICE (OUTPATIENT)
Dept: FAMILY MEDICINE | Facility: CLINIC | Age: 88
End: 2023-02-13
Payer: COMMERCIAL

## 2023-02-13 DIAGNOSIS — M25.511 RIGHT SHOULDER PAIN: Primary | ICD-10-CM

## 2023-02-13 DIAGNOSIS — M25.511 RIGHT SHOULDER PAIN, UNSPECIFIED CHRONICITY: ICD-10-CM

## 2023-02-28 ENCOUNTER — ANCILLARY PROCEDURE (OUTPATIENT)
Dept: GENERAL RADIOLOGY | Facility: CLINIC | Age: 88
End: 2023-02-28
Attending: FAMILY MEDICINE
Payer: COMMERCIAL

## 2023-02-28 ENCOUNTER — OFFICE VISIT (OUTPATIENT)
Dept: ORTHOPEDICS | Facility: CLINIC | Age: 88
End: 2023-02-28
Attending: FAMILY MEDICINE
Payer: COMMERCIAL

## 2023-02-28 DIAGNOSIS — M25.511 RIGHT SHOULDER PAIN, UNSPECIFIED CHRONICITY: ICD-10-CM

## 2023-02-28 DIAGNOSIS — M75.21 BICEPS TENDINITIS OF RIGHT UPPER EXTREMITY: ICD-10-CM

## 2023-02-28 DIAGNOSIS — M25.511 RIGHT SHOULDER PAIN, UNSPECIFIED CHRONICITY: Primary | ICD-10-CM

## 2023-02-28 PROCEDURE — 73030 X-RAY EXAM OF SHOULDER: CPT | Mod: RT | Performed by: RADIOLOGY

## 2023-02-28 PROCEDURE — 99214 OFFICE O/P EST MOD 30 MIN: CPT | Performed by: FAMILY MEDICINE

## 2023-02-28 ASSESSMENT — PAIN SCALES - GENERAL: PAINLEVEL: NO PAIN (0)

## 2023-02-28 NOTE — PROGRESS NOTES
CHIEF COMPLAINT:  Pain and New Patient of the Right Shoulder       HISTORY OF PRESENT ILLNESS  Ms. Isaac is a pleasant 87 year old female who presents to clinic today with right shoulder pain.  Kate has been experiencing pain in her right shoulder over the past many months.  She initially felt her shoulder bother her while she was playing golf.  She is right-handed, she felt a pull in her arm while swinging a club.  She points to the anterior and lateral portion of her shoulder.  This is painful and some ranges of motion, she denies any numbness or tingling.        Additional history: as documented    MEDICAL HISTORY  Patient Active Problem List   Diagnosis     Osteopenia     Recurrent UTI     Breast cancer (H)     Malignant basal cell neoplasm of skin     Advance Care Planning     Seasonal allergies     CARDIOVASCULAR SCREENING; LDL GOAL LESS THAN 130     Urge incontinence     Female stress incontinence     Essential hypertension with goal blood pressure less than 140/90       Current Outpatient Medications   Medication Sig Dispense Refill     amLODIPine (NORVASC) 5 MG tablet Take 1 tablet (5 mg) by mouth daily 90 tablet 3     calcium carb 1250 mg, 500 mg [employer] [place],/vitamin D 200 units (OSCAL WITH D) 500-200 MG-UNIT per tablet Take 1 tablet by mouth 2 times daily (with meals)       estradiol (ESTRACE) 0.1 MG/GM vaginal cream Place 2 g vaginally twice a week 42.5 g 11     FIBER PO Take 2 capsules by mouth At Bedtime        Flaxseed, Linseed, (FLAX PO)        GLUCOSAMINE CHONDROITIN OR TABS 1 twice daily       losartan (COZAAR) 50 MG tablet Take 1 tablet (50 mg) by mouth daily 90 tablet 1     Multiple Vitamins-Minerals (PRESERVISION AREDS 2) CAPS One in AM and one in PM       TART BOSE PO          Allergies   Allergen Reactions     Ceftriaxone Swelling     Sulfa Drugs Rash     Lisinopril Fatigue       Family History   Problem Relation Age of Onset     Heart Disease Mother         cad at age 70s     Cancer Mother          KIDNEY     Diabetes Mother      Coronary Artery Disease Mother      Hypertension Mother      Heart Disease Father         chf     Neurologic Disorder Father         PARKINSONS     Alzheimer Disease Father      Heart Disease Maternal Grandmother         chf     Coronary Artery Disease Maternal Grandmother      Cancer Maternal Grandfather         ?     Heart Disease Paternal Grandmother      Coronary Artery Disease Paternal Grandmother      Heart Disease Paternal Grandfather      Neurologic Disorder Paternal Grandfather         PARKINSONS     Blood Disease Brother         LEUKEMIA     Breast Cancer Other      Diabetes Other         Mother's sister's daughter     Cancer Daughter         CERVICAL     Cancer Other         THYROID     Cerebrovascular Disease Other      Breast Cancer Daughter        Additional medical/Social/Surgical histories reviewed in [facility] and updated as appropriate.        PHYSICAL EXAM  General  - normal appearance, in no obvious distress  Musculoskeletal - right shoulder  - inspection: normal bone and joint alignment, no obvious deformity, no scapular winging, no AC step-off  - palpation: tender biceps origin  - ROM: Painful at endrange flexion  - strength: 5/5  strength, 5/5 in all shoulder planes  - special tests:  (+) Speed's  (-) Neer  (-) Hawkin's  (-) Иван  Neuro  - no sensory or motor deficit, grossly normal coordination, normal muscle tone             ASSESSMENT & PLAN  Ms. Isaac is a 87 year old female who presents to clinic today with right shoulder pain.    I ordered and independently reviewed an x-ray of her shoulder, this shows mild enthesophytosis of the superior lateral humeral head.    Her symptoms do seem to be secondary to a biceps tendinopathy.  We discussed pathophysiology and treatment strategies including physical therapy, injection based treatments, and topical analgesics.  She is going to physical therapy, she should continue doing this as this is very likely to  help.    In the future we could consider corticosteroid injection, if her pain is not improving.    It was a pleasure seeing Kate today.    Jam Lemons DO, [facility]  Primary [facility] Medicine      This note was constructed using Dragon dictation software, please excuse any minor errors in spelling, grammar, or syntax.

## 2023-02-28 NOTE — LETTER
2/28/2023         RE: Kate Isaac  [address] [id number]        Dear Colleague,    Thank you for referring your patient, Kate Isaac, to the [facility] [place]. Please see a copy of my visit note below.    CHIEF COMPLAINT:  Pain and New Patient of the Right Shoulder       HISTORY OF PRESENT ILLNESS  Ms. Isaac is a pleasant 87 year old female who presents to clinic today with right shoulder pain.  Kate has been experiencing pain in her right shoulder over the past many months.  She initially felt her shoulder bother her while she was playing golf.  She is right-handed, she felt a pull in her arm while swinging a club.  She points to the anterior and lateral portion of her shoulder.  This is painful and some ranges of motion, she denies any numbness or tingling.        Additional history: as documented    MEDICAL HISTORY  Patient Active Problem List   Diagnosis     Osteopenia     Recurrent UTI     Breast cancer (H)     Malignant basal cell neoplasm of skin     Advance Care Planning     Seasonal allergies     CARDIOVASCULAR SCREENING; LDL GOAL LESS THAN 130     Urge incontinence     Female stress incontinence     Essential hypertension with goal blood pressure less than 140/90       Current Outpatient Medications   Medication Sig Dispense Refill     amLODIPine (NORVASC) 5 MG tablet Take 1 tablet (5 mg) by mouth daily 90 tablet 3     calcium carb 1250 mg, 500 mg [place],/vitamin D 200 units (OSCAL WITH D) 500-200 MG-UNIT per tablet Take 1 tablet by mouth 2 times daily (with meals)       estradiol (ESTRACE) 0.1 MG/GM vaginal cream Place 2 g vaginally twice a week 42.5 g 11     FIBER PO Take 2 capsules by mouth At Bedtime        Flaxseed, Linseed, (FLAX PO)        GLUCOSAMINE CHONDROITIN OR TABS 1 twice daily       losartan (COZAAR) 50 MG tablet Take 1 tablet (50 mg) by mouth daily 90 tablet 1     Multiple Vitamins-Minerals (PRESERVISION AREDS 2) CAPS One in  AM and one in PM       TART BOSE PO          Allergies   Allergen Reactions     Ceftriaxone Swelling     Sulfa Drugs Rash     Lisinopril Fatigue       Family History   Problem Relation Age of Onset     Heart Disease Mother         cad at age 70s     Cancer Mother         KIDNEY     Diabetes Mother      Coronary Artery Disease Mother      Hypertension Mother      Heart Disease Father         chf     Neurologic Disorder Father         PARKINSONS     Alzheimer Disease Father      Heart Disease Maternal Grandmother         chf     Coronary Artery Disease Maternal Grandmother      Cancer Maternal Grandfather         ?     Heart Disease Paternal Grandmother      Coronary Artery Disease Paternal Grandmother      Heart Disease Paternal Grandfather      Neurologic Disorder Paternal Grandfather         PARKINSONS     Blood Disease Brother         LEUKEMIA     Breast Cancer Other      Diabetes Other         Mother's sister's daughter     Cancer Daughter         CERVICAL     Cancer Other         THYROID     Cerebrovascular Disease Other      Breast Cancer Daughter        Additional medical/Social/Surgical histories reviewed in [facility] and updated as appropriate.        PHYSICAL EXAM  General  - normal appearance, in no obvious distress  Musculoskeletal - right shoulder  - inspection: normal bone and joint alignment, no obvious deformity, no scapular winging, no AC step-off  - palpation: tender biceps origin  - ROM: Painful at endrange flexion  - strength: 5/5  strength, 5/5 in all shoulder planes  - special tests:  (+) Speed's  (-) Neer  (-) Hawkin's  (-) Иван  Neuro  - no sensory or motor deficit, grossly normal coordination, normal muscle tone             ASSESSMENT & PLAN  Ms. Isaac is a 87 year old female who presents to clinic today with right shoulder pain.    I ordered and independently reviewed an x-ray of her shoulder, this shows mild enthesophytosis of the superior lateral humeral head.    Her symptoms do seem  to be secondary to a biceps tendinopathy.  We discussed pathophysiology and treatment strategies including physical therapy, injection based treatments, and topical analgesics.  She is going to physical therapy, she should continue doing this as this is very likely to help.    In the future we could consider corticosteroid injection, if her pain is not improving.    It was a pleasure seeing Kate today.    Jam Lemons DO, [facility]  Primary [facility] Medicine      This note was constructed using Dragon dictation software, please excuse any minor errors in spelling, grammar, or syntax.        Again, thank you for allowing me to participate in the care of your patient.        Sincerely,        Jam Lemons DO

## 2023-02-28 NOTE — NURSING NOTE
Chief Complaint   Patient presents with     Right Shoulder - Pain, New Patient       There were no vitals filed for this visit.    There is no height or weight on file to calculate BMI.      TEREZA Marquez NREMT

## 2023-03-07 ENCOUNTER — THERAPY VISIT (OUTPATIENT)
Dept: PHYSICAL THERAPY | Facility: CLINIC | Age: 88
End: 2023-03-07
Payer: COMMERCIAL

## 2023-03-07 DIAGNOSIS — M25.511 CHRONIC RIGHT SHOULDER PAIN: ICD-10-CM

## 2023-03-07 DIAGNOSIS — M25.511 RIGHT SHOULDER PAIN, UNSPECIFIED CHRONICITY: ICD-10-CM

## 2023-03-07 DIAGNOSIS — G89.29 CHRONIC RIGHT SHOULDER PAIN: ICD-10-CM

## 2023-03-07 DIAGNOSIS — M75.21 BICEPS TENDINITIS OF RIGHT UPPER EXTREMITY: ICD-10-CM

## 2023-03-07 PROCEDURE — 97110 THERAPEUTIC EXERCISES: CPT | Mod: GP | Performed by: PHYSICAL THERAPIST

## 2023-03-07 PROCEDURE — 97161 PT EVAL LOW COMPLEX 20 MIN: CPT | Mod: GP | Performed by: PHYSICAL THERAPIST

## 2023-03-07 NOTE — PROGRESS NOTES
Physical Therapy Initial Evaluation  Subjective:  The history is provided by the patient. No  was used.   Therapist Generated HPI Evaluation  Problem details: Patient reports fall of 2020 injured playing golf and had PT which helped but was put on hold when having knee replacement. Now still having some shoulder pain but just does not want to flare it up with golfing this spring. .         Type of problem:  Right shoulder.    This is a chronic (2/28/23) condition.  Condition occurred with:  Lifting.  Where condition occurred: at home.  Patient reports pain:  Anterior.  Pain is described as aching and is intermittent.  Pain radiates to:  Upper arm. Pain is the same all the time.  Since onset symptoms are gradually improving.  Associated symptoms:  Loss of strength and loss of motion/stiffness. Exacerbated by: golfing, lifting.   Relieved by: tyelnol.  Special tests included:  X-ray (bicep tendonopathy).  Previous treatment includes physical therapy. There was moderate improvement following previous treatment.  Barriers include:  None as reported by patient.    Patient Health History  Kate Isaac being seen for right shoulder.     Problem began: 2/28/2023 (date of MD order).   Problem occurred: golfing in 2020   Pain is reported as 1/10 on pain scale.  General health as reported by patient is good.  Pertinent medical history includes: cancer, high blood pressure, osteoarthritis and smoking.   Red flags:  None as reported by patient.   Other medical allergies details: see chart.   Surgeries include:  Orthopedic surgery and cancer surgery. Other surgery history details: breast 1996, knee replacement 11/20, 11/21.    Current medications:  High blood pressure medication and pain medication.    Current occupation is retired office /. .      Other job/home tasks details: household tasks. .                                  Objective:  Standing Alignment:      Shoulder/UE:  Rounded shoulders  and elevated scapula R                                       Shoulder Evaluation:  ROM:  AROM:    Flexion:  Left:  152    Right:  146  Extension: Left: 62Right: 52  Abduction:  Left: 171   Right:  162      External Rotation:  Left:  52    Right:  30            Extension/Internal Rotation:  Left:  T3    Right:  T4    PROM:    Flexion:  Right: 148 ERP      Abduction:  Right:  165      External Rotation:  Right:  33                    Strength:    Flexion: Left:5/5 Strong/pain free    Pain:    Right: /5 weak/painful    Pain:   Extension:  Left: 5/5  Strong/pain free    Pain:    Right: 5/5    Strong/pain free  Pain:  Abduction:  Left: 5/5  Strong/pain free  Pain:    Right: 4/5   Weak/painful    Pain:    Internal Rotation:  Left:5/5   Strong/pain free    Pain:    Right: 5/5   Strong/pain free    Pain:  External Rotation:   Left:5/5   Strong/pain free    Pain:   Right:3+/5   Weak/painful    Pain:        Elbow Flexion:  Left:5/5   Strong/pain free    Pain:    Right:5/5   Strong/pain free    Pain:    Stability Testing:  normal      Special Tests:      Right shoulder positive for the following special tests:Impingement  Palpation:      Right shoulder tenderness present at: Bicipital Groove  Right shoulder tenderness not present at:Levator; Rhomboids or Upper Trap  Mobility Tests:      Glenohumeral posterior right:  Hypomobile                                             General     ROS    Assessment/Plan:    Patient is a 87 year old female with right side shoulder complaints.    Patient has the following significant findings with corresponding treatment plan.                Diagnosis 1:  Right shoulder pain / impingement  Pain -  hot/cold therapy, US, manual therapy, self management, education, directional preference exercise and home program  Decreased ROM/flexibility - manual therapy, therapeutic exercise, therapeutic activity and home program  Decreased strength - therapeutic exercise, therapeutic activities and home  program  Decreased function - therapeutic activities and home program  Impaired posture - neuro re-education, therapeutic activities and home program    Therapy Evaluation Codes:   1) History comprised of:   Personal factors that impact the plan of care:      None.    Comorbidity factors that impact the plan of care are:      None.     Medications impacting care: None.  2) Examination of Body Systems comprised of:   Body structures and functions that impact the plan of care:      Shoulder.   Activity limitations that impact the plan of care are:      Lifting and Sports.  3) Clinical presentation characteristics are:   Stable/Uncomplicated.  4) Decision-Making    Low complexity using standardized patient assessment instrument and/or measureable assessment of functional outcome.  Cumulative Therapy Evaluation is: Low complexity.    Previous and current functional limitations:  (See Goal Flow Sheet for this information)    Short term and Long term goals: (See Goal Flow Sheet for this information)     Communication ability:  Patient appears to be able to clearly communicate and understand verbal and written communication and follow directions correctly.  Treatment Explanation - The following has been discussed with the patient:   RX ordered/plan of care  Anticipated outcomes  Possible risks and side effects  This patient would benefit from PT intervention to resume normal activities.   Rehab potential is excellent.    Frequency:  1 X week, once daily  Duration:  for 8 weeks  Discharge Plan:  Achieve all LTG.  Independent in home treatment program.  Reach maximal therapeutic benefit.    Please refer to the daily flowsheet for treatment today, total treatment time and time spent performing 1:1 timed codes.

## 2023-03-07 NOTE — PROGRESS NOTES
[facility]    OUTPATIENT Physical Therapy ORTHOPEDIC EVALUATION  PLAN OF TREATMENT FOR OUTPATIENT REHABILITATION  (COMPLETE FOR INITIAL CLAIMS ONLY)  Patient's Last Name, First Name, M.I.  YOB: 1935  Kate Isaac    Provider s Name:  [facility]   Medical Record No.  [id number]   Start of Care Date:  03/07/23   Onset Date:   02/28/23   Treatment Diagnosis:  right shoulder pain / impingement Medical Diagnosis:     Right shoulder pain, unspecified chronicity  Biceps tendinitis of right upper extremity  Chronic right shoulder pain       Goals:     03/07/23 0500   Body Part   Goals listed below are for right shoulder   Goal #1   Goal #1 lifting/carrying   Previous Functional Level No restrictions   Current Functional Level Can lift;an item overhead weighing   Performance level 3lbs 4/10 PL   STG Target Performance Lift an item overhead weighing   Performance level 3lbs 1/10 PL or less   Rationale for housework such as laundry, emptying garbage, use of ;for meal preparation;for grocery shopping   Due date 04/04/23   LTG Target Performance Lift an item overhead weighing   Performance Level 6lbs 1/10 PL or less   Rationale for grocery shopping;for meal preparation;for housework such as laundry, emptying garbage, use of    Due date 05/02/23         Therapy Frequency:  1 x/ week  Predicted Duration of Therapy Intervention:  8 week    Dillon Tanner, PT                 I CERTIFY THE NEED FOR THESE SERVICES FURNISHED UNDER        THIS PLAN OF TREATMENT AND WHILE UNDER MY CARE     (Physician attestation of this document indicates review and certification of the therapy plan).                     Certification Date From:  03/07/23   Certification Date To:  05/02/23    Referring Provider:  Jam Lemons    Initial Assessment        See Epic Evaluation  SOC Date: 03/07/23

## 2023-03-14 ENCOUNTER — THERAPY VISIT (OUTPATIENT)
Dept: PHYSICAL THERAPY | Facility: CLINIC | Age: 88
End: 2023-03-14
Payer: COMMERCIAL

## 2023-03-14 DIAGNOSIS — G89.29 CHRONIC RIGHT SHOULDER PAIN: Primary | ICD-10-CM

## 2023-03-14 DIAGNOSIS — M25.511 CHRONIC RIGHT SHOULDER PAIN: Primary | ICD-10-CM

## 2023-03-14 PROCEDURE — 97110 THERAPEUTIC EXERCISES: CPT | Mod: GP | Performed by: PHYSICAL THERAPIST

## 2023-03-14 PROCEDURE — 97112 NEUROMUSCULAR REEDUCATION: CPT | Mod: GP | Performed by: PHYSICAL THERAPIST

## 2023-03-15 ENCOUNTER — THERAPY VISIT (OUTPATIENT)
Dept: OCCUPATIONAL THERAPY | Facility: CLINIC | Age: 88
End: 2023-03-15
Attending: FAMILY MEDICINE
Payer: COMMERCIAL

## 2023-03-15 DIAGNOSIS — M79.641 BILATERAL HAND PAIN: ICD-10-CM

## 2023-03-15 DIAGNOSIS — M79.642 BILATERAL HAND PAIN: ICD-10-CM

## 2023-03-15 PROCEDURE — 97165 OT EVAL LOW COMPLEX 30 MIN: CPT | Mod: GO

## 2023-03-15 PROCEDURE — 97535 SELF CARE MNGMENT TRAINING: CPT | Mod: GO

## 2023-03-15 PROCEDURE — 97110 THERAPEUTIC EXERCISES: CPT | Mod: GO

## 2023-03-15 NOTE — PROGRESS NOTES
Hand Therapy Initial Evaluation    Current Date:  3/15/2023  Referring Provider: Jam Lemons DO  Order Date: 2/7/2023  DO Note from 2/7/2023 Visit: I do think she will do well with strengthening of the intrinsic hand muscles    Diagnosis: Bilateral hand pain   DOI: About 8 months    Subjective:  Kate Isaac is a 87 year old female.    Answers for HPI/ROS submitted by the patient on 3/14/2023  Please check all that apply to your current or past medical history: none  Surgeries: orthopedic surgery, cancer surgery  Medications you are currently taking: high blood pressure medication  Occupation:: Retired    Occupational Profile Information:  Right hand dominant  Prior functional level:  independent-shared household chores  Patient reports symptoms of pain, stiffness/loss of motion, weakness/loss of strength and edema  Special tests:  x-ray.    Previous treatment: OTC medication, vitamins  Transportation: drives  Leisure activities/hobbies: golfing, playing Bridge, hiking/walking    Functional Outcome Measure:   Upper Extremity Functional Index Score:  SCORE:   Column Totals: /80: (P) 78   (A lower score indicates greater disability.)    Objective:  Pain Level (Scale 0-10)   3/15/2023   At Rest 0/10   With Use 3/10     Pain Description  Date 3/15/2023   Location Dorsal thumb at MP joint (right hand is worse than left)   Pain Quality Aching and Dull   Frequency intermittent     Pain is worst daytime   Exacerbated by grasping/gripping   Relieved by otc medications   Progression Unchanged     Edema  Moderate edema at B dorsal MP joints of IF and LF    Sensation   WNL throughout all nerve distributions; per patient report    ROM  Hand 3/15/2023 3/15/2023   AROM(PROM) R L   Index MP -21/90 -27/92   PIP -2/101 0/105   DIP -4/55 0/53   WILSON 219 223   Long MP -27/93 -34/91   PIP -14/100 0/103   DIP -7/55 -7/53   WILSON 200 206     Thumb 3/15/2023 3/15/2023   AROM  (PROM) R L   MP /59 /61   IP /53 /58   RABD 39 36   PABD 31 33    Kapandji Opposition Scale (0-10/10) 8+ 9+     Thumb Observation/Appearance   - none  + mild    ++ moderate    +++ severe     3/15/2023   Shoulder deformity present over CMC R: +  L: +   Edema over the CMC joint R: -  L: -   Noted collapse of MP into hyperextension during pinch R: +  L: +      Provocative Tests  Pain Report:  - none    + mild    ++ moderate    +++ severe     3/15/2023   Crepitus present R: -  L: -   CMC Adduction Stress Test R: +  L: +   CMC Extension Stress Test R: +  L: +   Finkelstein's R: +  L: +   WHAT Test R: -  L: +     Strength   (Measured in pounds)  Pain Report: - none  + mild    ++ moderate    +++ severe    3/15/2023 3/15/2023   Trials R L   1  2  3 31 33   Average 31 33     Lat Pinch 3/15/2023 3/15/2023   Trials R L   1  2  3 8 7   Average 8 7     3 Pt Pinch 3/15/2023 3/15/2023   Trials R L   1  2  3 8 7   Average 8 7     Palpation   Pain Report:  - none    + mild    ++ moderate    +++ severe    3/15/2023   CMC Joint Line R: ++  L: -   Thenar Eminence R: -  L: -   Web Space R: +  L: +   1st DC R: ++  L: +   Radial Styloid R: -  L: +   FCR R: -  L: +     Assessment:  Patient presents with symptoms consistent with diagnosis of bilateral hand pain, with conservative intervention.     Patient's limitations or Problem List includes:  Pain, Decreased ROM/motion, Increased edema, Weakness, Decreased stability, Decreased , Decreased pinch and Tightness in musculature of the bilateral hand which interferes with the patient's ability to perform Household Chores as compared to previous level of function.    Rehab Potential:  Good - Return to full activity, some limitations    Patient will benefit from skilled Occupational Therapy to increase ROM, flexibility, motion, overall strength,  strength, pinch strength and stability of thumb and decrease pain and edema to return to previous activity level and resume normal daily tasks and to reach their rehab potential.    Barriers to Learning:   No barrier    Communication Issues:  Patient appears to be able to clearly communicate and understand verbal and written communication and follow directions correctly.    Chart Review: Chart Review and Simple history review with patient    Identified Performance Deficits: home establishment and management and meal preparation and cleanup    Assessment of Occupational Performance:  1-3 Performance Deficits    Clinical Decision Making (Complexity): Low complexity    Treatment Explanation:  The following has been discussed with the patient:  RX ordered/plan of care  Anticipated outcomes  Possible risks and side effects    Plan:  Frequency:  1 X week, once daily  Duration:  for 4 weeks    Treatment Plan:   Modalities:  US and Paraffin  Therapeutic Exercise:  AROM, AAROM, PROM, Tendon Gliding, Blocking, Reverse Blocking, Place and Hold, Contract Relax, Isotonics, Isometrics and Stabilization  Neuromuscular re-education:  Nerve Gliding and Kinesiotaping  Manual Techniques:  Joint mobilization, Friction massage, Myofascial release and Manual edema mobilization  Orthotic Fabrication:  Static  Self Care:  Self Care Tasks and Ergonomic Considerations    Discharge Plan:  Achieve all LTG.  Independent in home treatment program.  Reach maximal therapeutic benefit.    Home Exercise Program:  Arthritis Tips/Joint Protection  Warmth  [place] Massage to Thumb  Thumb Stabilization Web Space Release Method 1 with Clip  Thumb Stabilization Strengthening Isometric C  Thumb Stabilization 1st Dorsal Interosseous  Intrinsic Strengthening Finger Adduction    Next Visit:  Review HEP  US  MFR  Joint mobilization  Strengthening/stabilization

## 2023-03-15 NOTE — PROGRESS NOTES
[facility]    OUTPATIENT Occupational Therapy ORTHOPEDIC EVALUATION  PLAN OF TREATMENT FOR OUTPATIENT REHABILITATION  (COMPLETE FOR INITIAL CLAIMS ONLY)  Patient's Last Name, First Name, M.I.  YOB: 1935  Kate Isaac    Provider s Name:  [facility]   Medical Record No.  [id number]   Start of Care Date:  03/15/23   Onset Date:   02/07/23 (Order Date)   Treatment Diagnosis:  Bilateral hand pain Medical Diagnosis:  Bilateral hand pain       Goals:     03/15/23 0500   Goal #1   Goal #1 household chores   Previous Performance Level Independent   Current Functional Task    Current Performance Level 3/10 pain   STG Target Perfomance Open a tight or new jar   STG Target Perform Level 1/10 pain   Due Date 03/29/23   LTG Target Task/Performance Pain free household chores   Due Date 04/12/23         Therapy Frequency:  1x weekly  Predicted Duration of Therapy Intervention:  4 weeks    Yaquelin Deluca OT                 I CERTIFY THE NEED FOR THESE SERVICES FURNISHED UNDER        THIS PLAN OF TREATMENT AND WHILE UNDER MY CARE     (Physician attestation of this document indicates review and certification of the therapy plan).                     Certification Date From:  03/15/23   Certification Date To:  04/12/23    Referring Provider:  Jam Lemons    Initial Assessment        See Epic Evaluation SOC Date: 03/15/23

## 2023-03-17 ENCOUNTER — OFFICE VISIT (OUTPATIENT)
Dept: URGENT CARE | Facility: URGENT CARE | Age: 88
End: 2023-03-17
Payer: COMMERCIAL

## 2023-03-17 VITALS
SYSTOLIC BLOOD PRESSURE: 138 MMHG | RESPIRATION RATE: 17 BRPM | TEMPERATURE: 98.1 F | DIASTOLIC BLOOD PRESSURE: 78 MMHG | WEIGHT: 131 LBS | BODY MASS INDEX: 23.21 KG/M2 | HEART RATE: 98 BPM | OXYGEN SATURATION: 98 %

## 2023-03-17 DIAGNOSIS — R42 LIGHTHEADEDNESS: Primary | ICD-10-CM

## 2023-03-17 PROCEDURE — 93000 ELECTROCARDIOGRAM COMPLETE: CPT | Performed by: EMERGENCY MEDICINE

## 2023-03-17 PROCEDURE — 99213 OFFICE O/P EST LOW 20 MIN: CPT | Mod: CS | Performed by: EMERGENCY MEDICINE

## 2023-03-17 PROCEDURE — U0005 INFEC AGEN DETEC AMPLI PROBE: HCPCS | Performed by: EMERGENCY MEDICINE

## 2023-03-17 PROCEDURE — U0003 INFECTIOUS AGENT DETECTION BY NUCLEIC ACID (DNA OR RNA); SEVERE ACUTE RESPIRATORY SYNDROME CORONAVIRUS 2 (SARS-COV-2) (CORONAVIRUS DISEASE [COVID-19]), AMPLIFIED PROBE TECHNIQUE, MAKING USE OF HIGH THROUGHPUT TECHNOLOGIES AS DESCRIBED BY CMS-2020-01-R: HCPCS | Performed by: EMERGENCY MEDICINE

## 2023-03-17 NOTE — PROGRESS NOTES
"Assessment & Plan     Diagnosis:  (R42) Lightheadedness  (primary encounter diagnosis)      Medical Decision Making:  Kate Isaac is a 87 year old female who presents for evaluation of lightheadedness laurel \"hot flash\" this morning.  This is clearly not a vertiginous sensation so would not do further workup for peripheral or central vertigo.  A broad differential of their lightheadedness was considered.  Patient with no objective findings on exam, he is feeling better from this morning. EKG is without dysrhythmia or signs of ACS COVID-19 PCR is pending at this time. No serious etiologies of lightheadedness were found in workup here today. Patient feels improved after drinking this morning, is hemodynamically stable here, afebrile with a benign abdominal exam and no urinary symptoms.  There is no signs or symptoms concerning for pneumonia or UTI.  Supportive outpatient management is indicated with close follow-up with PCP.      Patient voices understanding and agreement with the plan including reasons to go to the ER immediately as well as to be seen by a more consistent care-giver, such as their PCP, if the symptoms persist more than 2 days.       Peter Yepez PA-C  [facility]    Subjective     Kate Isaac is a 87 year old female who presents to clinic today for the following health issues:  Chief Complaint   Patient presents with     Menopausal Sx     Sx she woke up this morning and had a hot flashes, concerned about her  .  Had a hot flash and sort of tired, and laid down, then got up and unloaded the . Completed Covid test. Her concern is Hx of heart issues, and really not feeling well.      not feeling right       HPI  Patient states that she woke up this morning feeling slightly lightheaded and having a \"hot flash\" but feels that this may have been related to anxiety about her 's condition as he has been very weak over the last 4 days.  She was feeling very " tired this morning, but eventually after laying down for bed got up and unloading the , has been feeling slightly better since eating and drinking.  She took a COVID test this morning that was negative.  She denies any other symptoms including dysuria, frequency of urination, abdominal pain, chest pain, shortness of breath, headaches, dizziness, nausea, vomiting, diarrhea, fevers, cough or other concerns.    Review of Systems    See HPI    Objective      Vitals: /78   Pulse 98   Temp 98.1  F (36.7  C) (Tympanic)   Resp 17   Wt 59.4 kg (131 lb)   SpO2 98%   BMI 23.21 kg/m        Patient Vitals for the past 24 hrs:   BP Temp Temp src Pulse Resp SpO2 Weight   03/17/23 1042 138/78 98.1  F (36.7  C) Tympanic 98 17 98 % 59.4 kg (131 lb)       Vital signs reviewed by: Peter Yepez PA-C    Physical Exam   Constitutional: Patient is alert and cooperative. No acute distress.  Mouth: Mucous membranes are moist. Normal tongue and tonsil. Posterior oropharynx is clear.  Eyes: Conjunctivae, EOMI and lids are normal. PERRL.  Cardiovascular: Regular rate and rhythm  Pulmonary/Chest: Lungs are clear to auscultation throughout. Effort normal. No respiratory distress. No wheezes, rales or rhonchi.  GI: Abdomen is soft and non-tender throughout. No CVA tenderness bilaterally.  Neurological: Alert and oriented x3. CN 3-7 and 9-12 intact. Strength and sensation are intact and symmetric in the bilateral upper and lower extremities.   Skin: No rash noted on visualized skin.  Psychiatric:The patient has a normal mood and affect.     Labs/Imaging:  COVID-19 PCR: Pending      EKG - Reviewed and interpreted by: Peter Yepez PA-C  Rate: 82 bpm   HI: 142 ms  QTc: 406 ms  Normal sinus rhythm. normal intervals, no acute ST/T changes c/w ischemia, no LVH by voltage criteria, ST segment in V2 has normalized and is upright when compared with EKG dated 11/11/2020.        Peter Yepez PA-C, March 17, 2023

## 2023-03-18 ENCOUNTER — MYC MEDICAL ADVICE (OUTPATIENT)
Dept: FAMILY MEDICINE | Facility: CLINIC | Age: 88
End: 2023-03-18
Payer: COMMERCIAL

## 2023-03-18 LAB — SARS-COV-2 RNA RESP QL NAA+PROBE: NEGATIVE

## 2023-03-21 ENCOUNTER — THERAPY VISIT (OUTPATIENT)
Dept: PHYSICAL THERAPY | Facility: CLINIC | Age: 88
End: 2023-03-21
Payer: COMMERCIAL

## 2023-03-21 DIAGNOSIS — M25.511 CHRONIC RIGHT SHOULDER PAIN: Primary | ICD-10-CM

## 2023-03-21 DIAGNOSIS — G89.29 CHRONIC RIGHT SHOULDER PAIN: Primary | ICD-10-CM

## 2023-03-21 PROCEDURE — 97110 THERAPEUTIC EXERCISES: CPT | Mod: GP | Performed by: PHYSICAL THERAPIST

## 2023-03-21 NOTE — PROGRESS NOTES
Subjective:  HPI  Physical Exam                    Objective:  System    Physical Exam    General     ROS    Assessment/Plan:    DISCHARGE REPORT    Progress reporting period is from 3/7/23 to 3/21/23.       SUBJECTIVE  Patient reports struggling to get her exercises in 2 x/ day. Needs a better long term plan. Feels she is sleeping better.    Current Pain level: 0/10.     Initial Pain level: 4/10.   Changes in function:  Yes (See Goal flowsheet attached for changes in current functional level)  Adverse reaction to treatment or activity: None    OBJECTIVE  Changes noted in objective findings:  Yes,  flex 155, abd 160, ER 30 deg, Ext/R T8 flex 5/5,abd 4/5, IR 5/5, ER 3/5    ASSESSMENT/PLAN  Updated problem list and treatment plan: Diagnosis 1:  Chronic right shoulder pain  / possible rotator cuff tear of infraspinatus  Pain -  home program  Decreased strength - home program  Decreased function - home program  STG/LTGs have been met or progress has been made towards goals:  Yes (See Goal flow sheet completed today.)  Assessment of Progress: The patient's condition is improving.  Self Management Plans:  Patient is independent in a home treatment program.  I have re-evaluated this patient and find that the nature, scope, duration and intensity of the therapy is appropriate for the medical condition of the patient.  Kate continues to require the following intervention to meet STG and LTG's:  HEP    Recommendations:  This patient is ready to be discharged from therapy and continue their home treatment program. Patient no having much for pain and happy with HEP. Would like to continue with HEP independently and will return if symptoms fail to completely resolve or worsen.       Please refer to the daily flowsheet for treatment today, total treatment time and time spent performing 1:1 timed codes.

## 2023-03-21 NOTE — TELEPHONE ENCOUNTER
She was seen in UC and not having any residual or new symptoms. Would just watch for now and have her be seen if she has further episodes.     Lani Valencia MD

## 2023-03-22 ENCOUNTER — THERAPY VISIT (OUTPATIENT)
Dept: OCCUPATIONAL THERAPY | Facility: CLINIC | Age: 88
End: 2023-03-22
Attending: FAMILY MEDICINE
Payer: COMMERCIAL

## 2023-03-22 DIAGNOSIS — M79.641 BILATERAL HAND PAIN: Primary | ICD-10-CM

## 2023-03-22 DIAGNOSIS — M79.642 BILATERAL HAND PAIN: Primary | ICD-10-CM

## 2023-03-22 PROCEDURE — 97140 MANUAL THERAPY 1/> REGIONS: CPT | Mod: GO

## 2023-03-22 PROCEDURE — 97110 THERAPEUTIC EXERCISES: CPT | Mod: GO

## 2023-03-28 ENCOUNTER — OFFICE VISIT (OUTPATIENT)
Dept: ORTHOPEDICS | Facility: CLINIC | Age: 88
End: 2023-03-28
Payer: COMMERCIAL

## 2023-03-28 DIAGNOSIS — M25.511 RIGHT SHOULDER PAIN, UNSPECIFIED CHRONICITY: Primary | ICD-10-CM

## 2023-03-28 PROCEDURE — 99213 OFFICE O/P EST LOW 20 MIN: CPT | Performed by: FAMILY MEDICINE

## 2023-03-28 ASSESSMENT — PAIN SCALES - GENERAL: PAINLEVEL: NO PAIN (0)

## 2023-03-28 NOTE — NURSING NOTE
Chief Complaint   Patient presents with     Right Shoulder - Follow Up     Right Hand - Follow Up     Left Hand - Follow Up       There were no vitals filed for this visit.    There is no height or weight on file to calculate BMI.      TEREZA Marquez NREMT

## 2023-03-28 NOTE — PROGRESS NOTES
HISTORY OF PRESENT ILLNESS  Ms. Isaac is a pleasant 87 year old female following up with right shoulder pain.  Kate has fortunately been doing great.  The exercises that she has been performing have been helping quite a bit.  She does have some residual anterior shoulder pain, although by enlarge she is much improved.     PHYSICAL EXAM  General  - normal appearance, in no obvious distress  Musculoskeletal - right shoulder  - inspection: normal bone and joint alignment, no obvious deformity, no scapular winging, no AC step-off  - palpation: mildly tender anterior shoulder  - ROM: no pain  - strength: 5/5  strength, 5/5 in all shoulder planes  - special tests:  (weakly +) Speed's  (-) Neer  (-) Hawkin's  (-) Иван's  (-) Bethel's  (-) subscap lift-off  Neuro  - no sensory or motor deficit, grossly normal coordination, normal muscle tone          ASSESSMENT & PLAN  Ms. Isaac is a 87 year old female following up with right shoulder pain.    Her improvement is measurable, she is currently happy with how she is feeling.  I do think it is a good idea for her to continue her exercises, which she will do over the course of the next few months.    We did discuss that if her pain returns or is otherwise severe we should follow-up, we could consider a corticosteroid injection if this is the case.    It was a pleasure seeing Kate.        Jam Lemons DO, CAQSM      This note was constructed using Dragon dictation software, please excuse any minor errors in spelling, grammar, or syntax.

## 2023-03-28 NOTE — LETTER
3/28/2023         RE: Kate Isaac  [address] [address]        Dear Colleague,    Thank you for referring your patient, Kate Isaac, to the [facility] [place]. Please see a copy of my visit note below.    HISTORY OF PRESENT ILLNESS  Ms. Isaac is a pleasant 87 year old female following up with right shoulder pain.  Kate has fortunately been doing great.  The exercises that she has been performing have been helping quite a bit.  She does have some residual anterior shoulder pain, although by enlarge she is much improved.     PHYSICAL EXAM  General  - normal appearance, in no obvious distress  Musculoskeletal - right shoulder  - inspection: normal bone and joint alignment, no obvious deformity, no scapular winging, no AC step-off  - palpation: mildly tender anterior shoulder  - ROM: no pain  - strength: 5/5  strength, 5/5 in all shoulder planes  - special tests:  (weakly +) Speed's  (-) Neer  (-) Hawkin's  (-) Иван's  (-) [place]'s  (-) subscap lift-off  Neuro  - no sensory or motor deficit, grossly normal coordination, normal muscle tone          ASSESSMENT & PLAN  Ms. Isaac is a 87 year old female following up with right shoulder pain.    Her improvement is measurable, she is currently happy with how she is feeling.  I do think it is a good idea for her to continue her exercises, which she will do over the course of the next few months.    We did discuss that if her pain returns or is otherwise severe we should follow-up, we could consider a corticosteroid injection if this is the case.    It was a pleasure seeing Kate.        Jam Lemons DO, GINNA      This note was constructed using Dragon dictation software, please excuse any minor errors in spelling, grammar, or syntax.        Again, thank you for allowing me to participate in the care of your patient.        Sincerely,        Jam Lemons DO

## 2023-03-29 ENCOUNTER — THERAPY VISIT (OUTPATIENT)
Dept: OCCUPATIONAL THERAPY | Facility: CLINIC | Age: 88
End: 2023-03-29
Attending: FAMILY MEDICINE
Payer: COMMERCIAL

## 2023-03-29 DIAGNOSIS — M79.641 BILATERAL HAND PAIN: Primary | ICD-10-CM

## 2023-03-29 DIAGNOSIS — M79.642 BILATERAL HAND PAIN: Primary | ICD-10-CM

## 2023-03-29 PROCEDURE — 97140 MANUAL THERAPY 1/> REGIONS: CPT | Mod: GO

## 2023-03-29 PROCEDURE — 97110 THERAPEUTIC EXERCISES: CPT | Mod: GO

## 2023-04-01 DIAGNOSIS — I10 HYPERTENSION GOAL BP (BLOOD PRESSURE) < 140/90: ICD-10-CM

## 2023-04-02 RX ORDER — AMLODIPINE BESYLATE 5 MG/1
TABLET ORAL
Qty: 90 TABLET | Refills: 3 | Status: SHIPPED | OUTPATIENT
Start: 2023-04-02 | End: 2023-08-21

## 2023-04-19 ENCOUNTER — THERAPY VISIT (OUTPATIENT)
Dept: OCCUPATIONAL THERAPY | Facility: CLINIC | Age: 88
End: 2023-04-19
Payer: COMMERCIAL

## 2023-04-19 DIAGNOSIS — M79.641 BILATERAL HAND PAIN: Primary | ICD-10-CM

## 2023-04-19 DIAGNOSIS — M79.642 BILATERAL HAND PAIN: Primary | ICD-10-CM

## 2023-04-19 PROCEDURE — 97110 THERAPEUTIC EXERCISES: CPT | Mod: GO

## 2023-04-19 NOTE — PROGRESS NOTES
Hand Therapy Discharge Note    Current Date:  4/19/2023  Referring Provider: Jam Lemons DO  Order Date: 2/7/2023  DO Note from 2/7/2023 Visit: I do think she will do well with strengthening of the intrinsic hand muscles    Diagnosis: Bilateral hand pain   DOI: About 8 months    Reporting period is 3/15/2023 to 4/19/2023    Subjective:   Subjective changes noted by patient:  I was struggling to get into a routine with the exercises. I couldn't tell if I was overdoing it. I was sore after my exercises, but I think I should be. I do take Tylenol in the morning, but not for my hand. It's for my shoulder.   Functional changes noted by patient:  Improvement in overhead reaching  Patient has noted adverse reaction to:  None    Functional Outcome Measure:  Upper Extremity Functional Index Score:  SCORE:   Column Totals: /80: 79   (A lower score indicates greater disability.)    Objective:  Pain Level (Scale 0-10)   3/15/2023 4/19/2023   At Rest 0/10 0/10   With Use 3/10 0-1/10     Pain Description  Date 3/15/2023   Location Dorsal thumb at MP joint (right hand is worse than left)   Pain Quality Aching and Dull   Frequency intermittent     Pain is worst daytime   Exacerbated by grasping/gripping   Relieved by otc medications   Progression Unchanged     Edema  Moderate edema at B dorsal MP joints of IF and LF    Sensation   WNL throughout all nerve distributions; per patient report    ROM  Hand 3/15/2023 3/15/2023 4/19/2023 4/19/2023   AROM(PROM) R L R L   Index MP -21/90 -27/92 -21/90 -27/92   PIP -2/101 0/105 -2/102 0/104   DIP -4/55 0/53 -4/58 0/62   WILSON 219 223 223 231   Long MP -27/93 -34/91 -27/93 -34/97   PIP -14/100 0/103 -14/104 0/107   DIP -7/55 -7/53 -7/58 -7/64   WILSON 200 206 207 227     Thumb 3/15/2023 3/15/2023 4/19/2023 4/19/2023   AROM  (PROM) R L R L   MP /59 /61 /-62 /61   IP /53 /58 /64 /58   RABD 39 36     PABD 31 33     K[place]andji Opposition Scale (0-10/10) 8+ 9+ 9 9     Thumb Observation/Appearance   -  none  + mild    ++ moderate    +++ severe     3/15/2023   Shoulder deformity present over CMC R: +  L: +   Edema over the CMC joint R: -  L: -   Noted collapse of MP into hyperextension during pinch R: +  L: +      Provocative Tests  Pain Report:  - none    + mild    ++ moderate    +++ severe     3/15/2023 4/19/2023   Crepitus present R: -  L: -    CMC Adduction Stress Test R: +  L: + R: -  L: -   CMC Extension Stress Test R: +  L: + R: -  L: -   Finkelstein's R: +  L: + R: +  L: +   WHAT Test R: -  L: + L: -     Strength   (Measured in pounds)  Pain Report: - none  + mild    ++ moderate    +++ severe    3/15/2023 3/15/2023 4/19/2023 4/19/2023   Trials R L R L   1  2  3 31 33 44 37   Average 31 33 44 37     Lat Pinch 3/15/2023 3/15/2023   Trials R L   1  2  3 8 7   Average 8 7     3 Pt Pinch 3/15/2023 3/15/2023   Trials R L   1  2  3 8 7   Average 8 7     Palpation   Pain Report:  - none    + mild    ++ moderate    +++ severe    3/15/2023 4/19/2023   CMC Joint Line R: ++  L: - R: -   Thenar Eminence R: -  L: -    Web Space R: +  L: + R: -  L: -   1st DC R: ++  L: + R: +  L: -   Radial Styloid R: -  L: + L: -   FCR R: -  L: + L: -     Please refer to the daily flowsheet for treatment provided today.     Assessment:  Response to therapy has been improvement to:  ROM of Thumb:  Flex and opposition  Fingers: MP joint - Flex, PIP joint - Flex, DIP joint - Flex  Strength:     Pain:  frequency is less, intensity of pain is decreased, duration of pain is decreased and less tender over affected area    Overall Assessment:  Patient's symptoms are resolving.  Patient is becoming more independent in home exercise program  STG/LTG:  STGoals have been reviewed and progress or achievement has occurred;  see goal sheet for details and updates.  LTGoals have been reviewed and progress or achievement has occurred:  see goal sheet for details and updates.    Plan:  Patient is ready to discharge from hand therapy. They will  independently manage their symptoms and HEP.     Home Exercise Program:  Arthritis Tips/Joint Protection  Warmth  [place] Massage to Thumb  Thumb Stabilization Web Space Release Method 1 with Clip  Thumb Stabilization Strengthening Isometric C  Thumb Stabilization 1st Dorsal Interosseous  Intrinsic Strengthening Finger Adduction  Thumb Stabilization Strengthening Isometric 1st Dorsal Interosseous  Thumb stabilization 1st Dorsal Interosseous with rubber band

## 2023-04-22 DIAGNOSIS — I10 HYPERTENSION GOAL BP (BLOOD PRESSURE) < 140/90: ICD-10-CM

## 2023-04-23 RX ORDER — LOSARTAN POTASSIUM 50 MG/1
TABLET ORAL
Qty: 90 TABLET | Refills: 0 | Status: SHIPPED | OUTPATIENT
Start: 2023-04-23 | End: 2023-07-11

## 2023-06-06 ENCOUNTER — PATIENT OUTREACH (OUTPATIENT)
Dept: CARE COORDINATION | Facility: CLINIC | Age: 88
End: 2023-06-06
Payer: COMMERCIAL

## 2023-07-11 DIAGNOSIS — I10 HYPERTENSION GOAL BP (BLOOD PRESSURE) < 140/90: ICD-10-CM

## 2023-07-11 RX ORDER — LOSARTAN POTASSIUM 50 MG/1
TABLET ORAL
Qty: 90 TABLET | Refills: 0 | Status: SHIPPED | OUTPATIENT
Start: 2023-07-11 | End: 2023-08-20

## 2023-08-08 ENCOUNTER — MYC MEDICAL ADVICE (OUTPATIENT)
Dept: FAMILY MEDICINE | Facility: CLINIC | Age: 88
End: 2023-08-08
Payer: COMMERCIAL

## 2023-08-08 NOTE — TELEPHONE ENCOUNTER
Next 5 appointments (look out 90 days)      Aug 21, 2023  9:00 AM  (Arrive by 8:40 AM)  Annual Wellness Visit with Lani Valencia MD  [facility] ([facility] ) [address] [facility] [address] [phone]

## 2023-08-11 ENCOUNTER — TRANSFERRED RECORDS (OUTPATIENT)
Dept: HEALTH INFORMATION MANAGEMENT | Facility: CLINIC | Age: 88
End: 2023-08-11
Payer: COMMERCIAL

## 2023-08-20 ASSESSMENT — ENCOUNTER SYMPTOMS
PARESTHESIAS: 0
COUGH: 0
ARTHRALGIAS: 0
MYALGIAS: 0
CONSTIPATION: 0
NERVOUS/ANXIOUS: 1
HEMATURIA: 0
FEVER: 0
HEMATOCHEZIA: 0
CHILLS: 0
SHORTNESS OF BREATH: 0
WEAKNESS: 0
NAUSEA: 0
DIZZINESS: 0
ABDOMINAL PAIN: 0
DYSURIA: 0
JOINT SWELLING: 0
HEARTBURN: 0
BREAST MASS: 0
SORE THROAT: 0
DIARRHEA: 0
PALPITATIONS: 0
HEADACHES: 0
FREQUENCY: 1
EYE PAIN: 0

## 2023-08-20 ASSESSMENT — ACTIVITIES OF DAILY LIVING (ADL): CURRENT_FUNCTION: NO ASSISTANCE NEEDED

## 2023-08-21 ENCOUNTER — OFFICE VISIT (OUTPATIENT)
Dept: FAMILY MEDICINE | Facility: CLINIC | Age: 88
End: 2023-08-21
Payer: COMMERCIAL

## 2023-08-21 VITALS
OXYGEN SATURATION: 96 % | RESPIRATION RATE: 18 BRPM | HEART RATE: 72 BPM | BODY MASS INDEX: 22.68 KG/M2 | HEIGHT: 63 IN | WEIGHT: 128 LBS | SYSTOLIC BLOOD PRESSURE: 143 MMHG | DIASTOLIC BLOOD PRESSURE: 67 MMHG | TEMPERATURE: 98.7 F

## 2023-08-21 DIAGNOSIS — C50.919 MALIGNANT NEOPLASM OF FEMALE BREAST, UNSPECIFIED ESTROGEN RECEPTOR STATUS, UNSPECIFIED LATERALITY, UNSPECIFIED SITE OF BREAST (H): ICD-10-CM

## 2023-08-21 DIAGNOSIS — R60.0 PEDAL EDEMA: ICD-10-CM

## 2023-08-21 DIAGNOSIS — I10 ESSENTIAL HYPERTENSION WITH GOAL BLOOD PRESSURE LESS THAN 140/90: ICD-10-CM

## 2023-08-21 DIAGNOSIS — Z23 NEED FOR PROPHYLACTIC VACCINATION WITH COMBINED DIPHTHERIA-TETANUS-PERTUSSIS (DTP) VACCINE: ICD-10-CM

## 2023-08-21 DIAGNOSIS — M85.80 OSTEOPENIA, UNSPECIFIED LOCATION: ICD-10-CM

## 2023-08-21 DIAGNOSIS — Z00.00 ENCOUNTER FOR MEDICARE ANNUAL WELLNESS EXAM: Primary | ICD-10-CM

## 2023-08-21 LAB
ALBUMIN SERPL BCG-MCNC: 4.6 G/DL (ref 3.5–5.2)
ALP SERPL-CCNC: 92 U/L (ref 35–104)
ALT SERPL W P-5'-P-CCNC: 7 U/L (ref 0–50)
ANION GAP SERPL CALCULATED.3IONS-SCNC: 12 MMOL/L (ref 7–15)
AST SERPL W P-5'-P-CCNC: 25 U/L (ref 0–45)
BILIRUB SERPL-MCNC: 0.9 MG/DL
BUN SERPL-MCNC: 10.5 MG/DL (ref 8–23)
CALCIUM SERPL-MCNC: 9.1 MG/DL (ref 8.8–10.2)
CHLORIDE SERPL-SCNC: 104 MMOL/L (ref 98–107)
CHOLEST SERPL-MCNC: 214 MG/DL
CREAT SERPL-MCNC: 0.46 MG/DL (ref 0.51–0.95)
DEPRECATED HCO3 PLAS-SCNC: 25 MMOL/L (ref 22–29)
ERYTHROCYTE [DISTWIDTH] IN BLOOD BY AUTOMATED COUNT: 13.2 % (ref 10–15)
GFR SERPL CREATININE-BSD FRML MDRD: >90 ML/MIN/1.73M2
GLUCOSE SERPL-MCNC: 96 MG/DL (ref 70–99)
HCT VFR BLD AUTO: 40.4 % (ref 35–47)
HDLC SERPL-MCNC: 79 MG/DL
HGB BLD-MCNC: 13.5 G/DL (ref 11.7–15.7)
LDLC SERPL CALC-MCNC: 121 MG/DL
MCH RBC QN AUTO: 29.2 PG (ref 26.5–33)
MCHC RBC AUTO-ENTMCNC: 33.4 G/DL (ref 31.5–36.5)
MCV RBC AUTO: 87 FL (ref 78–100)
NONHDLC SERPL-MCNC: 135 MG/DL
PLATELET # BLD AUTO: 335 10E3/UL (ref 150–450)
POTASSIUM SERPL-SCNC: 3.9 MMOL/L (ref 3.4–5.3)
PROT SERPL-MCNC: 7.7 G/DL (ref 6.4–8.3)
RBC # BLD AUTO: 4.62 10E6/UL (ref 3.8–5.2)
SODIUM SERPL-SCNC: 141 MMOL/L (ref 136–145)
TRIGL SERPL-MCNC: 69 MG/DL
WBC # BLD AUTO: 6.3 10E3/UL (ref 4–11)

## 2023-08-21 PROCEDURE — G0439 PPPS, SUBSEQ VISIT: HCPCS | Performed by: FAMILY MEDICINE

## 2023-08-21 PROCEDURE — 85027 COMPLETE CBC AUTOMATED: CPT | Performed by: FAMILY MEDICINE

## 2023-08-21 PROCEDURE — 36415 COLL VENOUS BLD VENIPUNCTURE: CPT | Performed by: FAMILY MEDICINE

## 2023-08-21 PROCEDURE — 99213 OFFICE O/P EST LOW 20 MIN: CPT | Mod: 25 | Performed by: FAMILY MEDICINE

## 2023-08-21 PROCEDURE — 80061 LIPID PANEL: CPT | Performed by: FAMILY MEDICINE

## 2023-08-21 PROCEDURE — 80053 COMPREHEN METABOLIC PANEL: CPT | Performed by: FAMILY MEDICINE

## 2023-08-21 RX ORDER — AMLODIPINE BESYLATE 5 MG/1
TABLET ORAL
Qty: 90 TABLET | Refills: 3 | Status: SHIPPED | OUTPATIENT
Start: 2023-08-21 | End: 2024-08-15

## 2023-08-21 RX ORDER — LOSARTAN POTASSIUM 50 MG/1
50 TABLET ORAL DAILY
Qty: 90 TABLET | Refills: 3 | Status: SHIPPED | OUTPATIENT
Start: 2023-08-21 | End: 2023-10-01

## 2023-08-21 ASSESSMENT — ENCOUNTER SYMPTOMS
SORE THROAT: 0
NERVOUS/ANXIOUS: 1
HEADACHES: 0
PARESTHESIAS: 0
HEMATOCHEZIA: 0
NAUSEA: 0
BREAST MASS: 0
FEVER: 0
HEARTBURN: 0
COUGH: 0
FREQUENCY: 1
MYALGIAS: 0
DYSURIA: 0
HEMATURIA: 0
PALPITATIONS: 0
ABDOMINAL PAIN: 0
SHORTNESS OF BREATH: 0
CONSTIPATION: 0
DIZZINESS: 0
DIARRHEA: 0
JOINT SWELLING: 0
ARTHRALGIAS: 0
CHILLS: 0
WEAKNESS: 0
EYE PAIN: 0

## 2023-08-21 ASSESSMENT — ACTIVITIES OF DAILY LIVING (ADL): CURRENT_FUNCTION: NO ASSISTANCE NEEDED

## 2023-08-21 ASSESSMENT — PAIN SCALES - GENERAL: PAINLEVEL: NO PAIN (0)

## 2023-08-21 NOTE — PATIENT INSTRUCTIONS
Patient Education   Personalized Prevention Plan  You are due for the preventive services outlined below.  Your care team is available to assist you in scheduling these services.  If you have already completed any of these items, please share that information with your care team to update in your medical record.  Health Maintenance Due   Topic Date Due     ANNUAL REVIEW OF HM ORDERS  Never done     Diptheria Tetanus Pertussis (DTAP/TDAP/TD) Vaccine (1 - Tdap) 07/22/2014     Osteoporosis Screening  12/20/2021     COVID-19 Vaccine (6 - Pfizer series) 02/07/2023     Annual Wellness Visit  07/06/2023

## 2023-08-21 NOTE — PROGRESS NOTES
"SUBJECTIVE:   Kate is a 87 year old who presents for Preventive Visit.      8/21/2023     9:16 AM   Additional Questions   Roomed by jeniffer       Are you in the first 12 months of your Medicare coverage?  No    Healthy Habits:     In general, how would you rate your overall health?  Good    Frequency of exercise:  4-5 days/week    Duration of exercise:  30-45 minutes    Do you usually eat at least 4 servings of fruit and vegetables a day, include whole grains    & fiber and avoid regularly eating high fat or \"junk\" foods?  Yes    Taking medications regularly:  Yes    Medication side effects:  None    Ability to successfully perform activities of daily living:  No assistance needed    Home Safety:  No safety concerns identified    Hearing Impairment:  Difficulty following a conversation in a noisy restaurant or crowded room, need to ask people to speak up or repeat themselves and difficulty understanding soft or whispered speech    In the past 6 months, have you been bothered by leaking of urine? Yes    In general, how would you rate your overall mental or emotional health?  Good    Additional concerns today:  Yes    Slight pedal edema this summer    Had some kaleidoscope type vision a couple of times  Saw the eye doctor.   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   No other symptoma    Have you ever done Advance Care Planning? (For example, a Health Directive, POLST, or a discussion with a medical provider or your loved ones about your wishes): Yes, advance care planning is on file.      Fall risk  Fallen 2 or more times in the past year?: No  Any fall with injury in the past year?: No    Cognitive Screening no concerns based on direct observation    Concerns with memory such as leaving a light on  Will forget why she went into a room      Do you have sleep apnea, excessive snoring or daytime drowsiness? : no    Reviewed and updated as needed this visit by clinical staff   Tobacco  Allergies  Meds          "     Reviewed and updated as needed this visit by Provider                 Social History     Tobacco Use    Smoking status: Former     Packs/day: 1.00     Years: 32.00     Pack years: 32.00     Types: Cigarettes     Start date: 1956     Quit date: 1985     Years since quittin.6    Smokeless tobacco: Never    Tobacco comments:     I stopped smoking several times from  to    Substance Use Topics    Alcohol use: Yes     Alcohol/week: 2.5 - 3.3 standard drinks of alcohol     Comment: I enjoy a glass of red wine in the evening.             2023     3:03 PM   Alcohol Use   Prescreen: >3 drinks/day or >7 drinks/week? No          No data to display              Do you have a current opioid prescription? No  Do you use any other controlled substances or medications that are not prescribed by a provider? None              Current providers sharing in care for this patient include:   Patient Care Team:  Lani Valencia MD as PCP - General  Lani Valencia MD as Assigned PCP  Denia Shultz MD as Assigned Surgical Provider    The following health maintenance items are reviewed in Epic and correct as of today:  Health Maintenance   Topic Date Due    ANNUAL REVIEW OF HM ORDERS  Never done    DTAP/TDAP/TD IMMUNIZATION (1 - Tdap) 2014    DEXA  2021    COVID-19 Vaccine (6 - Pfizer series) 2023    MEDICARE ANNUAL WELLNESS VISIT  2023    INFLUENZA VACCINE (1) 2023    FALL RISK ASSESSMENT  2024    ADVANCE CARE PLANNING  2027    PHQ-2 (once per calendar year)  Completed    Pneumococcal Vaccine: 65+ Years  Completed    ZOSTER IMMUNIZATION  Completed    IPV IMMUNIZATION  Aged Out    MENINGITIS IMMUNIZATION  Aged Out    MAMMO SCREENING  Discontinued    COLORECTAL CANCER SCREENING  Discontinued     Lab work is in process  Mammogram Screening: Mammogram Screening - Patient over age 75, has elected to continue with screening.    Mammogram Screening -  "Patient over age 75, has elected to continue with screening.  Pertinent mammograms are reviewed under the imaging tab.    Review of Systems   Constitutional:  Negative for chills and fever.   HENT:  Negative for congestion, ear pain, hearing loss and sore throat.    Eyes:  Negative for pain and visual disturbance.   Respiratory:  Negative for cough and shortness of breath.    Cardiovascular:  Positive for peripheral edema. Negative for chest pain and palpitations.   Gastrointestinal:  Negative for abdominal pain, constipation, diarrhea, heartburn, hematochezia and nausea.   Breasts:  Negative for tenderness, breast mass and discharge.   Genitourinary:  Positive for frequency. Negative for dysuria, genital sores, hematuria, pelvic pain, urgency, vaginal bleeding and vaginal discharge.   Musculoskeletal:  Negative for arthralgias, joint swelling and myalgias.   Skin:  Negative for rash.   Neurological:  Negative for dizziness, weakness, headaches and paresthesias.   Psychiatric/Behavioral:  Negative for mood changes. The patient is nervous/anxious.      Constitutional, HEENT, cardiovascular, pulmonary, gi and gu systems are negative, except as otherwise noted.    OBJECTIVE:   BP (!) 148/84   Pulse 79   Temp 98.7  F (37.1  C) (Oral)   Resp 18   Ht 1.6 m (5' 3\")   Wt 58.1 kg (128 lb)   SpO2 96%   BMI 22.67 kg/m   Estimated body mass index is 22.67 kg/m  as calculated from the following:    Height as of this encounter: 1.6 m (5' 3\").    Weight as of this encounter: 58.1 kg (128 lb).  Physical Exam  GENERAL: healthy, alert and no distress  EYES: Eyes grossly normal to inspection, PERRL and conjunctivae and sclerae normal  HENT: ear canals and TM's normal, nose and mouth without ulcers or lesions  NECK: no adenopathy, no asymmetry, masses, or scars and thyroid normal to palpation  RESP: lungs clear to auscultation - no rales, rhonchi or wheezes  CV: regular rate and rhythm, normal S1 S2, no S3 or S4, no murmur, " click or rub, no peripheral edema and peripheral pulses strong  ABDOMEN: soft, nontender, no hepatosplenomegaly, no masses and bowel sounds normal  MS: no gross musculoskeletal defects noted, no edema  SKIN: no suspicious lesions or rashes  NEURO: Normal strength and tone, mentation intact and speech normal  PSYCH: mentation appears normal, affect normal/bright    Diagnostic Test Results:  Labs reviewed in Epic  Results for orders placed or performed in visit on 08/21/23 (from the past 24 hour(s))   **CBC with platelets FUTURE 2mo   Result Value Ref Range    WBC Count 6.3 4.0 - 11.0 10e3/uL    RBC Count 4.62 3.80 - 5.20 10e6/uL    Hemoglobin 13.5 11.7 - 15.7 g/dL    Hematocrit 40.4 35.0 - 47.0 %    MCV 87 78 - 100 fL    MCH 29.2 26.5 - 33.0 pg    MCHC 33.4 31.5 - 36.5 g/dL    RDW 13.2 10.0 - 15.0 %    Platelet Count 335 150 - 450 10e3/uL       ASSESSMENT / PLAN:   (Z00.00) Encounter for Medicare annual wellness exam  (primary encounter diagnosis)  Comment:   Plan:     (I10) Essential hypertension with goal blood pressure less than 140/90  Comment: at goal on meds, per home BP readings  Plan: losartan (COZAAR) 50 MG tablet, Lipid panel         reflex to direct LDL Fasting, **CBC with         platelets FUTURE 2mo, amLODIPine (NORVASC) 5 MG        tablet            (R60.0) Pedal edema  Comment: most likely side effect of hot summer and norvasc.  Will check labs. Exam shows minimal edema  Plan: **CBC with platelets FUTURE 2mo,         **Comprehensive metabolic panel FUTURE 2mo            (C50.919) Malignant neoplasm of female breast, unspecified estrogen receptor status, unspecified laterality, unspecified site of breast (H)  Comment: getting yearly mammograms  Plan:     (M85.80) Osteopenia  Comment: last one completed 2 years ago was normal  Plan:     (Z23) Need for prophylactic vaccination with combined diphtheria-tetanus-pertussis (DTP) vaccine  Comment: she will check at pharmacy for coverage  Plan:     Patient has  been advised of split billing requirements and indicates understanding: Yes      COUNSELING:  Reviewed preventive health counseling, as reflected in patient instructions       Regular exercise       Healthy diet/nutrition       Vision screening       Dental care       Osteoporosis prevention/bone health        She reports that she quit smoking about 38 years ago. Her smoking use included cigarettes. She started smoking about 67 years ago. She has a 32.00 pack-year smoking history. She has never used smokeless tobacco.      Appropriate preventive services were discussed with this patient, including applicable screening as appropriate for cardiovascular disease, diabetes, osteopenia/osteoporosis, and glaucoma.  As appropriate for age/gender, discussed screening for colorectal cancer, prostate cancer, breast cancer, and cervical cancer. Checklist reviewing preventive services available has been given to the patient.    Reviewed patients plan of care and provided an AVS. The Intermediate Care Plan ( asthma action plan, low back pain action plan, and migraine action plan) for Kate meets the Care Plan requirement. This Care Plan has been established and reviewed with the Patient.          Lani Valencia MD  [facility]    Identified Health Risks:  I have reviewed Opioid Use Disorder and Substance Use Disorder risk factors and made any needed referrals.

## 2023-08-24 ENCOUNTER — MYC MEDICAL ADVICE (OUTPATIENT)
Dept: FAMILY MEDICINE | Facility: CLINIC | Age: 88
End: 2023-08-24
Payer: COMMERCIAL

## 2023-09-04 ENCOUNTER — MYC MEDICAL ADVICE (OUTPATIENT)
Dept: FAMILY MEDICINE | Facility: CLINIC | Age: 88
End: 2023-09-04
Payer: COMMERCIAL

## 2023-09-04 DIAGNOSIS — Z96.653 STATUS POST BILATERAL KNEE REPLACEMENTS: ICD-10-CM

## 2023-09-06 RX ORDER — AMOXICILLIN 500 MG/1
2000 CAPSULE ORAL ONCE
Qty: 4 CAPSULE | Refills: 4 | Status: SHIPPED | OUTPATIENT
Start: 2023-09-06 | End: 2024-08-26

## 2023-09-23 DIAGNOSIS — N95.2 ATROPHIC VAGINITIS: ICD-10-CM

## 2023-09-26 ENCOUNTER — TELEPHONE (OUTPATIENT)
Dept: FAMILY MEDICINE | Facility: CLINIC | Age: 88
End: 2023-09-26
Payer: COMMERCIAL

## 2023-09-26 ENCOUNTER — TELEPHONE (OUTPATIENT)
Dept: UROLOGY | Facility: CLINIC | Age: 88
End: 2023-09-26
Payer: COMMERCIAL

## 2023-09-26 RX ORDER — ESTRADIOL 0.1 MG/G
CREAM VAGINAL
Qty: 42.5 G | Refills: 0 | Status: SHIPPED | OUTPATIENT
Start: 2023-09-28 | End: 2024-09-01

## 2023-09-26 NOTE — TELEPHONE ENCOUNTER
Pt advised she no longer needs to follow up with Dr. Shultz but if anything changes she will contact us. Pt no longer needs refill either

## 2023-09-26 NOTE — TELEPHONE ENCOUNTER
Patient Quality Outreach    Patient is due for the following:   Hypertension -  BP check    Next Steps:   Schedule a nurse only visit for blood pressure     Type of outreach:    Phone, left message for patient/parent to call back.      Questions for provider review:    None           Alanna Silva MA

## 2023-09-26 NOTE — TELEPHONE ENCOUNTER
Last Clinic Visit: 10-    Per Primary care note date of elevated BP:  (I10) Essential hypertension with goal blood pressure less than 140/90  Comment: at goal on meds, per home BP readings

## 2023-09-30 DIAGNOSIS — I10 ESSENTIAL HYPERTENSION WITH GOAL BLOOD PRESSURE LESS THAN 140/90: ICD-10-CM

## 2023-10-01 RX ORDER — LOSARTAN POTASSIUM 50 MG/1
50 TABLET ORAL DAILY
Qty: 90 TABLET | Refills: 3 | Status: SHIPPED | OUTPATIENT
Start: 2023-10-01 | End: 2024-08-15

## 2024-01-09 ENCOUNTER — OFFICE VISIT (OUTPATIENT)
Dept: ORTHOPEDICS | Facility: CLINIC | Age: 89
End: 2024-01-09
Payer: COMMERCIAL

## 2024-01-09 DIAGNOSIS — M25.511 RIGHT SHOULDER PAIN, UNSPECIFIED CHRONICITY: Primary | ICD-10-CM

## 2024-01-09 PROCEDURE — 99213 OFFICE O/P EST LOW 20 MIN: CPT | Performed by: FAMILY MEDICINE

## 2024-01-09 ASSESSMENT — PAIN SCALES - GENERAL: PAINLEVEL: NO PAIN (0)

## 2024-01-09 NOTE — LETTER
1/9/2024         RE: Kate Isaac  [address] [id number]        Dear Colleague,    Thank you for referring your patient, Kate Isaac, to the [facility] [place]. Please see a copy of my visit note below.    HISTORY OF PRESENT ILLNESS  Ms. Isaac is a pleasant 88 year old female following up with right shoulder pain.  Kate last saw me in March 2023 for her right shoulder.  At that time we discussed continuing her exercise program and other conservative care.  Unfortunately her shoulder is bothering her quite a bit, she was helping her  up off of the floor when she felt an instant tug in her shoulder.     PHYSICAL EXAM  General  - normal appearance, in no obvious distress  Musculoskeletal - right shoulder  - inspection: normal bone and joint alignment, no obvious deformity, no scapular winging, no AC step-off  - palpation: no bony or soft tissue tenderness, normal clavicle, non-tender AC  - ROM:  180 deg flexion   45 deg extension   150 deg abduction   90 deg ER   70 deg IR  - strength: 5/5  strength, 5/5 in all shoulder planes  - special tests:  (-) Speed's  (-) Neer  (-) Hawkin's  (-) Иван  (-) Caroline's  Some use of accessory muscles throughout exam  Neuro  - no sensory or motor deficit, grossly normal coordination, normal muscle tone          ASSESSMENT & PLAN  Ms. Isaac is a 88 year old female following up with right shoulder pain.    I had a good discussion with Kate centered around her symptoms.  Her exam is reassuring, which I do believe has a good deal to do with her pre-existing strength and range of motion.  We did discuss that given the lack of worrisome findings on clinical exam today it would be absolutely reasonable to continue physical therapy, which she is interested in doing.  We also discussed that if her symptoms worsen or are otherwise quite bothersome we could consider a corticosteroid injection, we could also consider  MRI imaging if need be.    It was a pleasure seeing Kate.        aJm Lemons DO, CAQSM      This note was constructed using Dragon dictation software, please excuse any minor errors in spelling, grammar, or syntax.      Again, thank you for allowing me to participate in the care of your patient.        Sincerely,        Jam Lemons DO

## 2024-01-09 NOTE — NURSING NOTE
Chief Complaint   Patient presents with    Right Shoulder - Follow Up, Pain     Different pain from helping  up off the floor in sept.       There were no vitals filed for this visit.    There is no height or weight on file to calculate BMI.      TEREZA Marquez NREMT

## 2024-01-09 NOTE — PROGRESS NOTES
HISTORY OF PRESENT ILLNESS  Ms. Isaac is a pleasant 88 year old female following up with right shoulder pain.  Kate last saw me in March 2023 for her right shoulder.  At that time we discussed continuing her exercise program and other conservative care.  Unfortunately her shoulder is bothering her quite a bit, she was helping her  up off of the floor when she felt an instant tug in her shoulder.     PHYSICAL EXAM  General  - normal appearance, in no obvious distress  Musculoskeletal - right shoulder  - inspection: normal bone and joint alignment, no obvious deformity, no scapular winging, no AC step-off  - palpation: no bony or soft tissue tenderness, normal clavicle, non-tender AC  - ROM:  180 deg flexion   45 deg extension   150 deg abduction   90 deg ER   70 deg IR  - strength: 5/5  strength, 5/5 in all shoulder planes  - special tests:  (-) Speed's  (-) Neer  (-) Hawkin's  (-) Иван  (-) Kimball's  Some use of accessory muscles throughout exam  Neuro  - no sensory or motor deficit, grossly normal coordination, normal muscle tone          ASSESSMENT & PLAN  Ms. Isaac is a 88 year old female following up with right shoulder pain.    I had a good discussion with Kate centered around her symptoms.  Her exam is reassuring, which I do believe has a good deal to do with her pre-existing strength and range of motion.  We did discuss that given the lack of worrisome findings on clinical exam today it would be absolutely reasonable to continue physical therapy, which she is interested in doing.  We also discussed that if her symptoms worsen or are otherwise quite bothersome we could consider a corticosteroid injection, we could also consider MRI imaging if need be.    It was a pleasure seeing Kate.        Jam Lemons DO, CAJORGE      This note was constructed using Dragon dictation software, please excuse any minor errors in spelling, grammar, or syntax.

## 2024-01-30 DIAGNOSIS — I10 ESSENTIAL HYPERTENSION WITH GOAL BLOOD PRESSURE LESS THAN 140/90: ICD-10-CM

## 2024-01-30 RX ORDER — AMLODIPINE BESYLATE 5 MG/1
TABLET ORAL
Qty: 90 TABLET | Refills: 3 | OUTPATIENT
Start: 2024-01-30

## 2024-03-25 ENCOUNTER — TRANSFERRED RECORDS (OUTPATIENT)
Dept: HEALTH INFORMATION MANAGEMENT | Facility: CLINIC | Age: 89
End: 2024-03-25

## 2024-03-25 ENCOUNTER — OFFICE VISIT (OUTPATIENT)
Dept: FAMILY MEDICINE | Facility: CLINIC | Age: 89
End: 2024-03-25
Payer: COMMERCIAL

## 2024-03-25 VITALS
RESPIRATION RATE: 17 BRPM | HEART RATE: 95 BPM | TEMPERATURE: 98.1 F | BODY MASS INDEX: 22.32 KG/M2 | SYSTOLIC BLOOD PRESSURE: 160 MMHG | OXYGEN SATURATION: 99 % | DIASTOLIC BLOOD PRESSURE: 80 MMHG | WEIGHT: 126 LBS | HEIGHT: 63 IN

## 2024-03-25 DIAGNOSIS — M79.642 BILATERAL HAND PAIN: Primary | ICD-10-CM

## 2024-03-25 DIAGNOSIS — C50.919 MALIGNANT NEOPLASM OF FEMALE BREAST, UNSPECIFIED ESTROGEN RECEPTOR STATUS, UNSPECIFIED LATERALITY, UNSPECIFIED SITE OF BREAST (H): ICD-10-CM

## 2024-03-25 DIAGNOSIS — M79.641 BILATERAL HAND PAIN: Primary | ICD-10-CM

## 2024-03-25 DIAGNOSIS — I10 ESSENTIAL HYPERTENSION WITH GOAL BLOOD PRESSURE LESS THAN 140/90: ICD-10-CM

## 2024-03-25 PROCEDURE — 99213 OFFICE O/P EST LOW 20 MIN: CPT | Performed by: FAMILY MEDICINE

## 2024-03-25 ASSESSMENT — PAIN SCALES - GENERAL: PAINLEVEL: MODERATE PAIN (5)

## 2024-03-25 NOTE — PROGRESS NOTES
"  Assessment & Plan     Bilateral hand pain  Will use tylenol 1000 mg tid as needed  Has otc voltaren get from [facility] which she is also using as needed    Malignant neoplasm of female breast, unspecified estrogen receptor status, unspecified laterality, unspecified site of breast (H)  Regular mammograms    (I10) Essential hypertension with goal blood pressure less than 140/90  Comment: elevated here but normal at home  Plan: she checks regularly                  Subjective   Kate is a 88 year old, presenting for the following health issues:  Hand Pain      3/25/2024    11:11 AM   Additional Questions   Roomed by jeniffer     Hand Pain    History of Present Illness       Reason for visit:  Arthritis in my hands    She eats 2-3 servings of fruits and vegetables daily.She consumes 0 sweetened beverage(s) daily.She exercises with enough effort to increase her heart rate 30 to 60 minutes per day.  She exercises with enough effort to increase her heart rate 4 days per week.   She is taking medications regularly.     Lots of anxiety with husbands medical conditions  Is doing yoga   Is taking vitamin D supplement.  Walks on treadmill 3-4 times per week        Review of Systems  Constitutional, HEENT, cardiovascular, pulmonary, gi and gu systems are negative, except as otherwise noted.      Objective    BP (!) 180/97   Pulse 95   Temp 98.1  F (36.7  C) (Oral)   Resp 17   Ht 1.594 m (5' 2.75\")   Wt 57.2 kg (126 lb)   SpO2 99%   BMI 22.50 kg/m    Body mass index is 22.5 kg/m .  Physical Exam   GENERAL: alert and no distress  MS: no gross musculoskeletal defects noted, no edema, arthritic changes noted in both hands    No results found for this or any previous visit (from the past 24 hour(s)).        Signed Electronically by: Lani Valencia MD    "

## 2024-06-10 ENCOUNTER — MYC MEDICAL ADVICE (OUTPATIENT)
Dept: FAMILY MEDICINE | Facility: CLINIC | Age: 89
End: 2024-06-10
Payer: COMMERCIAL

## 2024-06-26 ENCOUNTER — MYC MEDICAL ADVICE (OUTPATIENT)
Dept: FAMILY MEDICINE | Facility: CLINIC | Age: 89
End: 2024-06-26

## 2024-06-26 ENCOUNTER — OFFICE VISIT (OUTPATIENT)
Dept: FAMILY MEDICINE | Facility: CLINIC | Age: 89
End: 2024-06-26
Payer: COMMERCIAL

## 2024-06-26 VITALS
HEIGHT: 63 IN | HEART RATE: 56 BPM | DIASTOLIC BLOOD PRESSURE: 86 MMHG | OXYGEN SATURATION: 92 % | TEMPERATURE: 98.1 F | SYSTOLIC BLOOD PRESSURE: 151 MMHG | WEIGHT: 127 LBS | BODY MASS INDEX: 22.5 KG/M2 | RESPIRATION RATE: 17 BRPM

## 2024-06-26 DIAGNOSIS — I10 ESSENTIAL HYPERTENSION WITH GOAL BLOOD PRESSURE LESS THAN 140/90: ICD-10-CM

## 2024-06-26 DIAGNOSIS — I89.0 LYMPHEDEMA OF RIGHT ARM: Primary | ICD-10-CM

## 2024-06-26 DIAGNOSIS — C50.919 MALIGNANT NEOPLASM OF FEMALE BREAST, UNSPECIFIED ESTROGEN RECEPTOR STATUS, UNSPECIFIED LATERALITY, UNSPECIFIED SITE OF BREAST (H): ICD-10-CM

## 2024-06-26 PROCEDURE — G2211 COMPLEX E/M VISIT ADD ON: HCPCS | Performed by: FAMILY MEDICINE

## 2024-06-26 PROCEDURE — 99214 OFFICE O/P EST MOD 30 MIN: CPT | Performed by: FAMILY MEDICINE

## 2024-06-26 RX ORDER — AMLODIPINE BESYLATE 2.5 MG/1
2.5 TABLET ORAL DAILY
Qty: 90 TABLET | Refills: 1 | Status: SHIPPED | OUTPATIENT
Start: 2024-06-26 | End: 2024-06-27

## 2024-06-26 RX ORDER — LOSARTAN POTASSIUM 100 MG/1
100 TABLET ORAL DAILY
Qty: 90 TABLET | Refills: 1 | Status: SHIPPED | OUTPATIENT
Start: 2024-06-26 | End: 2024-06-27

## 2024-06-26 ASSESSMENT — PAIN SCALES - GENERAL: PAINLEVEL: NO PAIN (0)

## 2024-06-26 NOTE — PROGRESS NOTES
Assessment & Plan     Lymphedema of right arm   used to do the manual therapy but he has passed away  She would like to return to PT for this  - Physical Therapy  Referral; Future    Essential hypertension with goal blood pressure less than 140/90  At goal on meds per home BP readings  Since having increased pedal edema, will decrease norvasc dose and increase losartan dose  Follow-up in August for awv  Follow-up sooner if this does not take care of the issue  - losartan (COZAAR) 100 MG tablet; Take 1 tablet (100 mg) by mouth daily  - amLODIPine (NORVASC) 2.5 MG tablet; Take 1 tablet (2.5 mg) by mouth daily    Malignant neoplasm of female breast, unspecified estrogen receptor status, unspecified laterality, unspecified site of breast (H)  Yearly mammograms, caused the edema    The longitudinal plan of care for the diagnosis(es)/condition(s) as documented were addressed during this visit. Due to the added complexity in care, I will continue to support Kate in the subsequent management and with ongoing continuity of care.              Allegra Love is a 88 year old, presenting for the following health issues:  Edema      6/26/2024     2:19 PM   Additional Questions   Roomed by jeniffer     History of Present Illness       Reason for visit:  Swelling in my right arm and swelling in my lower legs    She eats 2-3 servings of fruits and vegetables daily.She consumes 0 sweetened beverage(s) daily.She exercises with enough effort to increase her heart rate 10 to 19 minutes per day.  She exercises with enough effort to increase her heart rate 4 days per week.   She is taking medications regularly.     Notes increase in edema of right arm since  passed away and no one to do the manual therapy  She has noted mild increase in pedal edema over the past year  No chest pain/sob or carrasco. Most likely side effect of norvasc  Will decrease dose of norvasc, increase dose of losartan  Pt to monitor sxs and  "BP/pulse            Review of Systems  Constitutional, HEENT, cardiovascular, pulmonary, gi and gu systems are negative, except as otherwise noted.      Objective    BP (!) 151/86   Pulse 56   Temp 98.1  F (36.7  C) (Oral)   Resp 17   Ht 1.594 m (5' 2.75\")   Wt 57.6 kg (127 lb)   SpO2 92%   BMI 22.68 kg/m    Body mass index is 22.68 kg/m .  Physical Exam   GENERAL: alert and no distress  MS: mild pedal edema to just above ankles  Right arm in compression sleeve    No results found for this or any previous visit (from the past 24 hour(s)).        Signed Electronically by: Lani Valencia MD    "

## 2024-06-27 RX ORDER — LOSARTAN POTASSIUM 100 MG/1
100 TABLET ORAL DAILY
Qty: 90 TABLET | Refills: 1 | Status: SHIPPED | OUTPATIENT
Start: 2024-06-27 | End: 2024-08-26

## 2024-06-27 RX ORDER — AMLODIPINE BESYLATE 2.5 MG/1
2.5 TABLET ORAL DAILY
Qty: 90 TABLET | Refills: 1 | Status: SHIPPED | OUTPATIENT
Start: 2024-06-27 | End: 2024-08-26

## 2024-07-12 ENCOUNTER — PATIENT OUTREACH (OUTPATIENT)
Dept: CARE COORDINATION | Facility: CLINIC | Age: 89
End: 2024-07-12
Payer: COMMERCIAL

## 2024-07-23 ENCOUNTER — THERAPY VISIT (OUTPATIENT)
Dept: PHYSICAL THERAPY | Facility: CLINIC | Age: 89
End: 2024-07-23
Attending: FAMILY MEDICINE
Payer: COMMERCIAL

## 2024-07-23 DIAGNOSIS — I89.0 LYMPHEDEMA OF RIGHT ARM: ICD-10-CM

## 2024-07-23 PROCEDURE — 97161 PT EVAL LOW COMPLEX 20 MIN: CPT | Mod: GP | Performed by: PHYSICAL THERAPIST

## 2024-07-23 PROCEDURE — 97140 MANUAL THERAPY 1/> REGIONS: CPT | Mod: GP | Performed by: PHYSICAL THERAPIST

## 2024-07-23 NOTE — PROGRESS NOTES
07/23/24 0500   Signing Clinician's Name / Credentials   Signing clinician's name / credentials Zaki Herrmann, PT, CLT   Discipline   Discipline PT   Upper Extremity Girth Measurements   Cuticle to SW 18 cms   RT: Thumb 8 cms   RT: Index 6.2 cms   RT: Middle 6 cms   RT: Ring 5.1   RT: Little 5 cms   RT: MCP's 18.6 cms   RT: Smallest Wrist (SW), (distal to ulnar styloid) 13.9 cms   RT: 8 cm above SW 16.9   RT: 16 cm above SW 23 cms   RT: 24 cm above SW 24.7 cms   RT: 32 cm above SW 29 cms   RT: 40 cm above SW 30.3 cms   RT: Upper Extremity Comments Seated   RT: Upper Extremity Total Volume 1787.03   RT: Upper Extremity % of Swelling 6.2 %   LT: Thumb 8 cms   LT: Index 6 cms   LT: Middle 5.6   LT: Ring 4.8   LT: Little 4.6 cms   LT: MCP's 18 cms   LT: Smallest Wrist (SW), (distal to ulnar styloid) 13.9   LT: 8 cm above SW 16 cms   LT: 16 cm above SW 21.4 cms   LT: 24 cm above SW 23.4 cms   LT: 32 cm above SW 28.6 cms   LT: 40 cm above SW 31 cms   LT: Upper Extremity Comments Seated   LT: Upper Extremity Total Volume 1682.13

## 2024-07-23 NOTE — PROGRESS NOTES
PHYSICAL THERAPY EVALUATION  Type of Visit: Evaluation       Fall Risk Screen:  Fall screen completed by: PT  Have you fallen 2 or more times in the past year?: No  Have you fallen and had an injury in the past year?: No  Is patient a fall risk?: No    Subjective       Presenting condition or subjective complaint: As a result of breast cancer, I developed swelling in my right arm in 2017.   I had PT and have been doing massage but would like to know if there are any changes I should make.  My  always did some back massaging but he has passed away.  Pt is an 89 yo female who presents to PT with RUE lymphedema secondary to breast cancer with lymph node removal. Pt has been managing her RUE lymphedema with self MLD, had been receiving assist from her  until he recently passed away. Pt has also been wearing a compression sleeve consistently since her dx, has been updating her sleeve regularly. Pt is wanting updated instruction in MLD without a partner as well as an updated order for compression sleeve.  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  Breast Cancer-2001  Ganglion cyst -2001 Bunion-2001 Baselcell skin cancers-1996,2004,2010 Moh's 2017,2019 Left knee replacement 2020 Right knee 2021 Cataract right & left eyes 2021,    Prior diagnostic imaging/testing results:       Prior therapy history for the same diagnosis, illness or injury: Yes 2017 - a variety of massages to move the lymph liquids    Prior Level of Function  Transfers:   Ambulation:   ADL:   IADL:     Living Environment  Social support: Alone   Type of home: House; Multi-level; Basement   Stairs to enter the home: Yes 1 Is there a railing: No     Ramp: No   Stairs inside the home: Yes 14 Is there a railing: Yes     Help at home: None  Equipment owned: Straight Cane     Employment: No    Hobbies/Interests: Golf, Bridge, Mahjong, Walking    Patient goals  for therapy: There is nothing I need help doing.    Pain assessment:      Objective       EDEMA EVALUATION  Additional history:  Body part affected by edema: Right arm  If cancer related, treatment: All lymph nodes were removed in 1996 when I had breast cancer  If not cancer related, problems with veins or cause of swelling:    Distance able to walk: 2 miles  Time able to stand: An hour?  Sensation problems in hands/feet: No    Edema etiology: Cancer with lymph node dissection    FUNCTIONAL SCALES  Lower Extremity Functional Scale (score out of 80). A lower score indicates greater impairment:    Shoulder Pain and Disability Index (score out of 100).  A higher score indicates greater impairment:      Cognitive Status Examination  Orientation:    Level of Consciousness:   Follows Commands and Answers Questions:   Personal Safety and Judgement:   Memory:     EDEMA  Skin Condition: WNL, Intact, Pitting  Scar: No  Capillary Refill: Symmetrical  Radial Pulse:   Dorsal Pedal Pulse:   Stemmer Sign:   Ulceration:     GIRTH MEASUREMENTS: Refer to separate girth measurement flowsheet.     VOLUME UE  Right UE (mL) 1787.03   Left UE (mL) 1682.13   UE Volume Comparison RUE volume greater than LUE volume   % Difference 6.2%   VOLUME LE  Right LE (mL)    Left LE (mL)    LE Volume Comparison    % Difference    Head/Neck Volume     Trunk Volume    Genital Volume       RANGE OF MOTION: UE ROM WFL  STRENGTH:   POSTURE:   PALPATION:   ACTIVITIES OF DAILY LIVING:   BED MOBILITY:   TRANSFERS:   GAIT/LOCOMOTION:   BALANCE:   SENSATION:   VASCULAR:   COORDINATION:   MUSCLE TONE:     Assessment & Plan   CLINICAL IMPRESSIONS  Medical Diagnosis: Lymphedema of right arm (I89.0)    Treatment Diagnosis: R UE Lymphedema   Impression/Assessment:  Pt presents to PT with well managed and minimal swelling in RUE compared to LUE, performing self MLD independently but with some incorrect techniques, and has been wearing compression sleeve consistently  to manage her RUE swelling. Pt would benefit from skilled PT interventions in order to update and correct her MLD performance, yarely since she no longer has a partner to assist her, as well as assisting in measurements and ordering of new compression sleeve to manage her RUE edema and reduce her risk for negative outcomes from poorly managed swelling.    Clinical Decision Making (Complexity):  Clinical Presentation: Stable/Uncomplicated  Clinical Presentation Rationale: based on medical and personal factors listed in PT evaluation  Clinical Decision Making (Complexity): Low complexity    PLAN OF CARE  Treatment Interventions:  Interventions: Manual Therapy    Long Term Goals     PT Goal 1  Goal Identifier: Garments  Goal Description: Patient will progress to compression garment for long term management of lymphedema and will demonstrate independence with donning/doffing garments.  Goal Progress: Pt with previous garment, [employer]i [place] with silicone arm band, size II regular (eval)  Target Date: 08/20/24  PT Goal 2  Goal Identifier: MLD  Goal Description: Pt to be independent in self MLD performance utilizing proper techniques, sequencing in order to aid in mgmt of her RUE lymphedema.  Goal Progress: Pt previously performing independently with assist from  who has passed away. Pt demos some incorrect techniques at eval.  Target Date: 08/20/24      Frequency of Treatment: 1x/week  Duration of Treatment: 4 weeks    Recommended Referrals to Other Professionals:   Education Assessment:   Learner/Method: Patient  Education Comments: Patient instructed in the anatomy and physiology of lymphatic system, including etiology of lymphedema. Verbalized understanding of lymphatic system and dysfunction that can lead to lymphedema. Components of complete decongestive therapy (CDT) were discussed, including focus on MLD, no need for compression bandaging at this time, exercises, self care, with continuation of long term  kamila. Email sent to jb martell with request for garment coverage information, pt has coverage through her [employer] plan. Reviewed MLD, will plan to continue review at next session.    Risks and benefits of evaluation/treatment have been explained.   Patient/Family/caregiver agrees with Plan of Care.     Evaluation Time:     PT Eval, Low Complexity Minutes (18096): 15       Signing Clinician: Zaki Herrmann PT        [facility]                                                                                   OUTPATIENT PHYSICAL THERAPY      PLAN OF TREATMENT FOR OUTPATIENT REHABILITATION   Patient's Last Name, First Name, Kate Randhawa YOB: 1935   Provider's Name   [facility]   Medical Record No.  [id number]     Onset Date: 06/26/24  Start of Care Date: 07/23/24     Medical Diagnosis:  Lymphedema of right arm (I89.0)      PT Treatment Diagnosis:  R UE Lymphedema Plan of Treatment  Frequency/Duration: 1x/week/ 4 weeks    Certification date from 07/23/24 to 08/20/24         See note for plan of treatment details and functional goals     Zaki Herrmann, PT                         I CERTIFY THE NEED FOR THESE SERVICES FURNISHED UNDER        THIS PLAN OF TREATMENT AND WHILE UNDER MY CARE     (Physician attestation of this document indicates review and certification of the therapy plan).              Referring Provider:  Lani Valencia MD    Initial Assessment  See Epic Evaluation- Start of Care Date: 07/23/24

## 2024-07-30 ENCOUNTER — THERAPY VISIT (OUTPATIENT)
Dept: PHYSICAL THERAPY | Facility: CLINIC | Age: 89
End: 2024-07-30
Attending: FAMILY MEDICINE
Payer: COMMERCIAL

## 2024-07-30 DIAGNOSIS — I89.0 LYMPHEDEMA OF RIGHT ARM: Primary | ICD-10-CM

## 2024-07-30 PROCEDURE — 97140 MANUAL THERAPY 1/> REGIONS: CPT | Mod: GP | Performed by: PHYSICAL THERAPIST

## 2024-08-09 ENCOUNTER — PATIENT OUTREACH (OUTPATIENT)
Dept: CARE COORDINATION | Facility: CLINIC | Age: 89
End: 2024-08-09
Payer: COMMERCIAL

## 2024-08-12 ENCOUNTER — TRANSFERRED RECORDS (OUTPATIENT)
Dept: HEALTH INFORMATION MANAGEMENT | Facility: CLINIC | Age: 89
End: 2024-08-12
Payer: COMMERCIAL

## 2024-08-13 ENCOUNTER — OFFICE VISIT (OUTPATIENT)
Dept: URGENT CARE | Facility: URGENT CARE | Age: 89
End: 2024-08-13
Payer: COMMERCIAL

## 2024-08-13 ENCOUNTER — TRANSFERRED RECORDS (OUTPATIENT)
Dept: HEALTH INFORMATION MANAGEMENT | Facility: CLINIC | Age: 89
End: 2024-08-13

## 2024-08-13 VITALS
HEART RATE: 92 BPM | OXYGEN SATURATION: 98 % | WEIGHT: 126 LBS | RESPIRATION RATE: 20 BRPM | DIASTOLIC BLOOD PRESSURE: 80 MMHG | SYSTOLIC BLOOD PRESSURE: 171 MMHG | TEMPERATURE: 98 F | BODY MASS INDEX: 22.5 KG/M2

## 2024-08-13 DIAGNOSIS — I10 ESSENTIAL HYPERTENSION WITH GOAL BLOOD PRESSURE LESS THAN 140/90: ICD-10-CM

## 2024-08-13 DIAGNOSIS — I49.1 PREMATURE ATRIAL BEATS: ICD-10-CM

## 2024-08-13 DIAGNOSIS — R42 DIZZINESS: Primary | ICD-10-CM

## 2024-08-13 PROCEDURE — 99214 OFFICE O/P EST MOD 30 MIN: CPT

## 2024-08-13 PROCEDURE — 93000 ELECTROCARDIOGRAM COMPLETE: CPT

## 2024-08-13 NOTE — PROGRESS NOTES
ASSESSMENT:    (R42) Dizziness  (primary encounter diagnosis)  Plan: EKG 12-lead complete w/read - Clinics    (I49.1) Premature atrial beats    (I10) Essential hypertension with goal blood pressure less than 140/90    PLAN:    EKG shows premature atrial contractions which is new compared to EKG from 2023 which showed normal sinus rhythm.  Discussed with the patient that given the acute onset of her dizziness, her past medical history including hypertension, the EKG changes and her clinical exam today; it is advised that she proceed to an emergency department for further evaluation and treatment.  Patient acknowledged understanding of the above plan and indicated she would proceed to an emergency department for further evaluation and treatment.  Patient stable to be transported via private transportation to an emergency department for further evaluation and treatment.    The use of Dragon/PowerMic dictation services may have been used to construct the content in this note; any grammatical or spelling errors are non-intentional. Please contact the author of this note directly if you are in need of any clarification.      MIKI Meyer CNP      SUBJECTIVE:  Kate Isaac is a 88 year old  female here for evaluation of dizziness.  It has been present since after finishing nine holes of golf.   There is no prior history of these types of events.  The dizziness is described as a lightheaded feeling.   Treatments have included drinking fluids.    The patient denies recent ear infection, otorrhea, nausea, vomiting or air travel. There is no prior history of hearing loss or facial weakness. The patient has had no other neurological weakness such as slurred speech, extremity weakness or confusion.      ROS:  Negative except noted above.     OBJECTIVE:   General:  awake, alert, and cooperative.  No acute distress.  Eyes: The pupils are equal in size, reactive and extraocular muscles are intact.  Ears: Right:  The external auditory canal is clear.  The tympanic membrane is intact, clear and without effusion.  Left : The external auditory canal is clear.  The tympanic membrane is intact, clear and without effusion.  Oral/Oropharynx: The lips and oropharynx appear normal and without lesion.    Neck: Soft non-tender, no lymphadenopathy or  thyromegaly   Neuro:     Cranial nerves 2-12 appear intact. Gait is normal.    Sinuses:   The sinuses are nontender and have no overlying erythema.  Skin:         There are no facial lesions or rash noted.  Heart: no murmurs, clicks, gallops or rubs.  Irregular rhythm   Lungs: Chest is clear; no wheezes or rales.    Perris Hallpike negative, however the patient reported dizziness with turning her head to the left while lying down.

## 2024-08-13 NOTE — PATIENT INSTRUCTIONS
Given the acute onset of your dizziness, your past medical history, EKG changes and the clinical exam today; it is advised that you proceed to an emergency department for further evaluation and treatment.

## 2024-08-14 ENCOUNTER — MYC MEDICAL ADVICE (OUTPATIENT)
Dept: FAMILY MEDICINE | Facility: CLINIC | Age: 89
End: 2024-08-14
Payer: COMMERCIAL

## 2024-08-14 NOTE — TELEPHONE ENCOUNTER
"Per OV note dated 8/14/2024 from [facility] is as follows:   LENIN Isaac is a 88 y.o. female presenting with the above. At the time of my assessment, she reports significant improvement of her symptoms. She has a completely normal neurologic exam, including no nystagmus. She has a steady gait without any listing to either side. She has normal coordination including normal finger-nose-finger, normal heel shin. She was with people when this event occurred, there was never any unilateral deficit, no dysarthria, no facial droop noted by the patient or anyone else concerning for stroke. Patient describes a very \"faint and lightheaded\" feeling in her head that has been improving since she has been out of the heat and sitting down. No true syncope. No chest pain, no shortness of breath. Given very reassuring exam, overall described history, I have a low suspicion for stroke or TIA. I discussed this in detail with patient and her daughter. Initially, she had an elevated blood pressure reading here, however, this was taken on her arm with known lymphadenopathy from her cancer, when this was checked on her left arm, blood pressure is more reasonable in the 140s/150s. EKG with PVCs, but no concerning findings for acute ischemia. High-sensitivity troponins are trended per our algorithm, these are negative. Low risk per Wells for PE, no true syncope, therefore, do not think this workup indicated today. Patient was able to orally rehydrate here. I did give meclizine. On multiple reassessments, patient is doing well. She has been independently ambulatory to the bathroom throughout her time here. She continues to have no ongoing symptoms. Return precautions discussed in detail with patient and her family members, they are comfortable discharging home with close outpatient follow-up. Patient was stable at the time of discharge. We discussed the importance of close follow-up with PCP, red flag signs and symptoms, and cautions " to return to care. Additional verbal discharge instructions were given. Please see discharge summary. All questions were answered and patient was discharged in stable condition.

## 2024-08-15 ENCOUNTER — OFFICE VISIT (OUTPATIENT)
Dept: FAMILY MEDICINE | Facility: CLINIC | Age: 89
End: 2024-08-15
Payer: COMMERCIAL

## 2024-08-15 VITALS
BODY MASS INDEX: 22.5 KG/M2 | HEIGHT: 63 IN | HEART RATE: 87 BPM | OXYGEN SATURATION: 98 % | DIASTOLIC BLOOD PRESSURE: 81 MMHG | TEMPERATURE: 98.5 F | RESPIRATION RATE: 18 BRPM | WEIGHT: 127 LBS | SYSTOLIC BLOOD PRESSURE: 181 MMHG

## 2024-08-15 DIAGNOSIS — R42 LIGHTHEADEDNESS: Primary | ICD-10-CM

## 2024-08-15 DIAGNOSIS — R53.1 WEAKNESS: ICD-10-CM

## 2024-08-15 DIAGNOSIS — E78.00 ELEVATED CHOLESTEROL: ICD-10-CM

## 2024-08-15 DIAGNOSIS — I47.29 VENTRICULAR TACHYCARDIA, PAROXYSMAL (H): ICD-10-CM

## 2024-08-15 LAB
CHOLEST SERPL-MCNC: 187 MG/DL
FASTING STATUS PATIENT QL REPORTED: NO
HDLC SERPL-MCNC: 78 MG/DL
LDLC SERPL CALC-MCNC: 97 MG/DL
NONHDLC SERPL-MCNC: 109 MG/DL
TRIGL SERPL-MCNC: 60 MG/DL
TSH SERPL DL<=0.005 MIU/L-ACNC: 2.22 UIU/ML (ref 0.3–4.2)

## 2024-08-15 PROCEDURE — 99213 OFFICE O/P EST LOW 20 MIN: CPT | Performed by: FAMILY MEDICINE

## 2024-08-15 PROCEDURE — 36415 COLL VENOUS BLD VENIPUNCTURE: CPT | Performed by: FAMILY MEDICINE

## 2024-08-15 PROCEDURE — 80061 LIPID PANEL: CPT | Performed by: FAMILY MEDICINE

## 2024-08-15 PROCEDURE — 84443 ASSAY THYROID STIM HORMONE: CPT | Performed by: FAMILY MEDICINE

## 2024-08-15 ASSESSMENT — PAIN SCALES - GENERAL: PAINLEVEL: NO PAIN (0)

## 2024-08-15 NOTE — PROGRESS NOTES
"  Assessment & Plan     Lightheadedness  Will just check thyroid and monitor for a week to make sure she is not having any intermittent arrhythmias. Unlikely we will see any on the zio patch given the infrequency of this  - **TSH with free T4 reflex FUTURE 2mo; Future  - ZIO PATCH MAIL OUT; Future  - **TSH with free T4 reflex FUTURE 2mo    Weakness  Check labs  - **TSH with free T4 reflex FUTURE 2mo; Future  - ZIO PATCH MAIL OUT; Future  - **TSH with free T4 reflex FUTURE 2mo    Elevated cholesterol  Seeing me in a few weeks for cpe, will grab cholesterol today  - Lipid panel reflex to direct LDL Non-fasting; Future  - Lipid panel reflex to direct LDL Non-fasting        MED REC REQUIRED  Post Medication Reconciliation Status: discharge medications reconciled, continue medications without change        Allegra Love is a 88 year old, presenting for the following health issues:  Hospital F/U      8/15/2024     9:40 AM   Additional Questions   Roomed by jeniffer   Accompanied by daughter     HPI       ED/UC Followup:    Facility:  [facility]    Date of visit: 8/13/24  Reason for visit: dizziness, near syncope  Current Status: feeling better     MDM   Kate Isaac is a 88 y.o. female presenting with the above. At the time of my assessment, she reports significant improvement of her symptoms. She has a completely normal neurologic exam, including no nystagmus. She has a steady gait without any listing to either side. She has normal coordination including normal finger-nose-finger, normal heel shin. She was with people when this event occurred, there was never any unilateral deficit, no dysarthria, no facial droop noted by the patient or anyone else concerning for stroke. Patient describes a very \"faint and lightheaded\" feeling in her head that has been improving since she has been out of the heat and sitting down. No true syncope. No chest " "pain, no shortness of breath. Given very reassuring exam, overall described history, I have a low suspicion for stroke or TIA. I discussed this in detail with patient and her daughter. Initially, she had an elevated blood pressure reading here, however, this was taken on her arm with known lymphadenopathy from her cancer, when this was checked on her left arm, blood pressure is more reasonable in the 140s/150s. EKG with PVCs, but no concerning findings for acute ischemia. High-sensitivity troponins are trended per our algorithm, these are negative. Low risk per Wells for PE, no true syncope, therefore, do not think this workup indicated today. Patient was able to orally rehydrate here. I did give meclizine. On multiple reassessments, patient is doing well. She has been independently ambulatory to the bathroom throughout her time here. She continues to have no ongoing symptoms. Return precautions discussed in detail with patient and her family members, they are comfortable discharging home with close outpatient follow-up. Patient was stable at the time of discharge. We discussed the importance of close follow-up with PCP, red flag signs and symptoms, and cautions to return to care. Additional verbal discharge instructions were given. Please see discharge summary. All questions were answered and patient was discharged in stable condition.     Disposition   The patient was discharged.    Diagnosis   ENCOUNTER DIAGNOSES   ICD-10-CM   1. Dizziness R42     2. Near syncope R55     Pt here with daughter today  She has had no further episodes  Had 2 prior episodes, one 35 years ago, one 10 years ago  She feels well and would like to return to golf and driving  The meclizine is helping      Review of Systems  Constitutional, HEENT, cardiovascular, pulmonary, gi and gu systems are negative, except as otherwise noted.      Objective    BP (!) 181/81   Pulse 87   Temp 98.5  F (36.9  C) (Oral)   Resp 18   Ht 1.6 m (5' 3\")   " Wt 57.6 kg (127 lb)   SpO2 98%   BMI 22.50 kg/m    Body mass index is 22.5 kg/m .  Physical Exam   GENERAL: alert and no distress  RESP: lungs clear to auscultation - no rales, rhonchi or wheezes  CV: regular rate and rhythm, normal S1 S2, no S3 or S4, no murmur, click or rub, no peripheral edema   MS: no gross musculoskeletal defects noted, no edema    No results found for this or any previous visit (from the past 24 hour(s)).        Signed Electronically by: Lani Valencia MD

## 2024-08-16 ENCOUNTER — ORDERS ONLY (AUTO-RELEASED) (OUTPATIENT)
Dept: FAMILY MEDICINE | Facility: CLINIC | Age: 89
End: 2024-08-16
Payer: COMMERCIAL

## 2024-08-16 DIAGNOSIS — R42 LIGHTHEADEDNESS: ICD-10-CM

## 2024-08-16 DIAGNOSIS — R53.1 WEAKNESS: ICD-10-CM

## 2024-08-21 SDOH — HEALTH STABILITY: PHYSICAL HEALTH: ON AVERAGE, HOW MANY DAYS PER WEEK DO YOU ENGAGE IN MODERATE TO STRENUOUS EXERCISE (LIKE A BRISK WALK)?: 2 DAYS

## 2024-08-21 SDOH — HEALTH STABILITY: PHYSICAL HEALTH: ON AVERAGE, HOW MANY MINUTES DO YOU ENGAGE IN EXERCISE AT THIS LEVEL?: 30 MIN

## 2024-08-21 ASSESSMENT — SOCIAL DETERMINANTS OF HEALTH (SDOH): HOW OFTEN DO YOU GET TOGETHER WITH FRIENDS OR RELATIVES?: TWICE A WEEK

## 2024-08-26 ENCOUNTER — ALLIED HEALTH/NURSE VISIT (OUTPATIENT)
Dept: FAMILY MEDICINE | Facility: CLINIC | Age: 89
End: 2024-08-26
Payer: COMMERCIAL

## 2024-08-26 ENCOUNTER — OFFICE VISIT (OUTPATIENT)
Dept: FAMILY MEDICINE | Facility: CLINIC | Age: 89
End: 2024-08-26
Payer: COMMERCIAL

## 2024-08-26 VITALS
RESPIRATION RATE: 16 BRPM | SYSTOLIC BLOOD PRESSURE: 173 MMHG | HEIGHT: 63 IN | BODY MASS INDEX: 22.7 KG/M2 | OXYGEN SATURATION: 99 % | TEMPERATURE: 97.9 F | DIASTOLIC BLOOD PRESSURE: 78 MMHG | HEART RATE: 86 BPM | WEIGHT: 128.13 LBS

## 2024-08-26 VITALS — SYSTOLIC BLOOD PRESSURE: 145 MMHG | DIASTOLIC BLOOD PRESSURE: 75 MMHG | HEART RATE: 82 BPM

## 2024-08-26 DIAGNOSIS — Z00.00 ENCOUNTER FOR MEDICARE ANNUAL WELLNESS EXAM: Primary | ICD-10-CM

## 2024-08-26 DIAGNOSIS — Z23 NEED FOR TDAP VACCINATION: ICD-10-CM

## 2024-08-26 DIAGNOSIS — C50.919 MALIGNANT NEOPLASM OF FEMALE BREAST, UNSPECIFIED ESTROGEN RECEPTOR STATUS, UNSPECIFIED LATERALITY, UNSPECIFIED SITE OF BREAST (H): ICD-10-CM

## 2024-08-26 DIAGNOSIS — I10 ESSENTIAL HYPERTENSION WITH GOAL BLOOD PRESSURE LESS THAN 140/90: ICD-10-CM

## 2024-08-26 DIAGNOSIS — I10 ESSENTIAL HYPERTENSION WITH GOAL BLOOD PRESSURE LESS THAN 140/90: Primary | ICD-10-CM

## 2024-08-26 DIAGNOSIS — M85.80 OSTEOPENIA, UNSPECIFIED LOCATION: ICD-10-CM

## 2024-08-26 DIAGNOSIS — Z96.653 STATUS POST BILATERAL KNEE REPLACEMENTS: ICD-10-CM

## 2024-08-26 PROCEDURE — G0439 PPPS, SUBSEQ VISIT: HCPCS | Performed by: FAMILY MEDICINE

## 2024-08-26 PROCEDURE — 99213 OFFICE O/P EST LOW 20 MIN: CPT | Mod: 25 | Performed by: FAMILY MEDICINE

## 2024-08-26 PROCEDURE — 99207 PR NO CHARGE NURSE ONLY: CPT

## 2024-08-26 RX ORDER — AMLODIPINE BESYLATE 2.5 MG/1
2.5 TABLET ORAL DAILY
Qty: 90 TABLET | Refills: 3 | Status: SHIPPED | OUTPATIENT
Start: 2024-08-26

## 2024-08-26 RX ORDER — AMOXICILLIN 500 MG/1
2000 CAPSULE ORAL ONCE
Qty: 4 CAPSULE | Refills: 4 | Status: SHIPPED | OUTPATIENT
Start: 2024-08-26 | End: 2024-08-26

## 2024-08-26 RX ORDER — LOSARTAN POTASSIUM 100 MG/1
100 TABLET ORAL DAILY
Qty: 90 TABLET | Refills: 3 | Status: SHIPPED | OUTPATIENT
Start: 2024-08-26

## 2024-08-26 ASSESSMENT — PAIN SCALES - GENERAL: PAINLEVEL: NO PAIN (0)

## 2024-08-26 NOTE — PROGRESS NOTES
Preventive Care Visit  [facility]  Lani Valencia MD, Family Medicine  Aug 26, 2024      Assessment & Plan     Encounter for Medicare annual wellness exam      Essential hypertension with goal blood pressure less than 140/90  Not at goal here but is at home  Will schedule an ancillary visit to verify the accuracy of her cuff  - amLODIPine (NORVASC) 2.5 MG tablet; Take 1 tablet (2.5 mg) by mouth daily.  - losartan (COZAAR) 100 MG tablet; Take 1 tablet (100 mg) by mouth daily.    Status post bilateral knee replacements  Needs prophylaxis  - amoxicillin (AMOXIL) 500 MG capsule; Take 4 capsules (2,000 mg) by mouth once for 1 dose. 1 hour before dental procedure    Osteopenia  Lst dexa in normal range    Malignant neoplasm of female breast, unspecified estrogen receptor status, unspecified laterality, unspecified site of breast (H)  Gets yearly mammograms    Need for Tdap vaccination  Will get at pharmacy    Patient has been advised of split billing requirements and indicates understanding: Yes        Counseling  Appropriate preventive services were addressed with this patient via screening, questionnaire, or discussion as appropriate for fall prevention, nutrition, physical activity, Tobacco-use cessation, social engagement, weight loss and cognition.  Checklist reviewing preventive services available has been given to the patient.  Reviewed patient's diet, addressing concerns and/or questions.   She is at risk for lack of exercise and has been provided with information to increase physical activity for the benefit of her well-being.   The patient was provided with written information regarding signs of hearing loss.           Allegra Love is a 88 year old, presenting for the following:  Physical        8/26/2024     9:35 AM   Additional Questions   Roomed by Tanesha   Accompanied by daughter- Emmie         Health Care Directive  Patient has a Health Care Directive on file  Advance care  planning document is on file and is current.    HPI  Pt here with daughter  No further episodes of lightheadedness  She will turn in zio patch later today            8/21/2024   General Health   How would you rate your overall physical health? Good   Feel stress (tense, anxious, or unable to sleep) To some extent      (!) STRESS CONCERN      8/21/2024   Nutrition   Diet: Regular (no restrictions)            8/21/2024   Exercise   Days per week of moderate/strenous exercise 2 days   Average minutes spent exercising at this level 30 min      (!) EXERCISE CONCERN      8/21/2024   Social Factors   Frequency of gathering with friends or relatives Twice a week   Worry food won't last until get money to buy more No   Food not last or not have enough money for food? No   Do you have housing? (Housing is defined as stable permanent housing and does not include staying ouside in a car, in a tent, in an abandoned building, in an overnight shelter, or couch-surfing.) Yes   Are you worried about losing your housing? No   Lack of transportation? No   Unable to get utilities (heat,electricity)? No            8/21/2024   Fall Risk   Fallen 2 or more times in the past year? No    No   Trouble with walking or balance? No    No       Multiple values from one day are sorted in reverse-chronological order          8/21/2024   Activities of Daily Living- Home Safety   Needs help with the following daily activites None of the above   Safety concerns in the home None of the above            8/21/2024   Dental   Dentist two times every year? Yes            8/21/2024   Hearing Screening   Hearing concerns? (!) IT'S HARD TO FOLLOW A CONVERSATION IN A NOISY RESTAURANT OR CROWDED ROOM.            8/21/2024   Driving Risk Screening   Patient/family members have concerns about driving No            8/21/2024   General Alertness/Fatigue Screening   Have you been more tired than usual lately? No            8/21/2024   Urinary Incontinence Screening    Bothered by leaking urine in past 6 months No            2024   TB Screening   Were you born outside of the [place]? No              Today's PHQ-2 Score:       2024    11:10 AM   PHQ-2 (  [employer])   Q1: Little interest or pleasure in doing things 0   Q2: Feeling down, depressed or hopeless 0   PHQ-2 Score 0         2024   Substance Use   Alcohol more than 3/day or more than 7/wk No   Do you have a current opioid prescription? No   How severe/bad is pain from 1 to 10? 0/10 (No Pain)   Do you use any other substances recreationally? No        Social History     Tobacco Use    Smoking status: Former     Current packs/day: 0.00     Average packs/day: 1 pack/day for 32.0 years (32.0 ttl pk-yrs)     Types: Cigarettes     Start date: 1956     Quit date: 1985     Years since quittin.6    Smokeless tobacco: Never    Tobacco comments:     I stopped smoking several times from  to    Vaping Use    Vaping status: Never Used   Substance Use Topics    Alcohol use: Yes     Alcohol/week: 2.5 - 3.3 standard drinks of alcohol     Comment: I enjoy a glass of red wine in the evening.    Drug use: Never          Mammogram Screening - After age 74- determine frequency with patient based on health status, life expectancy and patient goals      Reviewed and updated as needed this visit by Provider                      Current providers sharing in care for this patient include:  Patient Care Team:  Lani Valencia MD as PCP - General  Lani Valencia MD as Assigned PCP    The following health maintenance items are reviewed in Epic and correct as of today:  Health Maintenance   Topic Date Due    ANNUAL REVIEW OF HM ORDERS  Never done    DTAP/TDAP/TD IMMUNIZATION (1 - Tdap) 2014    DEXA  2021    COVID-19 Vaccine (2023- season) 2024    INFLUENZA VACCINE (1) 2024    MAMMO SCREENING  2025    MEDICARE ANNUAL WELLNESS VISIT  2025    FALL RISK ASSESSMENT   "08/26/2025    ADVANCE CARE PLANNING  08/26/2029    PHQ-2 (once per calendar year)  Completed    Pneumococcal Vaccine: 65+ Years  Completed    ZOSTER IMMUNIZATION  Completed    RSV VACCINE (Pregnancy & 60+)  Completed    HPV IMMUNIZATION  Aged Out    MENINGITIS IMMUNIZATION  Aged Out    RSV MONOCLONAL ANTIBODY  Aged Out    COLORECTAL CANCER SCREENING  Discontinued         Review of Systems  Constitutional, HEENT, cardiovascular, pulmonary, gi and gu systems are negative, except as otherwise noted.     Objective    Exam  BP (!) 173/78   Pulse 86   Temp 97.9  F (36.6  C) (Oral)   Resp 16   Ht 1.588 m (5' 2.5\")   Wt 58.1 kg (128 lb 2 oz)   SpO2 99%   BMI 23.06 kg/m     Estimated body mass index is 23.06 kg/m  as calculated from the following:    Height as of this encounter: 1.588 m (5' 2.5\").    Weight as of this encounter: 58.1 kg (128 lb 2 oz).    Physical Exam  GENERAL: alert and no distress  EYES: Eyes grossly normal to inspection, PERRL and conjunctivae and sclerae normal  HENT: ear canals and TM's normal, nose and mouth without ulcers or lesions  NECK: no adenopathy, no asymmetry, masses, or scars  RESP: lungs clear to auscultation - no rales, rhonchi or wheezes  CV: regular rate and rhythm, normal S1 S2, no S3 or S4, no murmur, click or rub, no peripheral edema  ABDOMEN: soft, nontender, no hepatosplenomegaly, no masses and bowel sounds normal  MS: no gross musculoskeletal defects noted, no edema  SKIN: no suspicious lesions or rashes  NEURO: Normal strength and tone, mentation intact and speech normal  PSYCH: mentation appears normal, affect normal/bright         No data to display                       Signed Electronically by: Lani Valencia MD    "

## 2024-08-26 NOTE — PROGRESS NOTES
I met with Kate Isaac at the request of Dr. Waters to recheck her blood pressure.  Blood pressure medications on the med list were reviewed with patient.    Patient has taken all medications as per usual regimen: Yes  Patient reports tolerating them without any issues or concerns: Yes    Vitals:    08/26/24 1500 08/26/24 1502 08/26/24 1504 08/26/24 1509   BP: (!) 175/83 (!) 159/67 (!) 143/59 (!) 145/75   Pulse: 91 86 84 82       After 5 minutes, the patient's blood pressure remained greater than or equal to 140/90.    Is the patient currently having any chest pain? No  Does the patient currently have a headache? No  Does the patient currently have any vision changes? No  Does the patient currently have any nausea? No  Does the patient currently have any abdominal pain? No    The previous encounter was reviewed.  The patient was discharged and the note will be sent to the provider for final review.    BLAISE Bryson

## 2024-08-26 NOTE — PATIENT INSTRUCTIONS
Patient Education   Preventive Care Advice   This is general advice given by our system to help you stay healthy. However, your care team may have specific advice just for you. Please talk to your care team about your preventive care needs.  Nutrition  Eat 5 or more servings of fruits and vegetables each day.  Try wheat bread, brown rice and whole grain pasta (instead of white bread, rice, and pasta).  Get enough calcium and vitamin D. Check the label on foods and aim for 100% of the RDA (recommended daily allowance).  Lifestyle  Exercise at least 150 minutes each week  (30 minutes a day, 5 days a week).  Do muscle strengthening activities 2 days a week. These help control your weight and prevent disease.  No smoking.  Wear sunscreen to prevent skin cancer.  Have a dental exam and cleaning every 6 months.  Yearly exams  See your health care team every year to talk about:  Any changes in your health.  Any medicines your care team has prescribed.  Preventive care, family planning, and ways to prevent chronic diseases.  Shots (vaccines)   HPV shots (up to age 26), if you've never had them before.  Hepatitis B shots (up to age 59), if you've never had them before.  COVID-19 shot: Get this shot when it's due.  Flu shot: Get a flu shot every year.  Tetanus shot: Get a tetanus shot every 10 years.  Pneumococcal, hepatitis A, and RSV shots: Ask your care team if you need these based on your risk.  Shingles shot (for age 50 and up)  General health tests  Diabetes screening:  Starting at age 35, Get screened for diabetes at least every 3 years.  If you are younger than age 35, ask your care team if you should be screened for diabetes.  Cholesterol test: At age 39, start having a cholesterol test every 5 years, or more often if advised.  Bone density scan (DEXA): At age 50, ask your care team if you should have this scan for osteoporosis (brittle bones).  Hepatitis C: Get tested at least once in your life.  STIs (sexually  transmitted infections)  Before age 24: Ask your care team if you should be screened for STIs.  After age 24: Get screened for STIs if you're at risk. You are at risk for STIs (including HIV) if:  You are sexually active with more than one person.  You don't use condoms every time.  You or a partner was diagnosed with a sexually transmitted infection.  If you are at risk for HIV, ask about PrEP medicine to prevent HIV.  Get tested for HIV at least once in your life, whether you are at risk for HIV or not.  Cancer screening tests  Cervical cancer screening: If you have a cervix, begin getting regular cervical cancer screening tests starting at age 21.  Breast cancer scan (mammogram): If you've ever had breasts, begin having regular mammograms starting at age 40. This is a scan to check for breast cancer.  Colon cancer screening: It is important to start screening for colon cancer at age 45.  Have a colonoscopy test every 10 years (or more often if you're at risk) Or, ask your provider about stool tests like a FIT test every year or Cologuard test every 3 years.  To learn more about your testing options, visit:   .  For help making a decision, visit:   https://bit.ly/fj85780.  Prostate cancer screening test: If you have a prostate, ask your care team if a prostate cancer screening test (PSA) at age 55 is right for you.  Lung cancer screening: If you are a current or former smoker ages 50 to 80, ask your care team if ongoing lung cancer screenings are right for you.  For informational purposes only. Not to replace the advice of your health care provider. Copyright   2023 [facility] [employer]. All rights reserved. Clinically reviewed by the [facility]. [employer] [id number] - REV 01/24.  Hearing Loss: Care Instructions  Overview     Hearing loss is a sudden or slow decrease in how well you hear. It can range from slight to profound. Permanent hearing loss can occur with aging. It also can  happen when you are exposed long-term to loud noise. Examples include listening to loud music, riding motorcycles, or being around other loud machines.  Hearing loss can affect your work and home life. It can make you feel lonely or depressed. You may feel that you have lost your independence. But hearing aids and other devices can help you hear better and feel connected to others.  Follow-up care is a key part of your treatment and safety. Be sure to make and go to all appointments, and call your doctor if you are having problems. It's also a good idea to know your test results and keep a list of the medicines you take.  How can you care for yourself at home?  Avoid loud noises whenever possible. This helps keep your hearing from getting worse.  Always wear hearing protection around loud noises.  Wear a hearing aid as directed.  A professional can help you pick a hearing aid that will work best for you.  You can also get hearing aids over the counter for mild to moderate hearing loss.  Have hearing tests as your doctor suggests. They can show whether your hearing has changed. Your hearing aid may need to be adjusted.  Use other devices as needed. These may include:  Telephone amplifiers and hearing aids that can connect to a television, stereo, radio, or microphone.  Devices that use lights or vibrations. These alert you to the doorbell, a ringing telephone, or a baby monitor.  Television closed-captioning. This shows the words at the bottom of the screen. Most new TVs can do this.  TTY (text telephone). This lets you type messages back and forth on the telephone instead of talking or listening. These devices are also called TDD. When messages are typed on the keyboard, they are sent over the phone line to a receiving TTY. The message is shown on a monitor.  Use text messaging, social media, and email if it is hard for you to communicate by telephone.  Try to learn a listening technique called speechreading. It is  "not lipreading. You pay attention to people's gestures, expressions, posture, and tone of voice. These clues can help you understand what a person is saying. Face the person you are talking to, and have them face you. Make sure the lighting is good. You need to see the other person's face clearly.  Think about counseling if you need help to adjust to your hearing loss.  When should you call for help?  Watch closely for changes in your health, and be sure to contact your doctor if:    You think your hearing is getting worse.     You have new symptoms, such as dizziness or nausea.   Where can you learn more?  Go to https://www.[employer].net/patiented  Enter R798 in the search box to learn more about \"Hearing Loss: Care Instructions.\"  Current as of: September 27, 2023               Content Version: 14.0    4410-9937 [employer].   Care instructions adapted under license by your healthcare professional. If you have questions about a medical condition or this instruction, always ask your healthcare professional. [employer], [employer] disclaims any warranty or liability for your use of this information.         "

## 2024-08-29 NOTE — PROGRESS NOTES
DISCHARGE  Reason for Discharge: Patient has met all goals.    Equipment Issued:     Discharge Plan: Patient to continue home program.    Referring Provider:  Lani Valencia MD       07/30/24 0500   Appointment Info   Signing clinician's name / credentials Zaki Herrmann, PT, CLT   Total/Authorized Visits 2/10   Visits Used 2   Medical Diagnosis Lymphedema of right arm (I89.0)   PT Tx Diagnosis R UE Lymphedema   Other pertinent information Pt has garment coverage through insurance. Currently wearing Medi Syosset 20-30 size II since 8/26/23   Quick Adds Certification   Progress Note/Certification   Start of Care Date 07/23/24   Onset of illness/injury or Date of Surgery 06/26/24   Therapy Frequency 1x/week   Predicted Duration 4 weeks   Certification date from 07/23/24   Certification date to 08/20/24   Progress Note Due Date 08/20/24   Progress Note Completed Date 07/23/24   GOALS   PT Goals 2   PT Goal 1   Goal Identifier Garments   Goal Description Patient will progress to compression garment for long term management of lymphedema and will demonstrate independence with donning/doffing garments.   Goal Progress Pt with previous garment, Medi Syosset with silicone arm band, size II regular (eval)   Target Date 08/20/24   PT Goal 2   Goal Identifier MLD   Goal Description Pt to be independent in self MLD performance utilizing proper techniques, sequencing in order to aid in mgmt of her RUE lymphedema.   Goal Progress Pt previously performing independently with assist from  who has passed away. Pt demos some incorrect techniques at eval.   Target Date 08/20/24   Subjective Report   Subjective Report Pt with good understanding, minimal questions.   Treatment Interventions (PT)   Interventions Manual Therapy   Manual Therapy   Manual Therapy: Mobilization, MFR, MLD, friction massage minutes (22938) 15   Manual Therapy Manual Therapy 2;Manual Therapy 3;Manual Therapy 4;Manual Therapy 5   Manual Therapy 1  Compression bandaging   Manual Therapy 2 Compression garments   Manual Therapy 3 MLD   Manual Therapy 3 - Details Reviewed techniques and sequencing for MLD. Pt with overall good technique, has some questions about details in sequence which are easily answered. Pt feels more confident and comfortable in her MLD.   Manual Therapy 4 Flexitouch/compression pump   Patient Response/Progress Pt receptive to all education provided today, wanting to place POC on hold with previously scheduled appt still on the books.   Education   Learner/Method Patient   Plan   Plan for next session Place POC on hold, pt confident in her performance, no new needs. If pt cx next scheduled appt, this visit will serve as D/C.   Total Session Time   Timed Code Treatment Minutes 15   Total Treatment Time (sum of timed and untimed services) 15

## 2024-09-01 PROCEDURE — 93244 EXT ECG>48HR<7D REV&INTERPJ: CPT | Performed by: INTERNAL MEDICINE

## 2024-09-03 ENCOUNTER — MYC MEDICAL ADVICE (OUTPATIENT)
Dept: FAMILY MEDICINE | Facility: CLINIC | Age: 89
End: 2024-09-03
Payer: COMMERCIAL

## 2024-09-03 ENCOUNTER — TELEPHONE (OUTPATIENT)
Dept: FAMILY MEDICINE | Facility: CLINIC | Age: 89
End: 2024-09-03
Payer: COMMERCIAL

## 2024-09-03 NOTE — TELEPHONE ENCOUNTER
Lani Valencia MD  [facility] - Primary Care    Please call patient:    Zio patch with intermittent v tach.  Would recommend follow-up with cards  This referral has been placed  Low threshold to going into ER with chest pain or persistent tachycardia or persistent palpitations.    Lani Valencia MD

## 2024-09-03 NOTE — TELEPHONE ENCOUNTER
Patient called back.   This RN read the message from PCP to her. She understands this and will call to schedule cardiology appointment right now.     Massiel JUAREZN, RN

## 2024-09-04 NOTE — PROGRESS NOTES
HEART CARE OUT-PATIENT CONSULTATON NOTE      [facility]  [phone]      Assessment/Recommendations   Assessment: 88 year old female with dizziness, VT    Plan:  Dizziness  Unclear etiology, no symptoms during Zio despite noted arrhythmia, episode sounds more like vertigo       -As below for VT    VT  Noted on Zio 2017, again on recent Zio after ER visit for dizziness, no associated symptoms.  Had normal rest echocardiogram in 2017, no ischemic evaluation       -Add Lopressor 25mg BID       -Stress nuc       -Rest echocardiogram       -If testing negative for CAD or structural disease can repeat Zio in 1 month and if VT persistent consider referral to EP     LE edema   Likely venous insufficiency      -Echocardiogram as above      -Compression, elevation, exercise, weight loss, avoid salt    HTN  Has been quite high       -Add Metoprolol 25mg BID, increase as needed      -Continue Norvasc 2.5mg, Losartan 100mg     Lipids  LDL = 97, not on meds      -As above, if CAD found can add statin         History of Present Illness/Subjective    Indication for Consult:  I was asked to see Kate Isaac by Lani Thorne  for VT      HPI: Kate Isaac is a 88 year old female with a history of HTN, NSVT on Zio in 2017 (saw cardiology and had negative rest echocardiogram, no further testing) who presents for evaluation of NSVT.  Seen in the ER 8/13/2024 for dizziness, BP quite high, negative evaluation, advised Zio and that returned showing VT without associated symptoms.  She was advised to see cardiology.     She reports was out golfing and felt very dizzy after 9 holes, dizziness resolved by the time she left ER.  She states the dizziness felt like the world was spinning, no palpitations .  No dizziness since, no symptoms during Zio, no chest pain, no dyspnea, orthopnea, PND.  Gets tired when she tries to do more, was inactive taking care of  who recently passed.      I reviewed  notes from ER, PCP, [facility] cardiology prior to this visit.         Physical Examination  Past Cardiac History   Vitals: BP (!) 159/90 (BP Location: Right arm, Patient Position: Sitting, Cuff Size: Adult Regular)   Pulse 99   Resp 14   Wt 58.3 kg (128 lb 9.6 oz)   BMI 23.15 kg/m    BMI= Body mass index is 23.15 kg/m .  Wt Readings from Last 3 Encounters:   09/09/24 58.3 kg (128 lb 9.6 oz)   08/26/24 58.1 kg (128 lb 2 oz)   08/15/24 57.6 kg (127 lb)       General Appearance:   no distress, normal body habitus   ENT/Mouth: membranes moist, no oral lesions or bleeding gums.      EYES:  no scleral icterus, normal conjunctivae   Neck: no carotid bruits or thyromegaly   Chest/Lungs:   lungs are clear to auscultation, no rales or wheezing,  sternal scar, equal chest wall expansion    Cardiovascular:   Regular. Normal first and second heart sounds with no murmurs, rubs, or gallops; the carotid, radial and posterior tibial pulses are intact, Jugular venous pressure normal, Trace edema bilaterally    Abdomen:  no organomegaly, masses, bruits, or tenderness; bowel sounds are present   Extremities: no cyanosis or clubbing   Skin: no xanthelasma, warm.    Neurologic: normal  bilateral, no tremors           NSVT    Zio: 9/1/2024  Occasional PVCs (~1%) and occasional PACs (~4%).      74 wide QRS runs, longest ~12 seconds. Most runs likely represent brief VT, while some may be brief SVT conducted with aberrancy.     Low burden paroxysmal atrial fibrillation and atrial tachycardia (SVT):    -burden <1%    -longest lasting 1.3 minutes    -associated RVR     No symptoms reported.     Most Recent Echocardiogram: 9/22/2017  Global and regional left ventricular function is normal with an EF of 60-65%.  Right ventricular function, chamber size, wall motion, and thickness are  normal.  The inferior vena cava is normal.  No pericardial effusion is present.    Most Recent Stress Test: None    Most Recent Angiogram: None    ECG (reviewed  by myself): 2024 NSR 79 bpm, PACs           Medical History  Family History Social History   Past Medical History:   Diagnosis Date    Arthritis 2014    Per pt had it since FEB    Basal cell cancer     sees dermatologist    Breast cancer (H)     invasive ductal    HTN     Osteopenia     Recurrent UTI      Family History   Problem Relation Age of Onset    Heart Disease Mother         cad at age 70s    Cancer Mother         KIDNEY    Diabetes Mother     Coronary Artery Disease Mother     Hypertension Mother     Heart Disease Father         chf    Neurologic Disorder Father         PARKINSONS    Alzheimer Disease Father     Heart Disease Maternal Grandmother         chf    Coronary Artery Disease Maternal Grandmother     Cancer Maternal Grandfather         ?    Heart Disease Paternal Grandmother     Coronary Artery Disease Paternal Grandmother     Heart Disease Paternal Grandfather     Neurologic Disorder Paternal Grandfather         PARKINSONS    Blood Disease Brother         LEUKEMIA    Breast Cancer Other     Diabetes Other         Mother's sister's daughter    Cancer Daughter         CERVICAL    Cancer Other         THYROID    Cerebrovascular Disease Other     Breast Cancer Daughter         Social History     Socioeconomic History    Marital status:      Spouse name: Not on file    Number of children: Not on file    Years of education: Not on file    Highest education level: Not on file   Occupational History    Not on file   Tobacco Use    Smoking status: Former     Current packs/day: 0.00     Average packs/day: 1 pack/day for 32.0 years (32.0 ttl pk-yrs)     Types: Cigarettes     Start date: 1956     Quit date: 1985     Years since quittin.7    Smokeless tobacco: Never    Tobacco comments:     I stopped smoking several times from  to    Vaping Use    Vaping status: Never Used   Substance and Sexual Activity    Alcohol use: Yes     Alcohol/week: 2.5 - 3.3 standard drinks  of alcohol     Comment: I enjoy a glass of red wine in the evening.    Drug use: Never    Sexual activity: Not Currently     Partners: Male     Birth control/protection: Post-menopausal, Female Surgical     Comment: Tubal in 1965 & now I am post-menopausal   Other Topics Concern     Service No    Blood Transfusions No    Caffeine Concern No    Occupational Exposure No    Hobby Hazards No    Sleep Concern No    Stress Concern No    Weight Concern No    Special Diet No    Back Care No    Exercise No    Bike Helmet No    Seat Belt No    Self-Exams No    Parent/sibling w/ CABG, MI or angioplasty before 65F 55M? No   Social History Narrative    Not on file     Social Determinants of Health     Financial Resource Strain: Low Risk  (8/21/2024)    Financial Resource Strain     Within the past 12 months, have you or your family members you live with been unable to get utilities (heat, electricity) when it was really needed?: No   Food Insecurity: Low Risk  (8/21/2024)    Food Insecurity     Within the past 12 months, did you worry that your food would run out before you got money to buy more?: No     Within the past 12 months, did the food you bought just not last and you didn t have money to get more?: No   Transportation Needs: Low Risk  (8/21/2024)    Transportation Needs     Within the past 12 months, has lack of transportation kept you from medical appointments, getting your medicines, non-medical meetings or appointments, work, or from getting things that you need?: No   Physical Activity: Insufficiently Active (8/21/2024)    Exercise Vital Sign     Days of Exercise per Week: 2 days     Minutes of Exercise per Session: 30 min   Stress: Stress Concern Present (8/21/2024)    [place] [employer] - Occupational Stress Questionnaire     Feeling of Stress : To some extent   Social Connections: Unknown (8/21/2024)    Social Connection and Isolation Panel [NHANES]     Frequency of Communication  with Friends and Family: Not on file     Frequency of Social Gatherings with Friends and Family: Twice a week     Attends Alevism Services: Not on file     Active Member of Clubs or Organizations: Not on file     Attends Club or Organization Meetings: Not on file     Marital Status: Not on file   Interpersonal Safety: Low Risk  (3/25/2024)    Interpersonal Safety     Do you feel physically and emotionally safe where you currently live?: Yes     Within the past 12 months, have you been hit, slapped, kicked or otherwise physically hurt by someone?: No     Within the past 12 months, have you been humiliated or emotionally abused in other ways by your partner or ex-partner?: No   Housing Stability: Low Risk  (8/21/2024)    Housing Stability     Do you have housing? : Yes     Are you worried about losing your housing?: No           Medications  Allergies   Current Outpatient Medications   Medication Sig Dispense Refill    amLODIPine (NORVASC) 2.5 MG tablet Take 1 tablet (2.5 mg) by mouth daily. 90 tablet 3    calcium carb 1250 mg, 500 mg [place],/vitamin D 200 units (OSCAL WITH D) 500-200 MG-UNIT per tablet Take 1 tablet by mouth 2 times daily (with meals)      FIBER PO Take 2 capsules by mouth At Bedtime       Flaxseed, Linseed, (FLAX PO)       GLUCOSAMINE CHONDROITIN OR TABS 1 twice daily      losartan (COZAAR) 100 MG tablet Take 1 tablet (100 mg) by mouth daily. 90 tablet 3    Multiple Vitamins-Minerals (PRESERVISION AREDS 2) CAPS One in AM and one in PM      TART BOSE PO          Allergies   Allergen Reactions    Ceftriaxone Swelling    Sulfa Antibiotics Rash    Lisinopril Fatigue          Lab Results    Chemistry/lipid CBC Cardiac Enzymes/BNP/TSH/INR   Recent Labs   Lab Test 08/15/24  1011   CHOL 187   HDL 78   LDL 97   TRIG 60     Recent Labs   Lab Test 08/15/24  1011 08/21/23  1011 07/06/22  1402   LDL 97 121* 108*     Recent Labs   Lab Test 08/21/23  1011      POTASSIUM 3.9   CHLORIDE 104   CO2 25   GLC 96  "  BUN 10.5   CR 0.46*   GFRESTIMATED >90   SASHA 9.1     Recent Labs   Lab Test 08/21/23  1011 07/06/22  1402 06/03/21  0944   CR 0.46* 0.51* 0.54     No results for input(s): \"A1C\" in the last 59726 hours.       Recent Labs   Lab Test 08/21/23  1011   WBC 6.3   HGB 13.5   HCT 40.4   MCV 87        Recent Labs   Lab Test 08/21/23  1011 02/22/21  1107 01/26/21  1017   HGB 13.5 12.2 9.2*    No results for input(s): \"TROPONINI\" in the last 53893 hours.  No results for input(s): \"BNP\", \"NTBNPI\", \"NTBNP\" in the last 41144 hours.  Recent Labs   Lab Test 08/15/24  1011   TSH 2.22     No results for input(s): \"INR\" in the last 64313 hours.     Mariely Gruber MD  Noninvasive Cardiologist   [facility]                                    "

## 2024-09-08 ENCOUNTER — TRANSFERRED RECORDS (OUTPATIENT)
Dept: HEALTH INFORMATION MANAGEMENT | Facility: CLINIC | Age: 89
End: 2024-09-08
Payer: COMMERCIAL

## 2024-09-09 ENCOUNTER — OFFICE VISIT (OUTPATIENT)
Dept: CARDIOLOGY | Facility: CLINIC | Age: 89
End: 2024-09-09
Attending: FAMILY MEDICINE
Payer: COMMERCIAL

## 2024-09-09 VITALS
DIASTOLIC BLOOD PRESSURE: 90 MMHG | SYSTOLIC BLOOD PRESSURE: 159 MMHG | HEART RATE: 99 BPM | RESPIRATION RATE: 14 BRPM | WEIGHT: 128.6 LBS | BODY MASS INDEX: 23.15 KG/M2

## 2024-09-09 DIAGNOSIS — R60.0 BILATERAL LEG EDEMA: ICD-10-CM

## 2024-09-09 DIAGNOSIS — I10 PRIMARY HYPERTENSION: ICD-10-CM

## 2024-09-09 DIAGNOSIS — I47.29 VENTRICULAR TACHYCARDIA, PAROXYSMAL (H): Primary | ICD-10-CM

## 2024-09-09 PROCEDURE — 99204 OFFICE O/P NEW MOD 45 MIN: CPT | Performed by: INTERNAL MEDICINE

## 2024-09-09 RX ORDER — METOPROLOL TARTRATE 25 MG/1
25 TABLET, FILM COATED ORAL 2 TIMES DAILY
Qty: 60 TABLET | Refills: 11 | Status: SHIPPED | OUTPATIENT
Start: 2024-09-09

## 2024-09-09 NOTE — PATIENT INSTRUCTIONS
"You are having a fast arrhythmia called \"VT\".  This is the same thing you had back in 2017.  Heart testing was normal at that time.The dizziness on the golf course was probably not related to this arrhythmia, more likely was vertigo and maybe some dehydration     Even though the VT was not associated with symptoms, it can be caused by blocked vessels to the heart or heart failure.  Will get a stress test to bhupendra for blocked vessels and an echocardiogram to make sure no heart failure.    Will add a medication called Metoprolol 25mg twice daily.  If the heart test look normal will plan to repeat a heart monitor in 2-4 weeks to make sure the new medication is controlling the VT.  If it is not can have yo see one of my partners who specializes in arrhythmia treatment       "

## 2024-09-09 NOTE — LETTER
9/9/2024    Lani Valencia MD  [address] [facility] [address]    RE: Kate Isaac       Dear Colleague,     [facility] the [facility].    HEART CARE OUT-PATIENT CONSULTATON NOTE      [facility]  [phone]      Assessment/Recommendations   Assessment: 88 year old female with dizziness, VT    Plan:  Dizziness  Unclear etiology, no symptoms during Zio despite noted arrhythmia, episode sounds more like vertigo       -As below for VT    VT  Noted on Zio 2017, again on recent Zio after ER visit for dizziness, no associated symptoms.  Had normal rest echocardiogram in 2017, no ischemic evaluation       -Add Lopressor 25mg BID       -Stress nuc       -Rest echocardiogram       -If testing negative for CAD or structural disease can repeat Zio in 1 month and if VT persistent consider referral to EP     LE edema   Likely venous insufficiency      -Echocardiogram as above      -Compression, elevation, exercise, weight loss, avoid salt    HTN  Has been quite high       -Add Metoprolol 25mg BID, increase as needed      -Continue Norvasc 2.5mg, Losartan 100mg     Lipids  LDL = 97, not on meds      -As above, if CAD found can add statin         History of Present Illness/Subjective    Indication for Consult:  I was asked to see Kate Isaac by Lani Thorne  for VT      HPI: Kate Isaac is a 88 year old female with a history of HTN, NSVT on Zio in 2017 (saw cardiology and had negative rest echocardiogram, no further testing) who presents for evaluation of NSVT.  Seen in the ER 8/13/2024 for dizziness, BP quite high, negative evaluation, advised Zio and that returned showing VT without associated symptoms.  She was advised to see cardiology.     She reports was out golfing and felt very dizzy after 9 holes, dizziness resolved by the time she left ER.  She states the dizziness felt like the world was spinning, no  palpitations .  No dizziness since, no symptoms during Zio, no chest pain, no dyspnea, orthopnea, PND.  Gets tired when she tries to do more, was inactive taking care of  who recently passed.      I reviewed notes from ER, PCP,  cardiology prior to this visit.         Physical Examination  Past Cardiac History   Vitals: BP (!) 159/90 (BP Location: Right arm, Patient Position: Sitting, Cuff Size: Adult Regular)   Pulse 99   Resp 14   Wt 58.3 kg (128 lb 9.6 oz)   BMI 23.15 kg/m    BMI= Body mass index is 23.15 kg/m .  Wt Readings from Last 3 Encounters:   09/09/24 58.3 kg (128 lb 9.6 oz)   08/26/24 58.1 kg (128 lb 2 oz)   08/15/24 57.6 kg (127 lb)       General Appearance:   no distress, normal body habitus   ENT/Mouth: membranes moist, no oral lesions or bleeding gums.      EYES:  no scleral icterus, normal conjunctivae   Neck: no carotid bruits or thyromegaly   Chest/Lungs:   lungs are clear to auscultation, no rales or wheezing,  sternal scar, equal chest wall expansion    Cardiovascular:   Regular. Normal first and second heart sounds with no murmurs, rubs, or gallops; the carotid, radial and posterior tibial pulses are intact, Jugular venous pressure normal, Trace edema bilaterally    Abdomen:  no organomegaly, masses, bruits, or tenderness; bowel sounds are present   Extremities: no cyanosis or clubbing   Skin: no xanthelasma, warm.    Neurologic: normal  bilateral, no tremors           NSVT    Zio: 9/1/2024  Occasional PVCs (~1%) and occasional PACs (~4%).      74 wide QRS runs, longest ~12 seconds. Most runs likely represent brief VT, while some may be brief SVT conducted with aberrancy.     Low burden paroxysmal atrial fibrillation and atrial tachycardia (SVT):    -burden <1%    -longest lasting 1.3 minutes    -associated RVR     No symptoms reported.     Most Recent Echocardiogram: 9/22/2017  Global and regional left ventricular function is normal with an EF of 60-65%.  Right ventricular  function, chamber size, wall motion, and thickness are  normal.  The inferior vena cava is normal.  No pericardial effusion is present.    Most Recent Stress Test: None    Most Recent Angiogram: None    ECG (reviewed by myself): 8/13/2024 NSR 79 bpm, PACs           Medical History  Family History Social History   Past Medical History:   Diagnosis Date     Arthritis 02/01/2014    Per pt had it since FEB     Basal cell cancer     sees dermatologist     Breast cancer (H) 1996    invasive ductal     HTN      Osteopenia      Recurrent UTI      Family History   Problem Relation Age of Onset     Heart Disease Mother         cad at age 70s     Cancer Mother         KIDNEY     Diabetes Mother      Coronary Artery Disease Mother      Hypertension Mother      Heart Disease Father         chf     Neurologic Disorder Father         PARKINSONS     Alzheimer Disease Father      Heart Disease Maternal Grandmother         chf     Coronary Artery Disease Maternal Grandmother      Cancer Maternal Grandfather         ?     Heart Disease Paternal Grandmother      Coronary Artery Disease Paternal Grandmother      Heart Disease Paternal Grandfather      Neurologic Disorder Paternal Grandfather         PARKINSONS     Blood Disease Brother         LEUKEMIA     Breast Cancer Other      Diabetes Other         Mother's sister's daughter     Cancer Daughter         CERVICAL     Cancer Other         THYROID     Cerebrovascular Disease Other      Breast Cancer Daughter         Social History     Socioeconomic History     Marital status:      Spouse name: Not on file     Number of children: Not on file     Years of education: Not on file     Highest education level: Not on file   Occupational History     Not on file   Tobacco Use     Smoking status: Former     Current packs/day: 0.00     Average packs/day: 1 pack/day for 32.0 years (32.0 ttl pk-yrs)     Types: Cigarettes     Start date: 1/1/1956     Quit date: 1/1/1985     Years since  quittin.7     Smokeless tobacco: Never     Tobacco comments:     I stopped smoking several times from  to    Vaping Use     Vaping status: Never Used   Substance and Sexual Activity     Alcohol use: Yes     Alcohol/week: 2.5 - 3.3 standard drinks of alcohol     Comment: I enjoy a glass of red wine in the evening.     Drug use: Never     Sexual activity: Not Currently     Partners: Male     Birth control/protection: Post-menopausal, Female Surgical     Comment: Tubal in 1965 & now I am post-menopausal   Other Topics Concern      Service No     Blood Transfusions No     Caffeine Concern No     Occupational Exposure No     Hobby Hazards No     Sleep Concern No     Stress Concern No     Weight Concern No     Special Diet No     Back Care No     Exercise No     Bike Helmet No     Seat Belt No     Self-Exams No     Parent/sibling w/ CABG, MI or angioplasty before 65F 55M? No   Social History Narrative     Not on file     Social Determinants of Health     Financial Resource Strain: Low Risk  (2024)    Financial Resource Strain      Within the past 12 months, have you or your family members you live with been unable to get utilities (heat, electricity) when it was really needed?: No   Food Insecurity: Low Risk  (2024)    Food Insecurity      Within the past 12 months, did you worry that your food would run out before you got money to buy more?: No      Within the past 12 months, did the food you bought just not last and you didn t have money to get more?: No   Transportation Needs: Low Risk  (2024)    Transportation Needs      Within the past 12 months, has lack of transportation kept you from medical appointments, getting your medicines, non-medical meetings or appointments, work, or from getting things that you need?: No   Physical Activity: Insufficiently Active (2024)    Exercise Vital Sign      Days of Exercise per Week: 2 days      Minutes of Exercise per Session: 30 min    Stress: Stress Concern Present (8/21/2024)    [place] [employer]ational [employer] - Occupational Stress Questionnaire      Feeling of Stress : To some extent   Social Connections: Unknown (8/21/2024)    Social Connection and Isolation Panel [NHANES]      Frequency of Communication with Friends and Family: Not on file      Frequency of Social Gatherings with Friends and Family: Twice a week      Attends Rastafari Services: Not on file      Active Member of Clubs or Organizations: Not on file      Attends Club or Organization Meetings: Not on file      Marital Status: Not on file   Interpersonal Safety: Low Risk  (3/25/2024)    Interpersonal Safety      Do you feel physically and emotionally safe where you currently live?: Yes      Within the past 12 months, have you been hit, slapped, kicked or otherwise physically hurt by someone?: No      Within the past 12 months, have you been humiliated or emotionally abused in other ways by your partner or ex-partner?: No   Housing Stability: Low Risk  (8/21/2024)    Housing Stability      Do you have housing? : Yes      Are you worried about losing your housing?: No           Medications  Allergies   Current Outpatient Medications   Medication Sig Dispense Refill     amLODIPine (NORVASC) 2.5 MG tablet Take 1 tablet (2.5 mg) by mouth daily. 90 tablet 3     calcium carb 1250 mg, 500 mg [place],/vitamin D 200 units (OSCAL WITH D) 500-200 MG-UNIT per tablet Take 1 tablet by mouth 2 times daily (with meals)       FIBER PO Take 2 capsules by mouth At Bedtime        Flaxseed, Linseed, (FLAX PO)        GLUCOSAMINE CHONDROITIN OR TABS 1 twice daily       losartan (COZAAR) 100 MG tablet Take 1 tablet (100 mg) by mouth daily. 90 tablet 3     Multiple Vitamins-Minerals (PRESERVISION AREDS 2) CAPS One in AM and one in PM       TART BOSE PO          Allergies   Allergen Reactions     Ceftriaxone Swelling     Sulfa Antibiotics Rash     Lisinopril Fatigue          Lab Results   "  Chemistry/lipid CBC Cardiac Enzymes/BNP/TSH/INR   Recent Labs   Lab Test 08/15/24  1011   CHOL 187   HDL 78   LDL 97   TRIG 60     Recent Labs   Lab Test 08/15/24  1011 08/21/23  1011 07/06/22  1402   LDL 97 121* 108*     Recent Labs   Lab Test 08/21/23  1011      POTASSIUM 3.9   CHLORIDE 104   CO2 25   GLC 96   BUN 10.5   CR 0.46*   GFRESTIMATED >90   SASHA 9.1     Recent Labs   Lab Test 08/21/23  1011 07/06/22  1402 06/03/21  0944   CR 0.46* 0.51* 0.54     No results for input(s): \"A1C\" in the last 29242 hours.       Recent Labs   Lab Test 08/21/23  1011   WBC 6.3   HGB 13.5   HCT 40.4   MCV 87        Recent Labs   Lab Test 08/21/23  1011 02/22/21  1107 01/26/21  1017   HGB 13.5 12.2 9.2*    No results for input(s): \"TROPONINI\" in the last 68946 hours.  No results for input(s): \"BNP\", \"NTBNPI\", \"NTBNP\" in the last 44641 hours.  Recent Labs   Lab Test 08/15/24  1011   TSH 2.22     No results for input(s): \"INR\" in the last 63318 hours.     Mariely Gruber MD  Noninvasive Cardiologist   [facility]                                        Thank you for allowing me to participate in the care of your patient.      Sincerely,     Mariely Gruber MD     [facility]  cc:   Lani Valencia MD  [id number] [place] [address]      "

## 2024-09-16 DIAGNOSIS — I10 PRIMARY HYPERTENSION: Primary | ICD-10-CM

## 2024-09-16 RX ORDER — METOPROLOL TARTRATE 25 MG/1
25 TABLET, FILM COATED ORAL 2 TIMES DAILY
Qty: 180 TABLET | Refills: 3 | Status: SHIPPED | OUTPATIENT
Start: 2024-09-16

## 2024-09-27 ENCOUNTER — HOSPITAL ENCOUNTER (OUTPATIENT)
Dept: CARDIOLOGY | Facility: HOSPITAL | Age: 89
Discharge: HOME | End: 2024-09-27
Attending: INTERNAL MEDICINE
Payer: COMMERCIAL

## 2024-09-27 ENCOUNTER — HOSPITAL ENCOUNTER (OUTPATIENT)
Dept: NUCLEAR MEDICINE | Facility: HOSPITAL | Age: 89
Discharge: HOME | End: 2024-09-27
Attending: INTERNAL MEDICINE
Payer: COMMERCIAL

## 2024-09-27 DIAGNOSIS — I47.29 VENTRICULAR TACHYCARDIA, PAROXYSMAL (H): ICD-10-CM

## 2024-09-27 LAB
CV STRESS CURRENT BP HE: NORMAL
CV STRESS CURRENT HR HE: 101
CV STRESS CURRENT HR HE: 104
CV STRESS CURRENT HR HE: 76
CV STRESS CURRENT HR HE: 83
CV STRESS CURRENT HR HE: 90
CV STRESS CURRENT HR HE: 91
CV STRESS CURRENT HR HE: 91
CV STRESS CURRENT HR HE: 92
CV STRESS CURRENT HR HE: 92
CV STRESS CURRENT HR HE: 96
CV STRESS CURRENT HR HE: 96
CV STRESS CURRENT HR HE: 97
CV STRESS CURRENT HR HE: 98
CV STRESS CURRENT HR HE: 98
CV STRESS CURRENT HR HE: 99
CV STRESS DEVIATION TIME HE: NORMAL
CV STRESS ECHO PERCENT HR HE: NORMAL
CV STRESS EXERCISE STAGE HE: NORMAL
CV STRESS FINAL RESTING BP HE: NORMAL
CV STRESS FINAL RESTING HR HE: 90
CV STRESS MAX HR HE: 106
CV STRESS MAX TREADMILL GRADE HE: 0
CV STRESS MAX TREADMILL SPEED HE: 0
CV STRESS PEAK DIA BP HE: NORMAL
CV STRESS PEAK SYS BP HE: NORMAL
CV STRESS PHASE HE: NORMAL
CV STRESS PROTOCOL HE: NORMAL
CV STRESS RESTING PT POSITION HE: NORMAL
CV STRESS ST DEVIATION AMOUNT HE: NORMAL
CV STRESS ST DEVIATION ELEVATION HE: NORMAL
CV STRESS ST EVELATION AMOUNT HE: NORMAL
CV STRESS TEST TYPE HE: NORMAL
CV STRESS TOTAL STAGE TIME MIN 1 HE: NORMAL
LVEF ECHO: NORMAL
NUC STRESS EJECTION FRACTION: 70 %
RATE PRESSURE PRODUCT: NORMAL
STRESS ECHO BASELINE DIASTOLIC HE: 79
STRESS ECHO BASELINE HR: 72
STRESS ECHO BASELINE SYSTOLIC BP: 191
STRESS ECHO CALCULATED PERCENT HR: 79 %
STRESS ECHO LAST STRESS DIASTOLIC BP: 67
STRESS ECHO LAST STRESS HR: 98
STRESS ECHO LAST STRESS SYSTOLIC BP: 150
STRESS ECHO TARGET HR: 134

## 2024-09-27 PROCEDURE — 250N000011 HC RX IP 250 OP 636: Performed by: INTERNAL MEDICINE

## 2024-09-27 PROCEDURE — 343N000001 HC RX 343: Performed by: INTERNAL MEDICINE

## 2024-09-27 PROCEDURE — 93017 CV STRESS TEST TRACING ONLY: CPT

## 2024-09-27 PROCEDURE — 93306 TTE W/DOPPLER COMPLETE: CPT

## 2024-09-27 PROCEDURE — 93306 TTE W/DOPPLER COMPLETE: CPT | Mod: 26 | Performed by: INTERNAL MEDICINE

## 2024-09-27 PROCEDURE — 93016 CV STRESS TEST SUPVJ ONLY: CPT | Performed by: INTERNAL MEDICINE

## 2024-09-27 PROCEDURE — 78452 HT MUSCLE IMAGE SPECT MULT: CPT

## 2024-09-27 PROCEDURE — 93018 CV STRESS TEST I&R ONLY: CPT | Performed by: INTERNAL MEDICINE

## 2024-09-27 PROCEDURE — 78452 HT MUSCLE IMAGE SPECT MULT: CPT | Mod: 26 | Performed by: INTERNAL MEDICINE

## 2024-09-27 PROCEDURE — A9500 TC99M SESTAMIBI: HCPCS | Performed by: INTERNAL MEDICINE

## 2024-09-27 RX ORDER — AMINOPHYLLINE 25 MG/ML
50-100 INJECTION, SOLUTION INTRAVENOUS
Status: DISCONTINUED | OUTPATIENT
Start: 2024-09-27 | End: 2024-09-28 | Stop reason: HOSPADM

## 2024-09-27 RX ORDER — ALBUTEROL SULFATE 0.83 MG/ML
2.5 SOLUTION RESPIRATORY (INHALATION)
Status: DISCONTINUED | OUTPATIENT
Start: 2024-09-27 | End: 2024-09-27 | Stop reason: HOSPADM

## 2024-09-27 RX ORDER — CAFFEINE 200 MG
200 TABLET ORAL
Status: DISCONTINUED | OUTPATIENT
Start: 2024-09-27 | End: 2024-09-27 | Stop reason: HOSPADM

## 2024-09-27 RX ORDER — CAFFEINE CITRATE 20 MG/ML
60 SOLUTION INTRAVENOUS
Status: DISCONTINUED | OUTPATIENT
Start: 2024-09-27 | End: 2024-09-27 | Stop reason: HOSPADM

## 2024-09-27 RX ORDER — REGADENOSON 0.08 MG/ML
0.4 INJECTION, SOLUTION INTRAVENOUS ONCE
Status: COMPLETED | OUTPATIENT
Start: 2024-09-27 | End: 2024-09-27

## 2024-09-27 RX ADMIN — Medication 8.4 MILLICURIE: at 08:53

## 2024-09-27 RX ADMIN — Medication 31.2 MILLICURIE: at 11:24

## 2024-09-27 RX ADMIN — REGADENOSON 0.4 MG: 0.08 INJECTION, SOLUTION INTRAVENOUS at 10:35

## 2024-09-30 DIAGNOSIS — I47.29 VENTRICULAR TACHYCARDIA, PAROXYSMAL (H): Primary | ICD-10-CM

## 2024-10-25 ENCOUNTER — TELEPHONE (OUTPATIENT)
Dept: CARDIOLOGY | Facility: CLINIC | Age: 89
End: 2024-10-25
Payer: COMMERCIAL

## 2024-10-25 NOTE — TELEPHONE ENCOUNTER
Phone call to pt who provided symptom update.     Pt stated she was sitting at her kitchen table and went to stand up and became suddenly dizzy so sat back down. Stated this resolved quickly. Denied any LOC, SOB, chest pain, hot/cold flashes, nausea. Pt stated this episode felt similar to the episode that brought her into the ED 8/12/24.     Saw Dr. Gruber 9/9/24:  Plan:  Dizziness: Unclear etiology, no symptoms during Zio despite noted arrhythmia, episode sounds more like vertigo   VT: Noted on Zio 2017, again on recent Zio after ER visit for dizziness, no associated symptoms.  -If testing negative for CAD or structural disease can repeat Zio in 1 month and if VT persistent consider referral to EP  HTN: Has been quite high       -Add Metoprolol 25mg BID, increase as needed      -Continue Norvasc 2.5mg, Losartan 100mg     Noted 3-day Zio expected to mail out 10/30/24. Pt states she has been taking her medications as prescribed. Checks her BP daily. Stated they are generally 120/60s.     Date BP HR   10/25/24 /66 No data   10/25/24 /65 69     Encouraged pt to also track HR when checking BPs. Instructed pt that if becomes dizzy again and is safe to do so, to try to check BP (i.e. if daughter is around). Encouraged pt to also change positions slowly and ensure has adequate water intake. If becomes dizzy again or experiences new or worsening symptoms, instructed pt to present to the ED for evaluation. Pt verbalized understanding.     Reassured pt that update would be forwarded to Dr. Gruber but may not receive a response before end of day. Reviewed use of PEER messaging and when to use versus when to call. Understanding verbalized. Pt and daughter very appreciative of discussion. LMS

## 2024-10-25 NOTE — TELEPHONE ENCOUNTER
[facility] Call Center    Phone Message    May a detailed message be left on voicemail: yes     Reason for Call: Symptoms or Concerns     If patient has red-flag symptoms, warm transfer to triage line    Current symptom or concern: Dizziness episode/Sent Mychart message    Symptoms have been present for:  1 day(s)    Has patient previously been seen for this? Yes    By Mariely Gruber:     Date: [id number]    Are there any new or worsening symptoms? Yes: Pt had another episode.Please call to discuss.     Action Taken: TE SENT    Travel Screening: Not Applicable     Date of Service:             Thank you!  [facility]

## 2024-10-28 NOTE — TELEPHONE ENCOUNTER
----- Message -----  From: Mariely Gruber MD  Sent: 10/25/2024   7:32 PM CDT  To: Laura Linda RN    Not sure the dizziness is related to the VT (or her heart at all).  Await Zio result and if still with VT can have her see EP    Mariely  ----- Message -----  From: Laura Linda RN  Sent: 10/25/2024   3:01 PM CDT  To: Mariely Gruber MD    Please see pt dizziness episode and symptom update. She also sent you a [employer] message which I addressed during this call. 3-day Zio to mail out 10/30. Any further recommendations? Thanks. LMS    ==  Received phone call from pt and daughter Emmie to provide symptom update from the weekend. Pt has been monitoring BP/HR over the weekend. Reviewed below. Reassured pt of normal readings:    Date BP AM HR BP PM HR   10/26 122/61 65 121/62 77   10/27 128/69 71 119/61 74   10/28 125/57 69       Notes no further dizziness episodes. Informed pt of Dr. Gruber's response/recommendations. Encouraged pt to follow-up with PCP for potential other causes for dizziness. Encouraged activity as tolerated. Instructed pt that if has new or worsening symptoms to present to the ED. Pt verbalized understanding. Informed pt that once Zio received/worn/returned, she would receive a return call once results have been reviewed by Dr. Gruber. Pt very grateful of plan of care and verbalized understanding. COBY

## 2024-10-30 ENCOUNTER — ORDERS ONLY (AUTO-RELEASED) (OUTPATIENT)
Dept: NUCLEAR MEDICINE | Facility: HOSPITAL | Age: 89
End: 2024-10-30
Payer: COMMERCIAL

## 2024-10-30 DIAGNOSIS — I47.29 VENTRICULAR TACHYCARDIA, PAROXYSMAL (H): ICD-10-CM

## 2024-11-06 ENCOUNTER — OFFICE VISIT (OUTPATIENT)
Dept: FAMILY MEDICINE | Facility: CLINIC | Age: 89
End: 2024-11-06
Payer: COMMERCIAL

## 2024-11-06 VITALS
BODY MASS INDEX: 23.11 KG/M2 | RESPIRATION RATE: 18 BRPM | TEMPERATURE: 97.5 F | HEIGHT: 63 IN | DIASTOLIC BLOOD PRESSURE: 70 MMHG | HEART RATE: 87 BPM | OXYGEN SATURATION: 99 % | WEIGHT: 130.4 LBS | SYSTOLIC BLOOD PRESSURE: 144 MMHG

## 2024-11-06 DIAGNOSIS — R42 ORTHOSTATIC LIGHTHEADEDNESS: Primary | ICD-10-CM

## 2024-11-06 LAB
ERYTHROCYTE [DISTWIDTH] IN BLOOD BY AUTOMATED COUNT: 12.9 % (ref 10–15)
HCT VFR BLD AUTO: 41.4 % (ref 35–47)
HGB BLD-MCNC: 13.3 G/DL (ref 11.7–15.7)
MCH RBC QN AUTO: 28.7 PG (ref 26.5–33)
MCHC RBC AUTO-ENTMCNC: 32.1 G/DL (ref 31.5–36.5)
MCV RBC AUTO: 89 FL (ref 78–100)
PLATELET # BLD AUTO: 310 10E3/UL (ref 150–450)
RBC # BLD AUTO: 4.64 10E6/UL (ref 3.8–5.2)
WBC # BLD AUTO: 6.8 10E3/UL (ref 4–11)

## 2024-11-06 PROCEDURE — 85027 COMPLETE CBC AUTOMATED: CPT | Performed by: NURSE PRACTITIONER

## 2024-11-06 PROCEDURE — 99213 OFFICE O/P EST LOW 20 MIN: CPT | Performed by: NURSE PRACTITIONER

## 2024-11-06 PROCEDURE — 80048 BASIC METABOLIC PNL TOTAL CA: CPT | Performed by: NURSE PRACTITIONER

## 2024-11-06 PROCEDURE — 36415 COLL VENOUS BLD VENIPUNCTURE: CPT | Performed by: NURSE PRACTITIONER

## 2024-11-06 ASSESSMENT — PAIN SCALES - GENERAL: PAINLEVEL_OUTOF10: NO PAIN (0)

## 2024-11-06 NOTE — PROGRESS NOTES
Assessment & Plan     Orthostatic lightheadedness  Wear compression stockings every day.  When sitting for extended periods, keep legs elevated. Get up slowly, move legs around to get blood flowing again.  Increase salt in the diet.  Aim for 6-8 glasses of fluid a day from all sources- including soups, coffee, water, juice, etc.   We will check your electrolytes today and screen for anemia as a cause for your lightheadedness.   - Basic metabolic panel  - CBC with platelets            Subjective   Kate is a 89 year old, presenting for the following health issues:  Dizziness        11/6/2024     1:35 PM   Additional Questions   Roomed by Fatmata WELSH   Accompanied by self     First time in August- saw / /mary  Second episode in october Started to get up out of the table and fetl so lightheaded. Had just finished eating and was sitting looking at the table.      passed away in and her emotions have been up and down.     Sleeping okay. Was waking up over and over again. Started setting her phone with an larm, going to bed at 10 pm and getting up at 6am which has helped.     Appetite: okay, struggles with menu planning. Doesn't like milk, so eats more cheese and yogurt. Adding more protein, fish, chicken, soups. 3 meals, then crackers, cheese, nuts, for snacks. Glass of red wine. Cup of hot tea before bed with cookie.     Home pulse readings in 70s    Home bp readings 114//63    History of Present Illness       Reason for visit:  Dizziness or being light headed.  Cardiologist thought something other than my heart may be the problem.    She eats 0-1 servings of fruits and vegetables daily.She consumes 3 sweetened beverage(s) daily.She exercises with enough effort to increase her heart rate 9 or less minutes per day.  She exercises with enough effort to increase her heart rate 3 or less days per week.   She is taking medications regularly.             Objective    BP (!) 144/70   Pulse 87   Temp 97.5  F  "(36.4  C) (Tympanic)   Resp 18   Ht 1.588 m (5' 2.5\")   Wt 59.1 kg (130 lb 6.4 oz)   SpO2 99%   BMI 23.47 kg/m    Body mass index is 23.47 kg/m .  Physical Exam  Constitutional:       Appearance: Normal appearance. She is not ill-appearing.   HENT:      Right Ear: External ear normal.      Left Ear: External ear normal.      Nose: Nose normal. No congestion.      Mouth/Throat:      Mouth: Mucous membranes are moist.      Pharynx: Oropharynx is clear.   Eyes:      Pupils: Pupils are equal, round, and reactive to light.   Cardiovascular:      Rate and Rhythm: Normal rate and regular rhythm.      Pulses: Normal pulses.      Heart sounds: Normal heart sounds. No murmur heard.     No friction rub. No gallop.   Pulmonary:      Effort: Pulmonary effort is normal.      Breath sounds: Normal breath sounds.   Musculoskeletal:         General: Normal range of motion.      Cervical back: Normal range of motion.   Lymphadenopathy:      Cervical: No cervical adenopathy.   Skin:     General: Skin is warm and dry.   Neurological:      General: No focal deficit present.      Mental Status: She is alert and oriented to person, place, and time.   Psychiatric:         Mood and Affect: Mood normal.         Behavior: Behavior normal.         Thought Content: Thought content normal.         Judgment: Judgment normal.                    Signed Electronically by: MIKI Monet CNP    "

## 2024-11-06 NOTE — PATIENT INSTRUCTIONS
Wear compression stockings every day.  When sitting for extended periods, keep legs elevated. Get up slowly, move legs around to get blood flowing again.  Increase salt in the diet.  Aim for 6-8 glasses of fluid a day from all sources- including soups, coffee, water, juice, etc.   We will check your electrolytes today and screen for anemia as a cause for your lightheadedness.

## 2024-11-07 ENCOUNTER — TELEPHONE (OUTPATIENT)
Dept: FAMILY MEDICINE | Facility: CLINIC | Age: 89
End: 2024-11-07
Payer: COMMERCIAL

## 2024-11-07 DIAGNOSIS — I10 ESSENTIAL HYPERTENSION WITH GOAL BLOOD PRESSURE LESS THAN 140/90: ICD-10-CM

## 2024-11-07 LAB
ANION GAP SERPL CALCULATED.3IONS-SCNC: 12 MMOL/L (ref 7–15)
BUN SERPL-MCNC: 13.8 MG/DL (ref 8–23)
CALCIUM SERPL-MCNC: 9.2 MG/DL (ref 8.8–10.4)
CHLORIDE SERPL-SCNC: 100 MMOL/L (ref 98–107)
CREAT SERPL-MCNC: 0.48 MG/DL (ref 0.51–0.95)
EGFRCR SERPLBLD CKD-EPI 2021: >90 ML/MIN/1.73M2
GLUCOSE SERPL-MCNC: 87 MG/DL (ref 70–99)
HCO3 SERPL-SCNC: 25 MMOL/L (ref 22–29)
POTASSIUM SERPL-SCNC: 3.9 MMOL/L (ref 3.4–5.3)
SODIUM SERPL-SCNC: 137 MMOL/L (ref 135–145)

## 2024-11-07 RX ORDER — AMLODIPINE BESYLATE 2.5 MG/1
2.5 TABLET ORAL DAILY
Status: SHIPPED
Start: 2024-11-07

## 2024-11-07 NOTE — TELEPHONE ENCOUNTER
Patient calling and states amlodipine 2.5mg was removed from her medication list during her appointment yesterday. However, patient is still taking this and isn't sure why it was removed.   This Order Has Been Discontinued    Order Status Reason By On   Discontinued None Fatmata Altman MA 11/6/24 [id number]     Pt would like medication put back on her medication list, as she is still taking this. Pt does not need a refill yet.     Missy Huitron RN    [facility]- Primary Care

## 2024-11-08 ENCOUNTER — TELEPHONE (OUTPATIENT)
Dept: CARDIOLOGY | Facility: CLINIC | Age: 89
End: 2024-11-08

## 2024-11-08 DIAGNOSIS — I47.29 VENTRICULAR TACHYCARDIA, PAROXYSMAL (H): ICD-10-CM

## 2024-11-08 DIAGNOSIS — I10 PRIMARY HYPERTENSION: ICD-10-CM

## 2024-11-08 DIAGNOSIS — I10 PRIMARY HYPERTENSION: Primary | ICD-10-CM

## 2024-11-08 PROCEDURE — 93244 EXT ECG>48HR<7D REV&INTERPJ: CPT | Performed by: INTERNAL MEDICINE

## 2024-11-08 RX ORDER — METOPROLOL TARTRATE 50 MG
50 TABLET ORAL 2 TIMES DAILY
Qty: 180 TABLET | Refills: 1 | Status: SHIPPED | OUTPATIENT
Start: 2024-11-08

## 2024-11-08 RX ORDER — METOPROLOL TARTRATE 50 MG
25 TABLET ORAL 2 TIMES DAILY
Qty: 90 TABLET | Refills: 1 | Status: SHIPPED | OUTPATIENT
Start: 2024-11-08 | End: 2024-11-08

## 2024-11-08 NOTE — TELEPHONE ENCOUNTER
[facility] Call Center    Phone Message    May a detailed message be left on voicemail: yes     Reason for Call: Medication Question or concern regarding medication   Prescription Clarification  Name of Medication: metoprolol tartrate (LOPRESSOR) 50 MG tablet    Prescribing Provider: Mariely Gruber     Pharmacy: [facility] - [place], [place] - [address] AT [address]     What on the order needs clarification? Pt has questions regarding new dosing and would like to discuss with care team.       Action Taken: Message routed to:  Other: [facility] [[id number]]    Travel Screening: Not Applicable     Date of Service:

## 2025-01-21 DIAGNOSIS — I49.3 PVC'S (PREMATURE VENTRICULAR CONTRACTIONS): Primary | ICD-10-CM

## 2025-01-21 RX ORDER — METOPROLOL TARTRATE 50 MG
50 TABLET ORAL 2 TIMES DAILY
Qty: 180 TABLET | Refills: 3 | Status: SHIPPED | OUTPATIENT
Start: 2025-01-21

## 2025-02-26 NOTE — LETTER
"2/22/2019       RE: Kate Isaac  [address] [address]     Dear Colleague,    Thank you for referring your patient, Kate Isaac, to the [facility]. Please see a copy of my visit note below.    [facility]  MEDICAL ONCOLOGY PROGRESS NOTE  Feb 22, 2019    CHIEF COMPLAINT: History of basal cell carcinomas and new diagnosis of a cutaneous leiomyosarcoma    Oncologic history:  1. History of ER+ breast cancer in 1996 s/p lumpectomy and axillary lymph node dissection and radiation therapy s/p adjuvant Tamoxifen and right sided lymphedema.  2. 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 1/24/19 - had first layer of \"slow Mohs\" surgery  4. 1/25/19 - had second layer of \"slow Moh's\" surgery performed for additional margins, pathology returned as leiomyosarcoma (awaiting internal review)    HISTORY OF PRESENT ILLNESS  Kate Isaac is a 83 year old female with a history of ER+ breast cancer in 1996 s/p lumpectomy and axillary lymph node dissection and radiation therapy s/p adjuvant Tamoxifen and right sided lymphedema. She also has history of several basal cell carcinomas, and a new diagnosis of a cutaneous leiomyosarcoma.    Last evaluated on 2/4/19 at which time we were awaiting pathology review. She returns today in routine follow-up with staging CT and labs. Since her last visit, has been doing well. Right leg lesion healing up, feels good. No concerns. Also had a spot on her left shin frozen. Otherwise in usual state of health. No fevers, chills, weight loss. No cough, shortness of breath, abdominal pain, nausea, vomiting, diarrhea. Occasional night sweats on/off menopause, not significantly changed recently.    Performance status is 0.      Results for orders placed or performed in visit on 02/22/19   Comprehensive metabolic panel   Result Value Ref Range    Sodium 136 133 - 144 mmol/L    Potassium 4.0 3.4 - 5.3 " Propranolol too costly per granddaughter, Ruth Ann Cee was previously free  Primidone 12.5mg daily sent to pharmacy  Monitor   mmol/L    Chloride 102 94 - 109 mmol/L    Carbon Dioxide 26 20 - 32 mmol/L    Anion Gap 7 3 - 14 mmol/L    Glucose 84 70 - 99 mg/dL    Urea Nitrogen 13 7 - 30 mg/dL    Creatinine 0.49 (L) 0.52 - 1.04 mg/dL    GFR Estimate >90 >60 mL/min/[1.73_m2]    GFR Estimate If Black >90 >60 mL/min/[1.73_m2]    Calcium 8.2 (L) 8.5 - 10.1 mg/dL    Bilirubin Total 0.6 0.2 - 1.3 mg/dL    Albumin 3.7 3.4 - 5.0 g/dL    Protein Total 7.2 6.8 - 8.8 g/dL    Alkaline Phosphatase 69 40 - 150 U/L    ALT 17 0 - 50 U/L    AST 17 0 - 45 U/L   CBC with platelets differential   Result Value Ref Range    WBC 6.7 4.0 - 11.0 10e9/L    RBC Count 4.42 3.8 - 5.2 10e12/L    Hemoglobin 12.6 11.7 - 15.7 g/dL    Hematocrit 39.3 35.0 - 47.0 %    MCV 89 78 - 100 fl    MCH 28.5 26.5 - 33.0 pg    MCHC 32.1 31.5 - 36.5 g/dL    RDW 12.6 10.0 - 15.0 %    Platelet Count 297 150 - 450 10e9/L    Diff Method Automated Method     % Neutrophils 67.7 %    % Lymphocytes 18.5 %    % Monocytes 8.2 %    % Eosinophils 4.2 %    % Basophils 1.3 %    % Immature Granulocytes 0.1 %    Nucleated RBCs 0 0 /100    Absolute Neutrophil 4.6 1.6 - 8.3 10e9/L    Absolute Lymphocytes 1.2 0.8 - 5.3 10e9/L    Absolute Monocytes 0.6 0.0 - 1.3 10e9/L    Absolute Eosinophils 0.3 0.0 - 0.7 10e9/L    Absolute Basophils 0.1 0.0 - 0.2 10e9/L    Abs Immature Granulocytes 0.0 0 - 0.4 10e9/L    Absolute Nucleated RBC 0.0          REVIEW OF SYSTEMS  A 12-point ROS negative except as in HPI    Current Outpatient Medications   Medication Sig Dispense Refill     amLODIPine (NORVASC) 5 MG tablet TAKE 1 TABLET EVERY DAY 90 tablet 3     ASPIRIN 81 MG OR TABS 1 TABLET DAILY PM       calcium carb 1250 mg, 500 mg [place],/vitamin D 200 units (OSCAL WITH D) 500-200 MG-UNIT per tablet Take 1 tablet by mouth 2 times daily (with meals)       FIBER PO Take 2 capsules by mouth At Bedtime        Flaxseed, Linseed, (FLAX PO)        GLUCOSAMINE CHONDROITIN OR TABS 1 twice daily       losartan (COZAAR) 50 MG tablet Take  1 tablet (50 mg) by mouth daily 90 tablet 1     meloxicam (MOBIC) 15 MG tablet TAKE 1 TABLET EVERY DAY 90 tablet 1     Multiple Vitamins-Minerals (PRESERVISION AREDS 2) CAPS One in AM and one in PM         Allergies   Allergen Reactions     Ceftriaxone Swelling     Sulfa Drugs Rash     Lisinopril Fatigue     Immunization History   Administered Date(s) Administered     HEPA 06/29/2009, 12/30/2009     HepB 06/29/2009, 08/10/2009, 12/30/2009     Influenza (High Dose) 3 valent vaccine 09/28/2012, 10/22/2015, 10/07/2016     Influenza (IIV3) PF 10/15/2013, 10/29/2014     Pneumo Conj 13-V (2010&after) 01/05/2015     Pneumococcal 23 valent 01/01/2005     TD (ADULT, 7+) 03/03/1993, 06/07/2004, 07/21/2014     Zoster vaccine, live 06/13/2007       Past Medical History:   Diagnosis Date     Arthritis 02/01/2014    Per pt had it since FEB     Basal cell cancer     sees dermatologist     Breast cancer (H) 1996    invasive ductal     HTN      Osteopenia      Recurrent UTI        Past Surgical History:   Procedure Laterality Date     BIOPSY  1996 2004,2010    basal cell cancers     BUNIONECTOMY RT/LT       [facility]FF SURGICAL PATHOLOGY      ganglion cyst removal     COLONOSCOPY       GENITOURINARY SURGERY       HC EXCISION BREAST LESION, OPEN >=1  1996    right breast     SLING TRANSVAGINAL  12/9/2013    Procedure: SLING TRANSVAGINAL;  Cysto with TVT;  Surgeon: Denia Shultz MD;  Location: [facility] OR     SURGICAL HISTORY OF -   1959    right thigh tumor removal/benign       SOCIAL HISTORY  History   Smoking Status     Former Smoker     Packs/day: 1.00     Years: 10.00     Types: Cigarettes     Start date: 1/1/1956     Quit date: 1/1/1985   Smokeless Tobacco     Never Used     Comment: I stopped smoking several times from 1959 to 1985    Social History    Substance and Sexual Activity      Alcohol use: Yes        Alcohol/week: 1.5 - 2.0 oz        Comment: Red wine     History   Drug Use No       FAMILY HISTORY  Family History   Problem  "Relation Age of Onset     Heart Disease Mother         cad at age 70s     Cancer Mother         KIDNEY     Diabetes Mother      Coronary Artery Disease Mother      Hypertension Mother      Heart Disease Father         chf     Neurologic Disorder Father         PARKINSONS     Alzheimer Disease Father      Heart Disease Maternal Grandmother         chf     Coronary Artery Disease Maternal Grandmother      Cancer Maternal Grandfather         ?     Heart Disease Paternal Grandmother      Coronary Artery Disease Paternal Grandmother      Heart Disease Paternal Grandfather      Neurologic Disorder Paternal Grandfather         PARKINSONS     Blood Disease Brother         LEUKEMIA     Breast Cancer Other      Diabetes Other         Mother's sister's daughter     Cancer Daughter         CERVICAL     Cancer Other         THYROID     Cerebrovascular Disease Other      Breast Cancer Daughter        PHYSICAL EXAMINATION  /88   Pulse 82   Temp 98.1  F (36.7  C) (Tympanic)   Ht 1.613 m (5' 3.5\")   Wt 62.1 kg (136 lb 12.8 oz)   BMI 23.85 kg/m     Wt Readings from Last 2 Encounters:   02/22/19 62.1 kg (136 lb 12.8 oz)   02/04/19 62.1 kg (137 lb)     Physical Exam   Constitutional: She is oriented to person, place, and time. She appears well-developed and well-nourished.   HENT:   Mouth/Throat: Oropharynx is clear and moist.   Eyes: Conjunctivae and EOM are normal. Pupils are equal, round, and reactive to light.   Neck: Normal range of motion.   Cardiovascular: Regular rhythm.   No murmur heard.  Pulmonary/Chest: Effort normal and breath sounds normal. No respiratory distress. She has no wheezes.   Abdominal: Soft. Bowel sounds are normal. She exhibits no distension.   Musculoskeletal: Normal range of motion. She exhibits no edema or tenderness.   Lymphadenopathy:     She has no cervical adenopathy.   Neurological: She is alert and oriented to person, place, and time. No cranial nerve deficit.   Skin: Skin is warm and dry. " No pallor.   Right lower leg with healing incision - clean, dry, and intact with small area of dehiscence at inferior aspect of incision. No evidence of nodular recurrence.   Psychiatric: She has a normal mood and affect. Her behavior is normal.   Nursing note and vitals reviewed.    ASSESSMENT AND PLAN  #1 Dermal leiomyosarcoma, right lower leg  #2 History of basal cell carcinomas  At this time, we are still waiting internal pathology review of her recent Mohs procedure. CT chest/abdomen/pelvis formal read from today still pending but upon our review, there is possibly a cystic pancreatic lesion that may need future surveillance imaging but no obvious metastatic lesions. 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  We offered reassurance today that this area will be unlikely to cause problems. If recurrence occurs, it is usually local and so we advised that she continue regular skin checks.    Return to clinic as needed, we will call when we have pathology results and if any updates with CT imaging from today.    Patient discussed and evaluated with Dr. Townsend.    Betty Chadwick MD  Medicine-Dermatology PGY-5  [phone]  ---  I evaluated the patient and reviewed the plan of care with the patient and the Resident. I agree with the assessment and plan documented in the clinical note.    The pathology was independently reviewed and Dr. Abel agreed with the diagnosis. We reviewed that given the small size of her tumor we would not recommend adjuvant therapy, and would recommend she continue to follow with her dermatologist. No need to continue with surveillance in oncology. She was very appreciative of the follow-up phone call.    Angelica Townsend M.D.   of Medicine  Hematology, Oncology and Transplantation  [facility]

## 2025-04-06 NOTE — PROGRESS NOTES
HEART CARE FOLLOW UP NOTE      [facility]  [phone]      Assessment/Recommendations   Assessment: 89 year old female with dizziness, VT     Plan:  Dizziness  Unclear etiology, no symptoms during Zio despite noted arrhythmia, episode sounds more like vertigo       -Follow-up with PCP      -As below for VT, likely unrelated to dizziness      VT  Noted on Zio 2017, again on 2 most recent Zios, no symptoms.  Normal echocardiogram and nuc, discussed with EP who advised medical Rx with beta blockers.         -Continue Lopressor 50mg BID, see below       -Return to clinic 1 year       LE edema   Likely venous insufficiency, echocardiogram normal       -Compression, elevation, exercise, weight loss, avoid salt      -Will try stopping Norvasc, see below       HTN  A bit high        -Continue Metoprolol 50mg BID, Losartan 100mg       -Stop Norvasc 2.5mg for edema      -Monitor BP over next 2 weeks, if still high can increase Metoprolol to 100mg      Lipids  LDL = 97, not on medications given age       -Lifestyle     The longitudinal plan of care for the diagnosis(es)/condition(s) as documented were addressed during this visit. Due to the added complexity in care, I will continue to support Kate in the subsequent management and with ongoing continuity of care.          History of Present Illness/Subjective    Indication for visit:  Kate Isaac returns for follow up of dizziness, VT and was last seen on 9/9/2024 by myself.      HPI: Kate Isaac is a 89 year old female with a history of HTN, NSVT on Zio in 2017 (saw cardiology and had negative rest echocardiogram, no further testing) who I saw last fall for evaluation of NSVT.  Seen in the ER 8/13/2024 for dizziness, BP quite high, negative evaluation, advised Zio and that returned showing VT without associated symptoms.  She described vertigo.  I advised echocardiogram and stress nuc both negative.  We added Metoprolol and repeat Zio with  on-going VT, EP advised increase Metoprolol dose.     She returns for follow up and reports no palpitations, feels well no chest pain, dyspnea, orthopnea or PND.    I reviewed notes from PCP prior to this visit.         Physical Examination  Past Cardiac History   Vitals: BP (!) 144/74 (BP Location: Left arm, Patient Position: Sitting, Cuff Size: Adult Regular)   Pulse 71   Resp 16   Wt 59 kg (130 lb)   SpO2 98%   BMI 23.40 kg/m    BMI= Body mass index is 23.4 kg/m .  Wt Readings from Last 3 Encounters:   04/09/25 59 kg (130 lb)   11/06/24 59.1 kg (130 lb 6.4 oz)   09/09/24 58.3 kg (128 lb 9.6 oz)       General Appearance:   no distress, normal body habitus   ENT/Mouth: membranes moist, no oral lesions or bleeding gums.      EYES:  no scleral icterus, normal conjunctivae   Neck: no carotid bruits or thyromegaly   Chest/Lungs:   lungs are clear to auscultation, no rales or wheezing,  sternal scar, equal chest wall expansion    Cardiovascular:   Regular. Normal first and second heart sounds with no murmurs, rubs, or gallops; the carotid, radial and posterior tibial pulses are intact, Jugular venous pressure normal , no edema bilaterally    Abdomen:  no organomegaly, masses, bruits, or tenderness; bowel sounds are present   Extremities: no cyanosis or clubbing   Skin: no xanthelasma, warm.    Neurologic: normal  bilateral, no tremors           NSVT    Zio: 11/8/2024  Zio monitoring from 11/1/2024 to 11/4/2024 (duration 3d 2h).  Predominant rhythm was sinus rhythm, 55 to 126bpm, average 78bpm.  26 episodes of nonsustained supraventricular tachycardia - longest 9.7s avg 129, fastest 14 beats at 182bpm.  10 episodes of nonsustained ventricular tachycardia - longest and fastest 10 beats maximum 222bpm.  No sustained tachyarrhythmias.  No atrial fibrillation.  There were no pauses of greater than 3 seconds.  Occasional supraventricular ectopic beats (isolated 4.1%).  Occasional premature ventricular contractions  (isolated 1.2%).  Symptom triggers and diary entries (1) correlated to sinus rhythm 81bpm with PACs and PVCs.    Most Recent Echocardiogram: 9/27/2024  The visual ejection fraction is 60-65% without wall motion abnormality.  The right ventricle is normal in size and function.  No significant valve disease.  The right ventricular systolic pressure is approximated at 37 mmHg.  Compared to the prior study dated 9/22/2017, there have been no changes.    Most Recent Stress Test: 9/27/2024    There is no prior study for comparison.    Pharmacological regadenoson stress ECG is negative for inducible myocardial ischemia.    Pharmacological regadenoson nuclear stress test is negative for inducible myocardial ischemia or infarction.    Normal left ventricular size, wall motion and systolic function.  The calculated left ventricular ejection fraction is 70%.    The patient is at a low risk of future cardiac ischemic events.     Most Recent Angiogram: None     ECG (reviewed by myself): 8/13/2024 NSR 79 bpm, PACs           Medical History  Family History Social History   Past Medical History:   Diagnosis Date    Arthritis 02/01/2014    Per pt had it since FEB    Basal cell cancer     sees dermatologist    Breast cancer (H) 1996    invasive ductal    HTN     Osteopenia     Recurrent UTI      Family History   Problem Relation Age of Onset    Heart Disease Mother         cad at age 70s    Cancer Mother         KIDNEY    Diabetes Mother     Coronary Artery Disease Mother     Hypertension Mother     Heart Disease Father         chf    Neurologic Disorder Father         PARKINSONS    Alzheimer Disease Father     Heart Disease Maternal Grandmother         chf    Coronary Artery Disease Maternal Grandmother     Cancer Maternal Grandfather         ?    Heart Disease Paternal Grandmother     Coronary Artery Disease Paternal Grandmother     Heart Disease Paternal Grandfather     Neurologic Disorder Paternal Grandfather         PARKINSONS     Blood Disease Brother         LEUKEMIA    Breast Cancer Other     Diabetes Other         Mother's sister's daughter    Cancer Daughter         CERVICAL    Cancer Other         THYROID    Cerebrovascular Disease Other     Breast Cancer Daughter         Social History     Socioeconomic History    Marital status:      Spouse name: Not on file    Number of children: Not on file    Years of education: Not on file    Highest education level: Not on file   Occupational History    Not on file   Tobacco Use    Smoking status: Former     Current packs/day: 0.00     Average packs/day: 1 pack/day for 32.0 years (32.0 ttl pk-yrs)     Types: Cigarettes     Start date: 1956     Quit date: 1985     Years since quittin.2    Smokeless tobacco: Never    Tobacco comments:     I stopped smoking several times from  to    Vaping Use    Vaping status: Never Used   Substance and Sexual Activity    Alcohol use: Yes     Alcohol/week: 2.5 - 3.3 standard drinks of alcohol     Comment: I enjoy a glass of red wine in the evening.    Drug use: Never    Sexual activity: Not Currently     Partners: Male     Birth control/protection: Post-menopausal, Female Surgical     Comment: Tubal in  & now I am post-menopausal   Other Topics Concern     Service No    Blood Transfusions No    Caffeine Concern No    Occupational Exposure No    Hobby Hazards No    Sleep Concern No    Stress Concern No    Weight Concern No    Special Diet No    Back Care No    Exercise No    Bike Helmet No    Seat Belt No    Self-Exams No    Parent/sibling w/ CABG, MI or angioplasty before 65F 55M? No   Social History Narrative    Not on file     Social Drivers of Health     Financial Resource Strain: Low Risk  (2024)    Financial Resource Strain     Within the past 12 months, have you or your family members you live with been unable to get utilities (heat, electricity) when it was really needed?: No   Food Insecurity: Low Risk   (8/21/2024)    Food Insecurity     Within the past 12 months, did you worry that your food would run out before you got money to buy more?: No     Within the past 12 months, did the food you bought just not last and you didn t have money to get more?: No   Transportation Needs: Low Risk  (8/21/2024)    Transportation Needs     Within the past 12 months, has lack of transportation kept you from medical appointments, getting your medicines, non-medical meetings or appointments, work, or from getting things that you need?: No   Physical Activity: Insufficiently Active (8/21/2024)    Exercise Vital Sign     Days of Exercise per Week: 2 days     Minutes of Exercise per Session: 30 min   Stress: Stress Concern Present (8/21/2024)    [employer] [place] [employer] - Occupational Stress Questionnaire     Feeling of Stress : To some extent   Social Connections: Unknown (8/21/2024)    Social Connection and Isolation Panel [NHANES]     Frequency of Communication with Friends and Family: Not on file     Frequency of Social Gatherings with Friends and Family: Twice a week     Attends Amish Services: Not on file     Active Member of Clubs or Organizations: Not on file     Attends Club or Organization Meetings: Not on file     Marital Status: Not on file   Interpersonal Safety: Low Risk  (3/25/2024)    Interpersonal Safety     Do you feel physically and emotionally safe where you currently live?: Yes     Within the past 12 months, have you been hit, slapped, kicked or otherwise physically hurt by someone?: No     Within the past 12 months, have you been humiliated or emotionally abused in other ways by your partner or ex-partner?: No   Housing Stability: Low Risk  (8/21/2024)    Housing Stability     Do you have housing? : Yes     Are you worried about losing your housing?: No           Medications  Allergies   Current Outpatient Medications   Medication Sig Dispense Refill    amLODIPine (NORVASC) 2.5 MG tablet TAKE  "1 TABLET BY MOUTH DAILY 90 tablet 2    calcium carb 1250 mg, 500 mg [place],/vitamin D 200 units (OSCAL WITH D) 500-200 MG-UNIT per tablet Take 1 tablet by mouth 2 times daily (with meals)      FIBER PO Take 2 capsules by mouth At Bedtime       Flaxseed, Linseed, (FLAX PO)       GLUCOSAMINE CHONDROITIN OR TABS 1 twice daily      losartan (COZAAR) 100 MG tablet Take 1 tablet (100 mg) by mouth daily. 90 tablet 3    metoprolol tartrate (LOPRESSOR) 50 MG tablet Take 1 tablet (50 mg) by mouth 2 times daily. 180 tablet 1    Multiple Vitamins-Minerals (PRESERVISION AREDS 2) CAPS One in AM and one in PM      TART BOSE PO          Allergies   Allergen Reactions    Ceftriaxone Swelling    Sulfa Antibiotics Rash    Lisinopril Fatigue          Lab Results    Chemistry/lipid CBC Cardiac Enzymes/BNP/TSH/INR   Recent Labs   Lab Test 08/15/24  1011   CHOL 187   HDL 78   LDL 97   TRIG 60     Recent Labs   Lab Test 08/15/24  1011 08/21/23  1011 07/06/22  1402   LDL 97 121* 108*     Recent Labs   Lab Test 11/06/24  1440      POTASSIUM 3.9   CHLORIDE 100   CO2 25   GLC 87   BUN 13.8   CR 0.48*   GFRESTIMATED >90   SASHA 9.2     Recent Labs   Lab Test 11/06/24  1440 08/21/23  1011 07/06/22  1402   CR 0.48* 0.46* 0.51*     No results for input(s): \"A1C\" in the last 24665 hours.       Recent Labs   Lab Test 11/06/24  1440   WBC 6.8   HGB 13.3   HCT 41.4   MCV 89        Recent Labs   Lab Test 11/06/24  1440 08/21/23  1011 02/22/21  1107   HGB 13.3 13.5 12.2    No results for input(s): \"TROPONINI\" in the last 66508 hours.  No results for input(s): \"BNP\", \"NTBNPI\", \"NTBNP\" in the last 23843 hours.  Recent Labs   Lab Test 08/15/24  1011   TSH 2.22     No results for input(s): \"INR\" in the last 12885 hours.     Mariely Gruber MD  Noninvasive Cardiologist   [facility]                                    "

## 2025-04-09 ENCOUNTER — OFFICE VISIT (OUTPATIENT)
Dept: CARDIOLOGY | Facility: CLINIC | Age: 89
End: 2025-04-09
Payer: COMMERCIAL

## 2025-04-09 VITALS
RESPIRATION RATE: 16 BRPM | WEIGHT: 130 LBS | HEART RATE: 71 BPM | BODY MASS INDEX: 23.4 KG/M2 | DIASTOLIC BLOOD PRESSURE: 74 MMHG | OXYGEN SATURATION: 98 % | SYSTOLIC BLOOD PRESSURE: 144 MMHG

## 2025-04-09 DIAGNOSIS — I10 PRIMARY HYPERTENSION: ICD-10-CM

## 2025-04-09 DIAGNOSIS — I47.20 VENTRICULAR TACHYCARDIA, PAROXYSMAL (H): Primary | ICD-10-CM

## 2025-04-09 DIAGNOSIS — I87.2 VENOUS (PERIPHERAL) INSUFFICIENCY: ICD-10-CM

## 2025-04-09 DIAGNOSIS — E78.5 HYPERLIPIDEMIA LDL GOAL <100: ICD-10-CM

## 2025-04-09 RX ORDER — METOPROLOL TARTRATE 50 MG
50 TABLET ORAL 2 TIMES DAILY
Qty: 180 TABLET | Refills: 2 | Status: SHIPPED | OUTPATIENT
Start: 2025-04-09

## 2025-04-09 NOTE — LETTER
4/9/2025    Lani Valencia [place]  [address] [facility] [address]    RE: Kate Isaac       Dear Colleague,     I had the pleasure of seeing Kate Isaac in the [facility].    HEART CARE FOLLOW UP NOTE      [facility]  [phone]      Assessment/Recommendations   Assessment: 89 year old female with dizziness, VT     Plan:  Dizziness  Unclear etiology, no symptoms during Zio despite noted arrhythmia, episode sounds more like vertigo       -Follow-up with PCP      -As below for VT, likely unrelated to dizziness      VT  Noted on Zio 2017, again on 2 most recent Zios, no symptoms.  Normal echocardiogram and nuc, discussed with EP who advised medical Rx with beta blockers.         -Continue Lopressor 50mg BID, see below       -Return to clinic 1 year       LE edema   Likely venous insufficiency, echocardiogram normal       -Compression, elevation, exercise, weight loss, avoid salt      -Will try stopping Norvasc, see below       HTN  A bit high        -Continue Metoprolol 50mg BID, Losartan 100mg       -Stop Norvasc 2.5mg for edema      -Monitor BP over next 2 weeks, if still high can increase Metoprolol to 100mg      Lipids  LDL = 97, not on medications given age       -Lifestyle     The longitudinal plan of care for the diagnosis(es)/condition(s) as documented were addressed during this visit. Due to the added complexity in care, I will continue to support Kate in the subsequent management and with ongoing continuity of care.          History of Present Illness/Subjective    Indication for visit:  Kaet Isaac returns for follow up of dizziness, VT and was last seen on 9/9/2024 by myself.      HPI: Kate Isaac is a 89 year old female with a history of HTN, NSVT on Zio in 2017 (saw cardiology and had negative rest echocardiogram, no further testing) who I saw last fall for evaluation of NSVT.  Seen in the ER 8/13/2024 for dizziness, BP quite high,  negative evaluation, advised Zio and that returned showing VT without associated symptoms.  She described vertigo.  I advised echocardiogram and stress nuc both negative.  We added Metoprolol and repeat Zio with on-going VT, EP advised increase Metoprolol dose.     She returns for follow up and reports no palpitations, feels well no chest pain, dyspnea, orthopnea or PND.    I reviewed notes from PCP prior to this visit.         Physical Examination  Past Cardiac History   Vitals: BP (!) 144/74 (BP Location: Left arm, Patient Position: Sitting, Cuff Size: Adult Regular)   Pulse 71   Resp 16   Wt 59 kg (130 lb)   SpO2 98%   BMI 23.40 kg/m    BMI= Body mass index is 23.4 kg/m .  Wt Readings from Last 3 Encounters:   04/09/25 59 kg (130 lb)   11/06/24 59.1 kg (130 lb 6.4 oz)   09/09/24 58.3 kg (128 lb 9.6 oz)       General Appearance:   no distress, normal body habitus   ENT/Mouth: membranes moist, no oral lesions or bleeding gums.      EYES:  no scleral icterus, normal conjunctivae   Neck: no carotid bruits or thyromegaly   Chest/Lungs:   lungs are clear to auscultation, no rales or wheezing,  sternal scar, equal chest wall expansion    Cardiovascular:   Regular. Normal first and second heart sounds with no murmurs, rubs, or gallops; the carotid, radial and posterior tibial pulses are intact, Jugular venous pressure normal , no edema bilaterally    Abdomen:  no organomegaly, masses, bruits, or tenderness; bowel sounds are present   Extremities: no cyanosis or clubbing   Skin: no xanthelasma, warm.    Neurologic: normal  bilateral, no tremors           NSVT    Zio: 11/8/2024  Zio monitoring from 11/1/2024 to 11/4/2024 (duration 3d 2h).  Predominant rhythm was sinus rhythm, 55 to 126bpm, average 78bpm.  26 episodes of nonsustained supraventricular tachycardia - longest 9.7s avg 129, fastest 14 beats at 182bpm.  10 episodes of nonsustained ventricular tachycardia - longest and fastest 10 beats maximum 222bpm.  No  sustained tachyarrhythmias.  No atrial fibrillation.  There were no pauses of greater than 3 seconds.  Occasional supraventricular ectopic beats (isolated 4.1%).  Occasional premature ventricular contractions (isolated 1.2%).  Symptom triggers and diary entries (1) correlated to sinus rhythm 81bpm with PACs and PVCs.    Most Recent Echocardiogram: 9/27/2024  The visual ejection fraction is 60-65% without wall motion abnormality.  The right ventricle is normal in size and function.  No significant valve disease.  The right ventricular systolic pressure is approximated at 37 mmHg.  Compared to the prior study dated 9/22/2017, there have been no changes.    Most Recent Stress Test: 9/27/2024     There is no prior study for comparison.     Pharmacological regadenoson stress ECG is negative for inducible myocardial ischemia.     Pharmacological regadenoson nuclear stress test is negative for inducible myocardial ischemia or infarction.     Normal left ventricular size, wall motion and systolic function.  The calculated left ventricular ejection fraction is 70%.     The patient is at a low risk of future cardiac ischemic events.     Most Recent Angiogram: None     ECG (reviewed by myself): 8/13/2024 NSR 79 bpm, PACs           Medical History  Family History Social History   Past Medical History:   Diagnosis Date     Arthritis 02/01/2014    Per pt had it since FEB     Basal cell cancer     sees dermatologist     Breast cancer (H) 1996    invasive ductal     HTN      Osteopenia      Recurrent UTI      Family History   Problem Relation Age of Onset     Heart Disease Mother         cad at age 70s     Cancer Mother         KIDNEY     Diabetes Mother      Coronary Artery Disease Mother      Hypertension Mother      Heart Disease Father         chf     Neurologic Disorder Father         PARKINSONS     Alzheimer Disease Father      Heart Disease Maternal Grandmother         chf     Coronary Artery Disease Maternal Grandmother       Cancer Maternal Grandfather         ?     Heart Disease Paternal Grandmother      Coronary Artery Disease Paternal Grandmother      Heart Disease Paternal Grandfather      Neurologic Disorder Paternal Grandfather         PARKINSONS     Blood Disease Brother         LEUKEMIA     Breast Cancer Other      Diabetes Other         Mother's sister's daughter     Cancer Daughter         CERVICAL     Cancer Other         THYROID     Cerebrovascular Disease Other      Breast Cancer Daughter         Social History     Socioeconomic History     Marital status:      Spouse name: Not on file     Number of children: Not on file     Years of education: Not on file     Highest education level: Not on file   Occupational History     Not on file   Tobacco Use     Smoking status: Former     Current packs/day: 0.00     Average packs/day: 1 pack/day for 32.0 years (32.0 ttl pk-yrs)     Types: Cigarettes     Start date: 1956     Quit date: 1985     Years since quittin.2     Smokeless tobacco: Never     Tobacco comments:     I stopped smoking several times from  to    Vaping Use     Vaping status: Never Used   Substance and Sexual Activity     Alcohol use: Yes     Alcohol/week: 2.5 - 3.3 standard drinks of alcohol     Comment: I enjoy a glass of red wine in the evening.     Drug use: Never     Sexual activity: Not Currently     Partners: Male     Birth control/protection: Post-menopausal, Female Surgical     Comment: Tubal in  & now I am post-menopausal   Other Topics Concern      Service No     Blood Transfusions No     Caffeine Concern No     Occupational Exposure No     Hobby Hazards No     Sleep Concern No     Stress Concern No     Weight Concern No     Special Diet No     Back Care No     Exercise No     Bike Helmet No     Seat Belt No     Self-Exams No     Parent/sibling w/ CABG, MI or angioplasty before 65F 55M? No   Social History Narrative     Not on file     Social Drivers of Health      Financial Resource Strain: Low Risk  (8/21/2024)    Financial Resource Strain      Within the past 12 months, have you or your family members you live with been unable to get utilities (heat, electricity) when it was really needed?: No   Food Insecurity: Low Risk  (8/21/2024)    Food Insecurity      Within the past 12 months, did you worry that your food would run out before you got money to buy more?: No      Within the past 12 months, did the food you bought just not last and you didn t have money to get more?: No   Transportation Needs: Low Risk  (8/21/2024)    Transportation Needs      Within the past 12 months, has lack of transportation kept you from medical appointments, getting your medicines, non-medical meetings or appointments, work, or from getting things that you need?: No   Physical Activity: Insufficiently Active (8/21/2024)    Exercise Vital Sign      Days of Exercise per Week: 2 days      Minutes of Exercise per Session: 30 min   Stress: Stress Concern Present (8/21/2024)    [employer] [place] [employer] - Occupational Stress Questionnaire      Feeling of Stress : To some extent   Social Connections: Unknown (8/21/2024)    Social Connection and Isolation Panel [NHANES]      Frequency of Communication with Friends and Family: Not on file      Frequency of Social Gatherings with Friends and Family: Twice a week      Attends Sabianist Services: Not on file      Active Member of Clubs or Organizations: Not on file      Attends Club or Organization Meetings: Not on file      Marital Status: Not on file   Interpersonal Safety: Low Risk  (3/25/2024)    Interpersonal Safety      Do you feel physically and emotionally safe where you currently live?: Yes      Within the past 12 months, have you been hit, slapped, kicked or otherwise physically hurt by someone?: No      Within the past 12 months, have you been humiliated or emotionally abused in other ways by your partner or ex-partner?: No  "  Housing Stability: Low Risk  (8/21/2024)    Housing Stability      Do you have housing? : Yes      Are you worried about losing your housing?: No           Medications  Allergies   Current Outpatient Medications   Medication Sig Dispense Refill     amLODIPine (NORVASC) 2.5 MG tablet TAKE 1 TABLET BY MOUTH DAILY 90 tablet 2     calcium carb 1250 mg, 500 mg [place],/vitamin D 200 units (OSCAL WITH D) 500-200 MG-UNIT per tablet Take 1 tablet by mouth 2 times daily (with meals)       FIBER PO Take 2 capsules by mouth At Bedtime        Flaxseed, Linseed, (FLAX PO)        GLUCOSAMINE CHONDROITIN OR TABS 1 twice daily       losartan (COZAAR) 100 MG tablet Take 1 tablet (100 mg) by mouth daily. 90 tablet 3     metoprolol tartrate (LOPRESSOR) 50 MG tablet Take 1 tablet (50 mg) by mouth 2 times daily. 180 tablet 1     Multiple Vitamins-Minerals (PRESERVISION AREDS 2) CAPS One in AM and one in PM       TART BOSE PO          Allergies   Allergen Reactions     Ceftriaxone Swelling     Sulfa Antibiotics Rash     Lisinopril Fatigue          Lab Results    Chemistry/lipid CBC Cardiac Enzymes/BNP/TSH/INR   Recent Labs   Lab Test 08/15/24  1011   CHOL 187   HDL 78   LDL 97   TRIG 60     Recent Labs   Lab Test 08/15/24  1011 08/21/23  1011 07/06/22  1402   LDL 97 121* 108*     Recent Labs   Lab Test 11/06/24  1440      POTASSIUM 3.9   CHLORIDE 100   CO2 25   GLC 87   BUN 13.8   CR 0.48*   GFRESTIMATED >90   SASHA 9.2     Recent Labs   Lab Test 11/06/24  1440 08/21/23  1011 07/06/22  1402   CR 0.48* 0.46* 0.51*     No results for input(s): \"A1C\" in the last 68075 hours.       Recent Labs   Lab Test 11/06/24  1440   WBC 6.8   HGB 13.3   HCT 41.4   MCV 89        Recent Labs   Lab Test 11/06/24  1440 08/21/23  1011 02/22/21  1107   HGB 13.3 13.5 12.2    No results for input(s): \"TROPONINI\" in the last 28907 hours.  No results for input(s): \"BNP\", \"NTBNPI\", \"NTBNP\" in the last 52057 hours.  Recent Labs   Lab Test 08/15/24  1011 " "  TSH 2.22     No results for input(s): \"INR\" in the last 60236 hours.     Mariely Gruber MD  Noninvasive Cardiologist   [facility]                                        Thank you for allowing me to participate in the care of your patient.      Sincerely,     Mariely Gruber MD     [facility]  cc:   Mariely Gruber MD  [address] [place] [address]      "

## 2025-04-09 NOTE — TELEPHONE ENCOUNTER
brought writer Rx renewal forms for Metoprolol Tart that patient left behind after her visit with Dr. Gruber today.    Per Dr. Gruber's visit note:  HTN  A bit high        -Continue Metoprolol 50mg BID, Losartan 100mg       -Stop Norvasc 2.5mg for edema      -Monitor BP over next 2 weeks, if still high can increase Metoprolol to 100mg     Noted Metoprolol Tart Rx was not refilled at visit so refills sent to [facility] mail service as indicated on forms.  mg

## 2025-04-09 NOTE — PATIENT INSTRUCTIONS
Your blood pressure looks good to me, however the Amlodipine be may still be contributing the the leg swelling.    Lets stop the Amlodipine and monitor blood pressure over the next 2 weeks.  Let Dr Gruber know how it looks and if running high we can increase the Metoprolol dose further

## 2025-04-23 ENCOUNTER — MYC MEDICAL ADVICE (OUTPATIENT)
Dept: CARDIOLOGY | Facility: CLINIC | Age: 89
End: 2025-04-23
Payer: COMMERCIAL

## 2025-04-23 DIAGNOSIS — I10 PRIMARY HYPERTENSION: Primary | ICD-10-CM

## 2025-04-24 RX ORDER — METOPROLOL TARTRATE 50 MG
75 TABLET ORAL 2 TIMES DAILY
Qty: 270 TABLET | Refills: 3 | Status: SHIPPED | OUTPATIENT
Start: 2025-04-24

## 2025-05-29 ENCOUNTER — OFFICE VISIT (OUTPATIENT)
Dept: FAMILY MEDICINE | Facility: CLINIC | Age: 89
End: 2025-05-29
Payer: COMMERCIAL

## 2025-05-29 VITALS
WEIGHT: 136 LBS | BODY MASS INDEX: 24.1 KG/M2 | RESPIRATION RATE: 16 BRPM | TEMPERATURE: 97.6 F | HEART RATE: 58 BPM | HEIGHT: 63 IN | OXYGEN SATURATION: 98 % | DIASTOLIC BLOOD PRESSURE: 66 MMHG | SYSTOLIC BLOOD PRESSURE: 145 MMHG

## 2025-05-29 DIAGNOSIS — I10 ESSENTIAL HYPERTENSION WITH GOAL BLOOD PRESSURE LESS THAN 140/90: Primary | ICD-10-CM

## 2025-05-29 DIAGNOSIS — C50.919 MALIGNANT NEOPLASM OF FEMALE BREAST, UNSPECIFIED ESTROGEN RECEPTOR STATUS, UNSPECIFIED LATERALITY, UNSPECIFIED SITE OF BREAST (H): ICD-10-CM

## 2025-05-29 ASSESSMENT — PAIN SCALES - GENERAL: PAINLEVEL_OUTOF10: NO PAIN (0)

## 2025-05-29 NOTE — PROGRESS NOTES
"  Assessment & Plan     (I10) Essential hypertension with goal blood pressure less than 140/90  (primary encounter diagnosis)  Comment: Her blood pressure was elevated this morning.  This is likely a stress reaction.  She has problems with whitecoat hypertension.  Her blood pressure is improved.  She has been checking her blood pressures regularly at home.  Her blood pressures have been well-controlled for the most part.  She is on Cozaar and metoprolol.  Plan: We discussed hypertension.  We discussed anxiety responses to hypertension.  I recommended that we try to keep her blood pressure under 150 and 95.  I think she primarily has isolated systolic hypertension.  I do not think we should drive her blood pressure down into the 120 and 130 range.  I think at that point in time she is likely to have problems with lightheaded dizziness and possible syncope.  Will have her continue to monitor her pressures at home    (C50.919) Malignant neoplasm of female breast, unspecified estrogen receptor status, unspecified laterality, unspecified site of breast (H)  Comment: Noted   Plan:                  Allegra Love is a 89 year old, presenting for the following health issues:  Hypertension        5/29/2025     9:42 AM   Additional Questions   Roomed by Judie ROLON   Accompanied by Self     History of Present Illness       Reason for visit:  High blood pressue   She is taking medications regularly.    89-year-old female presents with a history of hypertension.  She is on losartan and metoprolol.  She noticed that her blood pressure was elevated at 164/64 this morning.  After waiting her blood pressures improved to 145/66..  She has elevated blood pressure here in the clinic.  She has a history of whitecoat hypertension.          Objective    BP (!) 145/66  Pulse 62   Temp 97.6  F (36.4  C) (Tympanic)   Resp 16   Ht 1.588 m (5' 2.5\")   Wt 61.7 kg (136 lb)   SpO2 98%   BMI 24.48 kg/m    Body mass index is 24.48 " kg/m .  Physical Exam   GENERAL: alert and no distress  NEURO: Normal strength and tone, mentation intact and speech normal  PSYCH: mentation appears normal, affect normal/bright            Signed Electronically by: Bartolome Martinez MD

## 2025-06-16 ENCOUNTER — MYC MEDICAL ADVICE (OUTPATIENT)
Dept: CARDIOLOGY | Facility: CLINIC | Age: 89
End: 2025-06-16
Payer: COMMERCIAL

## 2025-06-16 DIAGNOSIS — I10 PRIMARY HYPERTENSION: Primary | ICD-10-CM

## 2025-06-16 RX ORDER — METOPROLOL TARTRATE 100 MG/1
100 TABLET ORAL 2 TIMES DAILY
Qty: 180 TABLET | Refills: 3 | Status: SHIPPED | OUTPATIENT
Start: 2025-06-16

## 2025-08-12 DIAGNOSIS — I10 ESSENTIAL HYPERTENSION WITH GOAL BLOOD PRESSURE LESS THAN 140/90: ICD-10-CM

## 2025-08-12 RX ORDER — LOSARTAN POTASSIUM 100 MG/1
100 TABLET ORAL DAILY
Qty: 90 TABLET | Refills: 2 | Status: SHIPPED | OUTPATIENT
Start: 2025-08-12

## 2025-08-13 ENCOUNTER — TRANSFERRED RECORDS (OUTPATIENT)
Dept: HEALTH INFORMATION MANAGEMENT | Facility: CLINIC | Age: 89
End: 2025-08-13
Payer: COMMERCIAL

## 2025-08-22 ENCOUNTER — TELEPHONE (OUTPATIENT)
Dept: FAMILY MEDICINE | Facility: CLINIC | Age: 89
End: 2025-08-22
Payer: COMMERCIAL

## 2025-08-22 DIAGNOSIS — I89.0 LYMPHEDEMA OF RIGHT ARM: Primary | ICD-10-CM

## 2025-08-22 DIAGNOSIS — C50.919 MALIGNANT NEOPLASM OF FEMALE BREAST, UNSPECIFIED ESTROGEN RECEPTOR STATUS, UNSPECIFIED LATERALITY, UNSPECIFIED SITE OF BREAST (H): ICD-10-CM

## 2025-08-25 ENCOUNTER — MYC MEDICAL ADVICE (OUTPATIENT)
Dept: CARDIOLOGY | Facility: CLINIC | Age: 89
End: 2025-08-25
Payer: COMMERCIAL

## 2025-09-02 ENCOUNTER — TELEPHONE (OUTPATIENT)
Dept: CARDIOLOGY | Facility: CLINIC | Age: 89
End: 2025-09-02
Payer: COMMERCIAL